# Patient Record
Sex: FEMALE | Race: WHITE | NOT HISPANIC OR LATINO | Employment: OTHER | URBAN - METROPOLITAN AREA
[De-identification: names, ages, dates, MRNs, and addresses within clinical notes are randomized per-mention and may not be internally consistent; named-entity substitution may affect disease eponyms.]

---

## 2017-01-23 DIAGNOSIS — L50.9 HIVES: Primary | ICD-10-CM

## 2017-01-23 RX ORDER — CETIRIZINE HYDROCHLORIDE 10 MG/1
10 TABLET ORAL
Qty: 30 TABLET | Refills: 1 | Status: CANCELLED | OUTPATIENT
Start: 2017-01-23

## 2017-01-23 NOTE — TELEPHONE ENCOUNTER
Cetirizine      Last Written Prescription Date: 12/27/2016  Last Fill Quantity: 30,  # refills: 1   Last Office Visit with G, [facility] or [facility] prescribing provider: 12/27/2016    Lj VICENTE)

## 2017-01-24 NOTE — TELEPHONE ENCOUNTER
Need to call patient and see if she is still getting rash, if she is, needs to see Dermatology per 12-27-16 office visit notes. Celena Ortega RN

## 2017-01-24 NOTE — TELEPHONE ENCOUNTER
Pt states she is not needing a Dermatology appt.  Got rid of soaps and lotions and rash is better.  Uses Ceterizine sparingly.  No refill needed.  KPavelRN

## 2017-04-24 ENCOUNTER — OFFICE VISIT (OUTPATIENT)
Dept: FAMILY MEDICINE | Facility: CLINIC | Age: 52
End: 2017-04-24
Payer: COMMERCIAL

## 2017-04-24 VITALS
OXYGEN SATURATION: 100 % | HEART RATE: 67 BPM | BODY MASS INDEX: 25.58 KG/M2 | SYSTOLIC BLOOD PRESSURE: 130 MMHG | DIASTOLIC BLOOD PRESSURE: 78 MMHG | WEIGHT: 144.4 LBS

## 2017-04-24 DIAGNOSIS — Z12.31 ENCOUNTER FOR SCREENING MAMMOGRAM FOR BREAST CANCER: ICD-10-CM

## 2017-04-24 DIAGNOSIS — Z00.00 ENCOUNTER FOR ROUTINE ADULT HEALTH EXAMINATION WITHOUT ABNORMAL FINDINGS: Primary | ICD-10-CM

## 2017-04-24 DIAGNOSIS — T75.3XXD TRAVEL SICKNESS, SUBSEQUENT ENCOUNTER: ICD-10-CM

## 2017-04-24 DIAGNOSIS — L94.0 CIRCUMSCRIBED SCLERODERMA: ICD-10-CM

## 2017-04-24 DIAGNOSIS — K21.9 GASTROESOPHAGEAL REFLUX DISEASE, ESOPHAGITIS PRESENCE NOT SPECIFIED: ICD-10-CM

## 2017-04-24 DIAGNOSIS — J30.89 OTHER ALLERGIC RHINITIS: ICD-10-CM

## 2017-04-24 DIAGNOSIS — J45.40 MODERATE PERSISTENT ASTHMA WITHOUT COMPLICATION: ICD-10-CM

## 2017-04-24 DIAGNOSIS — N95.2 POST-MENOPAUSAL ATROPHIC VAGINITIS: ICD-10-CM

## 2017-04-24 PROCEDURE — 99396 PREV VISIT EST AGE 40-64: CPT | Performed by: FAMILY MEDICINE

## 2017-04-24 RX ORDER — CLOBETASOL PROPIONATE 0.5 MG/G
CREAM TOPICAL
Qty: 45 G | Refills: 1 | Status: SHIPPED | OUTPATIENT
Start: 2017-04-24 | End: 2017-04-27

## 2017-04-24 RX ORDER — FLUTICASONE PROPIONATE 50 MCG
2 SPRAY, SUSPENSION (ML) NASAL DAILY
Qty: 48 G | Refills: 3 | Status: SHIPPED | OUTPATIENT
Start: 2017-04-24 | End: 2018-04-18

## 2017-04-24 RX ORDER — CLOBETASOL PROPIONATE 0.5 MG/G
CREAM TOPICAL
COMMUNITY
End: 2017-04-24

## 2017-04-24 RX ORDER — ESTRADIOL 0.5 MG/1
TABLET ORAL
Qty: 30 TABLET | Refills: 3 | Status: SHIPPED | OUTPATIENT
Start: 2017-04-24 | End: 2018-04-18

## 2017-04-24 RX ORDER — ESTRADIOL 0.5 MG/1
0.5 TABLET ORAL DAILY
Qty: 24 TABLET | Refills: 3 | Status: SHIPPED | OUTPATIENT
Start: 2017-04-24 | End: 2017-04-24

## 2017-04-24 RX ORDER — SCOLOPAMINE TRANSDERMAL SYSTEM 1 MG/1
1 PATCH, EXTENDED RELEASE TRANSDERMAL
Qty: 4 PATCH | Refills: 0 | Status: SHIPPED | OUTPATIENT
Start: 2017-04-24 | End: 2017-12-11

## 2017-04-24 NOTE — LETTER
My Asthma Action Plan  Name: Francesca Rivera   YOB: 1965  Date: 4/24/2017   My doctor: Kelsey Camejo MD   My clinic: [facility]        My Control Medicine:   My Rescue Medicine:    My Asthma Severity:   Avoid your asthma triggers:                GREEN ZONE     Good Control    I feel good    No cough or wheeze    Can work, sleep and play without asthma symptoms       Take your asthma control medicine every day.     1. If exercise triggers your asthma, take your rescue medication    15 minutes before exercise or sports, and    During exercise if you have asthma symptoms  2. Spacer to use with inhaler: If you have a spacer, make sure to use it with your inhaler             YELLOW ZONE     Getting Worse  I have ANY of these:    I do not feel good    Cough or wheeze    Chest feels tight    Wake up at night   1. Keep taking your Green Zone medications  2. Start taking your rescue medicine:    every 20 minutes for up to 1 hour. Then every 4 hours for 24-48 hours.  3. If you stay in the Yellow Zone for more than 12-24 hours, contact your doctor.  4. If you do not return to the Green Zone in 12-24 hours or you get worse, start taking your oral steroid medicine if prescribed by your provider.           RED ZONE     Medical Alert - Get Help  I have ANY of these:    I feel awful    Medicine is not helping    Breathing getting harder    Trouble walking or talking    Nose opens wide to breathe       1. Take your rescue medicine NOW  2. If your provider has prescribed an oral steroid medicine, start taking it NOW  3. Call your doctor NOW  4. If you are still in the Red Zone after 20 minutes and you have not reached your doctor:    Take your rescue medicine again and    Call 911 or go to the emergency room right away    See your regular doctor within 2 weeks of an Emergency Room or Urgent Care visit for follow-up treatment.        Electronically signed by: Stephani Gonzalez, April 24,  2017    Annual Reminders:  Meet with Asthma Educator,  Flu Shot in the Fall, consider Pneumonia Vaccination for patients with asthma (aged 19 and older).    Pharmacy:    [facility] - [facility] [address]  [facility] [place] - [address]  [facility] - [place], [place] - [address]                    Asthma Triggers  How To Control Things That Make Your Asthma Worse    Triggers are things that make your asthma worse.  Look at the list below to help you find your triggers and what you can do about them.  You can help prevent asthma flare-ups by staying away from your triggers.      Trigger                                                          What you can do   Cigarette Smoke  Tobacco smoke can make asthma worse. Do not allow smoking in your home, car or around you.  Be sure no one smokes at a child s day care or school.  If you smoke, ask your health care provider for ways to help you quit.  Ask family members to quit too.  Ask your health care provider for a referral to Quit Plan to help you quit smoking, or call 4-210-198PLAN.     Colds, Flu, Bronchitis  These are common triggers of asthma. Wash your hands often.  Don t touch your eyes, nose or mouth.  Get a flu shot every year.     Dust Mites  These are tiny bugs that live in cloth or carpet. They are too small to see. Wash sheets and blankets in hot water every week.   Encase pillows and mattress in dust mite proof covers.  Avoid having carpet if you can. If you have carpet, vacuum weekly.   Use a dust mask and HEPA vacuum.   Pollen and Outdoor Mold  Some people are allergic to trees, grass, or weed pollen, or molds. Try to keep your windows closed.  Limit time out doors when pollen count is high.   Ask you health care provider about taking medicine during allergy season.     Animal Dander  Some people are allergic to skin flakes, urine or saliva from pets  with fur or feathers. Keep pets with fur or feathers out of your home.    If you can t keep the pet outdoors, then keep the pet out of your bedroom.  Keep the bedroom door closed.  Keep pets off cloth furniture and away from stuffed toys.     Mice, Rats, and Cockroaches  Some people are allergic to the waste from these pests.   Cover food and garbage.  Clean up spills and food crumbs.  Store grease in the refrigerator.   Keep food out of the bedroom.   Indoor Mold  This can be a trigger if your home has high moisture. Fix leaking faucets, pipes, or other sources of water.   Clean moldy surfaces.  Dehumidify basement if it is damp and smelly.   Smoke, Strong Odors, and Sprays  These can reduce air quality. Stay away from strong odors and sprays, such as perfume, powder, hair spray, paints, smoke incense, paint, cleaning products, candles and new carpet.   Exercise or Sports  Some people with asthma have this trigger. Be active!  Ask your doctor about taking medicine before sports or exercise to prevent symptoms.    Warm up for 5-10 minutes before and after sports or exercise.     Other Triggers of Asthma  Cold air:  Cover your nose and mouth with a scarf.  Sometimes laughing or crying can be a trigger.  Some medicines and food can trigger asthma.

## 2017-04-24 NOTE — PROGRESS NOTES
SUBJECTIVE:     CC: Francesca Rivera is an 51 year old woman who presents for preventive health visit.     Chief Complaint   Patient presents with     Physical     refill medications -she would like the temovate to [facility] the rest mail order.     Medication Request     motion sickness medications for cruise next month.      Healthy Habits:    Do you get at least three servings of calcium containing foods daily (dairy, green leafy vegetables, etc.)? yes    Amount of exercise or daily activities, outside of work: 4 day(s) per week    Problems taking medications regularly No    Medication side effects: No    Have you had an eye exam in the past two years? yes    Do you see a dentist twice per year? yes  Do you have sleep apnea, excessive snoring or daytime drowsiness?no    Francesca Rivera is a 51 year old female here for an annual well woman exam.    Asthma is well controlled on Advair 250 and prn albuterol, which she rarely uses; current ACT score is 24.  She is planning a cruise in a month, and requests a prescription for TransDerm Scop, w nashh she has used in the past.  GERD is controlled on Prilosec, but she has taken this for a long time and is willing to try ranitidine to see if this would be as effective, since this would be preferable for longterm use if it works for her.  She has lost some weight, so that may have reduced symptoms as well.  She has been using Premarin vaginal cream but there are cost issues -- we will try switching her to oral estrogen tablets 0.5 mg placed intravaginally twice weekly.  Her lichen sclerosis et atrophicus is well controlled with occasional use of Temovate (clobetasol) -- she requests a refill, and denies any new areas of lesion or ulceration.    She will be scheduling a mammogram and is up to date on colon screening.    She denies any change or update in family history.    Today's PHQ-2 Score:   PHQ-2 ( 1999 Pfizer) 4/24/2017 4/11/2016   Q1: Little interest or  pleasure in doing things 0 0   Q2: Feeling down, depressed or hopeless 0 0   PHQ-2 Score 0 0   Little interest or pleasure in doing things - -   Feeling down, depressed or hopeless - -   PHQ-2 Score - -       Abuse: Current or Past(Physical, Sexual or Emotional)- No  Do you feel safe in your environment - Yes    Social History   Substance Use Topics     Smoking status: Former Smoker     Packs/day: 1.00     Years: 28.00     Types: Cigarettes     Quit date: 5/20/2010     Smokeless tobacco: Never Used     Alcohol use Yes      Comment: occ     The patient does not drink >3 drinks per day nor >7 drinks per week.    Recent Labs   Lab Test  04/11/16   1612  03/20/12   0924   CHOL  195  248*   HDL  73  72   LDL  114*  163*   TRIG   --   67   CHOLHDLRATIO   --   3.0       Reviewed orders with patient.  Reviewed health maintenance and updated orders accordingly - Yes    Her last Pap was in 2015, NIL with negative HPV    Reviewed and updated as needed this visit by clinical staff  Tobacco  Allergies  Meds         Reviewed and updated as needed this visit by Provider            ROS:  C: NEGATIVE for fever, chills, change in weight  I: NEGATIVE for worrisome rashes, moles or lesions  E: NEGATIVE for vision changes or irritation  ENT: NEGATIVE for ear, mouth and throat problems  R: NEGATIVE for significant cough or SOB  B: NEGATIVE for masses, tenderness or discharge  CV: NEGATIVE for chest pain, palpitations or peripheral edema  GI: NEGATIVE for nausea, abdominal pain, heartburn, or change in bowel habits  : NEGATIVE for unusual urinary or vaginal symptoms. No vaginal bleeding.  M: NEGATIVE for significant arthralgias or myalgia  N: NEGATIVE for weakness, dizziness or paresthesias  P: NEGATIVE for changes in mood or affect       OBJECTIVE:     /78 (BP Location: Right arm, Patient Position: Chair, Cuff Size: Adult Regular)  Pulse 67  Wt 144 lb 6.4 oz (65.5 kg)  LMP 08/15/2006  SpO2 100%  BMI 25.58  "kg/m2  EXAM:  GENERAL: healthy, alert and no distress  NECK: no adenopathy, no asymmetry, masses, or scars and thyroid normal to palpation  RESP: lungs clear to auscultation - no rales, rhonchi or wheezes  CV: regular rate and rhythm, normal S1 S2, no S3 or S4, no murmur, click or rub, no peripheral edema and peripheral pulses strong  ABDOMEN: soft, nontender, no hepatosplenomegaly, no masses and bowel sounds normal  MS: no gross musculoskeletal defects noted, no edema    ASSESSMENT/PLAN:         ICD-10-CM    1. Encounter for routine adult health examination without abnormal findings Z00.00    2. Travel sickness, subsequent encounter T75.3XXD scopolamine (TRANSDERM) 72 hr patch   3. Lichen sclerosis et atrophicus L94.0 DISCONTINUED: clobetasol (TEMOVATE) 0.05 % cream   4. Post-menopausal atrophic vaginitis N95.2 estradiol (ESTRACE) 0.5 MG tablet     DISCONTINUED: estradiol (ESTRACE) 0.5 MG tablet   5. Encounter for screening mammogram for breast cancer Z12.31 *MA Screening Digital Bilateral   6. Moderate persistent asthma without complication J45.40 fluticasone-salmeterol (ADVAIR DISKUS) 250-50 MCG/DOSE diskus inhaler   7. Other allergic rhinitis J30.89 fluticasone (FLONASE) 50 MCG/ACT spray   8. Gastroesophageal reflux disease, esophagitis presence not specified K21.9 ranitidine (ZANTAC) 150 MG tablet       COUNSELING:   Reviewed preventive health counseling, as reflected in patient instructions         reports that she quit smoking about 6 years ago. Her smoking use included Cigarettes. She has a 28.00 pack-year smoking history. She has never used smokeless tobacco.    Estimated body mass index is 25.58 kg/(m^2) as calculated from the following:    Height as of 5/20/16: 5' 3\" (1.6 m).    Weight as of this encounter: 144 lb 6.4 oz (65.5 kg).       Counseling Resources:  ATP IV Guidelines  Pooled Cohorts Equation Calculator  Breast Cancer Risk Calculator  FRAX Risk Assessment  ICSI Preventive Guidelines  Dietary " Guidelines for Americans, 2010  USDA's MyPlate  ASA Prophylaxis  Lung CA Screening    Kelsey Camejo MD  [facility]

## 2017-04-24 NOTE — MR AVS SNAPSHOT
After Visit Summary   4/24/2017    Francesca Rivera    MRN: [id number]           Patient Information     Date Of Birth          1965        Visit Information        Provider Department      4/24/2017 11:00 AM Kelsey Camejo MD [facility]        Today's Diagnoses     Travel sickness, subsequent encounter    -  1    Lichen sclerosis et atrophicus        Post-menopausal atrophic vaginitis        Encounter for screening mammogram for breast cancer        Moderate persistent asthma without complication        Other allergic rhinitis        Gastroesophageal reflux disease, esophagitis presence not specified          Care Instructions      Preventive Health Recommendations  Female Ages 50 - 64    Yearly exam: See your health care provider every year in order to  o Review health changes.   o Discuss preventive care.    o Review your medicines if your doctor has prescribed any.      Get a Pap test every three years (unless you have an abnormal result and your provider advises testing more often).    If you get Pap tests with HPV test, you only need to test every 5 years, unless you have an abnormal result.     You do not need a Pap test if your uterus was removed (hysterectomy) and you have not had cancer.    You should be tested each year for STDs (sexually transmitted diseases) if you're at risk.     Have a mammogram every 1 to 2 years.    Have a colonoscopy at age 50, or have a yearly FIT test (stool test). These exams screen for colon cancer.      Have a cholesterol test every 5 years, or more often if advised.    Have a diabetes test (fasting glucose) every three years. If you are at risk for diabetes, you should have this test more often.     If you are at risk for osteoporosis (brittle bone disease), think about having a bone density scan (DEXA).    Shots: Get a flu shot each year. Get a tetanus shot every 10 years.    Nutrition:     Eat at least 5 servings of fruits and  vegetables each day.    Eat whole-grain bread, whole-wheat pasta and brown rice instead of white grains and rice.    Talk to your provider about Calcium and Vitamin D.     Lifestyle    Exercise at least 150 minutes a week (30 minutes a day, 5 days a week). This will help you control your weight and prevent disease.    Limit alcohol to one drink per day.    No smoking.     Wear sunscreen to prevent skin cancer.     See your dentist every six months for an exam and cleaning.    See your eye doctor every 1 to 2 years.          Follow-ups after your visit        Future tests that were ordered for you today     Open Future Orders        Priority Expected Expires Ordered    *MA Screening Digital Bilateral Routine  4/24/2018 4/24/2017            Who to contact     If you have questions or need follow up information about today's clinic visit or your schedule please contact [facility] directly at [phone].  Normal or non-critical lab and imaging results will be communicated to you by Paltalkhart, letter or phone within 4 business days after the clinic has received the results. If you do not hear from us within 7 days, please contact the clinic through ClusterSevent or phone. If you have a critical or abnormal lab result, we will notify you by phone as soon as possible.  Submit refill requests through nuevoStage or call your pharmacy and they will forward the refill request to us. Please allow 3 business days for your refill to be completed.          Additional Information About Your Visit        MyChart Information     nuevoStage gives you secure access to your electronic health record. If you see a primary care provider, you can also send messages to your care team and make appointments. If you have questions, please call your primary care clinic.  If you do not have a primary care provider, please call [phone] and they will assist you.        Care EveryWhere ID     This is your Care EveryWhere ID. This could  be used by other organizations to access your [place] medical records  [id number]        Your Vitals Were     Pulse Last Period Pulse Oximetry BMI (Body Mass Index)          67 08/15/2006 100% 25.58 kg/m2         Blood Pressure from Last 3 Encounters:   04/24/17 130/78   12/27/16 124/82   05/20/16 128/80    Weight from Last 3 Encounters:   04/24/17 144 lb 6.4 oz (65.5 kg)   12/27/16 154 lb (69.9 kg)   05/20/16 142 lb (64.4 kg)                 Today's Medication Changes          These changes are accurate as of: 4/24/17 12:23 PM.  If you have any questions, ask your nurse or doctor.               Start taking these medicines.        Dose/Directions    estradiol 0.5 MG tablet   Commonly known as:  ESTRACE   Used for:  Post-menopausal atrophic vaginitis   Started by:  Kelsey Camejo MD        Insert one tablet in vagina daily for a week, then twice weekly.   Quantity:  30 tablet   Refills:  3       ranitidine 150 MG tablet   Commonly known as:  ZANTAC   Used for:  Gastroesophageal reflux disease, esophagitis presence not specified   Replaces:  omeprazole 20 MG CR capsule   Started by:  Kelsey Camejo MD        Dose:  150 mg   Take 1 tablet (150 mg) by mouth 2 times daily   Quantity:  180 tablet   Refills:  3       scopolamine 72 hr patch   Commonly known as:  TRANSDERM   Used for:  Travel sickness, subsequent encounter   Started by:  Kelsey Camejo MD        Dose:  1 patch   Place 1 patch onto the skin every 72 hours   Quantity:  4 patch   Refills:  0         Stop taking these medicines if you haven't already. Please contact your care team if you have questions.     omeprazole 20 MG CR capsule   Commonly known as:  priLOSEC   Replaced by:  ranitidine 150 MG tablet   Stopped by:  Kelsey Camejo MD                Where to get your medicines      These medications were sent to [facility] - [place] - [address]  [address], [place] [address]    Hours:  AKA  Sonia Meng Phone:  [phone]     clobetasol 0.05 % cream    estradiol 0.5 MG tablet    scopolamine 72 hr patch         These medications were sent to [facility] - [place], [place] - [address]  [address] [address]     Phone:  [phone]     fluticasone 50 MCG/ACT spray    fluticasone-salmeterol 250-50 MCG/DOSE diskus inhaler    ranitidine 150 MG tablet                Primary Care Provider Office Phone # Fax #    Kelsey Khloe Camejo [phone]       [facility] [address] [address] [address]        Thank you!     Thank you for choosing [facility]  for your care. Our goal is always to provide you with excellent care. Hearing back from our patients is one way we can continue to improve our services. Please take a few minutes to complete the written survey that you may receive in the mail after your visit with us. Thank you!             Your Updated Medication List - Protect others around you: Learn how to safely use, store and throw away your medicines at www.disposemymeds.org.          This list is accurate as of: 4/24/17 12:23 PM.  Always use your most recent med list.                   Brand Name Dispense Instructions for use    ADVIL 200 MG capsule   Generic drug:  ibuprofen      prn       albuterol 108 (90 BASE) MCG/ACT Inhaler    PROAIR HFA/PROVENTIL HFA/VENTOLIN HFA    1 Inhaler    Inhale 2 puffs into the lungs every 4 hours as needed for shortness of breath / dyspnea       CITRACAL + D 250-62.5 MG-UNIT Tabs   Generic drug:  CALCIUM CITRATE-VITAMIN D          clobetasol 0.05 % cream    TEMOVATE    45 g    Apply topically twice a week       estradiol 0.5 MG tablet    ESTRACE    30 tablet    Insert one tablet in vagina daily for a week, then twice weekly.       FLAXSEED OIL PO      1tab twice daily       fluticasone 50 MCG/ACT spray    FLONASE    48 g    Spray 2 sprays into both  nostrils daily       fluticasone-salmeterol 250-50 MCG/DOSE diskus inhaler    ADVAIR DISKUS    3 Inhaler    Inhale 1 puff into the lungs 2 times daily       ranitidine 150 MG tablet    ZANTAC    180 tablet    Take 1 tablet (150 mg) by mouth 2 times daily       scopolamine 72 hr patch    TRANSDERM    4 patch    Place 1 patch onto the skin every 72 hours

## 2017-04-24 NOTE — NURSING NOTE
"Chief Complaint   Patient presents with     Physical     refill medications -she would like the temovate to [facility] mail order.     Medication Request     motion sickness medications for cruise next month.        Initial /78 (BP Location: Right arm, Patient Position: Chair, Cuff Size: Adult Regular)  Pulse 67  Wt 144 lb 6.4 oz (65.5 kg)  LMP 08/15/2006  SpO2 100%  BMI 25.58 kg/m2 Estimated body mass index is 25.58 kg/(m^2) as calculated from the following:    Height as of 5/20/16: 5' 3\" (1.6 m).    Weight as of this encounter: 144 lb 6.4 oz (65.5 kg).  Medication Reconciliation: complete   Stephani Gonzalez CMA    "

## 2017-04-25 ASSESSMENT — ASTHMA QUESTIONNAIRES: ACT_TOTALSCORE: 24

## 2017-04-27 ENCOUNTER — MYC MEDICAL ADVICE (OUTPATIENT)
Dept: FAMILY MEDICINE | Facility: CLINIC | Age: 52
End: 2017-04-27

## 2017-04-27 DIAGNOSIS — L94.0 CIRCUMSCRIBED SCLERODERMA: ICD-10-CM

## 2017-04-27 RX ORDER — BETAMETHASONE DIPROPIONATE 0.5 MG/G
CREAM TOPICAL
Qty: 30 G | Refills: 3 | Status: SHIPPED | OUTPATIENT
Start: 2017-04-27 | End: 2019-10-04

## 2017-05-01 ENCOUNTER — RADIANT APPOINTMENT (OUTPATIENT)
Dept: MAMMOGRAPHY | Facility: CLINIC | Age: 52
End: 2017-05-01
Attending: FAMILY MEDICINE
Payer: COMMERCIAL

## 2017-05-01 DIAGNOSIS — Z12.31 ENCOUNTER FOR SCREENING MAMMOGRAM FOR BREAST CANCER: ICD-10-CM

## 2017-05-01 DIAGNOSIS — R30.0 DYSURIA: Primary | ICD-10-CM

## 2017-05-01 PROCEDURE — G0202 SCR MAMMO BI INCL CAD: HCPCS | Mod: TC

## 2017-05-17 DIAGNOSIS — J45.20 MILD INTERMITTENT ASTHMA: ICD-10-CM

## 2017-05-18 RX ORDER — ALBUTEROL SULFATE 90 UG/1
AEROSOL, METERED RESPIRATORY (INHALATION)
Qty: 18 INHALER | Refills: 2 | Status: SHIPPED | OUTPATIENT
Start: 2017-05-18 | End: 2018-04-18

## 2017-05-18 NOTE — TELEPHONE ENCOUNTER
Ventolin       Last Written Prescription Date: 06/15/15  Last Fill Quantity: 1, # refills: 3    Last Office Visit with G, P or [facility] prescribing provider:  04/24/17   Future Office Visit:       Date of Last Asthma Action Plan Letter:   Asthma Action Plan Q1 Year    Topic Date Due     Asthma Action Plan - yearly  04/24/2018      Asthma Control Test:   ACT Total Scores 4/24/2017   ACT TOTAL SCORE (Goal Greater than or Equal to 20) 24   In the past 12 months, how many times did you visit the emergency room for your asthma without being admitted to the hospital? 0   In the past 12 months, how many times were you hospitalized overnight because of your asthma? 0       Date of Last Spirometry Test:   No results found for this or any previous visit.

## 2017-06-20 ENCOUNTER — MYC MEDICAL ADVICE (OUTPATIENT)
Dept: FAMILY MEDICINE | Facility: CLINIC | Age: 52
End: 2017-06-20

## 2017-06-20 DIAGNOSIS — K21.9 GASTROESOPHAGEAL REFLUX DISEASE, ESOPHAGITIS PRESENCE NOT SPECIFIED: Primary | ICD-10-CM

## 2017-06-20 NOTE — TELEPHONE ENCOUNTER
Pt requesting to go back on Omeprazole 20 mg/day.  Ranitidine x 2 months and is not working.  Order pended.  Advise.KPavelRN

## 2017-07-24 ENCOUNTER — E-VISIT (OUTPATIENT)
Dept: FAMILY MEDICINE | Facility: CLINIC | Age: 52
End: 2017-07-24
Payer: COMMERCIAL

## 2017-07-24 DIAGNOSIS — R31.9 HEMATURIA: Primary | ICD-10-CM

## 2017-07-24 DIAGNOSIS — R39.9 URINARY SYMPTOM OR SIGN: ICD-10-CM

## 2017-07-24 PROCEDURE — 99444 ZZC PHYSICIAN ONLINE EVALUATION & MANAGEMENT SERVICE: CPT | Performed by: FAMILY MEDICINE

## 2017-07-25 ENCOUNTER — MYC MEDICAL ADVICE (OUTPATIENT)
Dept: FAMILY MEDICINE | Facility: CLINIC | Age: 52
End: 2017-07-25

## 2017-07-25 DIAGNOSIS — R31.9 HEMATURIA: ICD-10-CM

## 2017-07-25 DIAGNOSIS — R39.9 URINARY SYMPTOM OR SIGN: ICD-10-CM

## 2017-07-25 DIAGNOSIS — N30.01 ACUTE CYSTITIS WITH HEMATURIA: Primary | ICD-10-CM

## 2017-07-25 LAB
ALBUMIN UR-MCNC: 30 MG/DL
APPEARANCE UR: ABNORMAL
BACTERIA #/AREA URNS HPF: ABNORMAL /HPF
BILIRUB UR QL STRIP: NEGATIVE
COLOR UR AUTO: YELLOW
GLUCOSE UR STRIP-MCNC: NEGATIVE MG/DL
HGB UR QL STRIP: ABNORMAL
KETONES UR STRIP-MCNC: NEGATIVE MG/DL
LEUKOCYTE ESTERASE UR QL STRIP: ABNORMAL
MUCOUS THREADS #/AREA URNS LPF: PRESENT /LPF
NITRATE UR QL: POSITIVE
PH UR STRIP: 5.5 PH (ref 5–7)
RBC #/AREA URNS AUTO: ABNORMAL /HPF (ref 0–2)
SP GR UR STRIP: 1.02 (ref 1–1.03)
URN SPEC COLLECT METH UR: ABNORMAL
UROBILINOGEN UR STRIP-ACNC: 0.2 EU/DL (ref 0.2–1)
WBC #/AREA URNS AUTO: ABNORMAL /HPF (ref 0–2)

## 2017-07-25 PROCEDURE — 81001 URINALYSIS AUTO W/SCOPE: CPT | Performed by: FAMILY MEDICINE

## 2017-07-25 PROCEDURE — 87086 URINE CULTURE/COLONY COUNT: CPT | Performed by: FAMILY MEDICINE

## 2017-07-25 PROCEDURE — 87186 SC STD MICRODIL/AGAR DIL: CPT | Performed by: FAMILY MEDICINE

## 2017-07-25 PROCEDURE — 87088 URINE BACTERIA CULTURE: CPT | Performed by: FAMILY MEDICINE

## 2017-07-26 RX ORDER — SULFAMETHOXAZOLE/TRIMETHOPRIM 800-160 MG
1 TABLET ORAL 2 TIMES DAILY
Qty: 6 TABLET | Refills: 0 | Status: SHIPPED | OUTPATIENT
Start: 2017-07-26 | End: 2017-07-29

## 2017-07-27 LAB
BACTERIA SPEC CULT: ABNORMAL
MICRO REPORT STATUS: ABNORMAL
MICROORGANISM SPEC CULT: ABNORMAL
SPECIMEN SOURCE: ABNORMAL

## 2017-12-03 DIAGNOSIS — K21.9 GASTROESOPHAGEAL REFLUX DISEASE, ESOPHAGITIS PRESENCE NOT SPECIFIED: ICD-10-CM

## 2017-12-11 ENCOUNTER — MYC REFILL (OUTPATIENT)
Dept: FAMILY MEDICINE | Facility: CLINIC | Age: 52
End: 2017-12-11

## 2017-12-11 DIAGNOSIS — T75.3XXD TRAVEL SICKNESS, SUBSEQUENT ENCOUNTER: ICD-10-CM

## 2017-12-11 RX ORDER — SCOLOPAMINE TRANSDERMAL SYSTEM 1 MG/1
1 PATCH, EXTENDED RELEASE TRANSDERMAL
Qty: 2 PATCH | Refills: 0 | Status: SHIPPED | OUTPATIENT
Start: 2017-12-11 | End: 2018-04-18

## 2017-12-11 NOTE — TELEPHONE ENCOUNTER
Scopolamine patch for cruise.  Spoke with pt and she plans to have Karol Yousif as her new PCP.  Last seen 4/24/17 for GME with .  Appt not DUE until 4/2018.  Advise.  Sam

## 2017-12-11 NOTE — TELEPHONE ENCOUNTER
Message from Screwpulp:  Original authorizing provider: MD Francesca Hitchcock would like a refill of the following medications:  scopolamine (TRANSDERM) 72 hr patch [Kelsey Camejo MD]    Preferred pharmacy: [facility] - [place], [place] - [address].    Comment:  I have a cruise coming up and would like to get 2 more of these patches for sea sickness. The previous prescriptions was for a box of four, and I only need two this time. Thank you!

## 2018-02-14 ENCOUNTER — TELEPHONE (OUTPATIENT)
Dept: FAMILY MEDICINE | Facility: CLINIC | Age: 53
End: 2018-02-14

## 2018-02-14 NOTE — TELEPHONE ENCOUNTER
Patient reports symptoms started last night and have not been present today.  Pain with urination, frequency and urgency, Blood in urine  Denies Back/flank pain, abdominal pain, fever, or other symptoms at this time.  Patient was requesting UA/UC orders.  RN advised appt - transferred to scheduling.  Lo STEELE RN

## 2018-02-14 NOTE — TELEPHONE ENCOUNTER
Reason for call:  Patient reporting a symptom    Symptom or request: UTI    Duration (how long have symptoms been present): 2 days    Have you been treated for this before? No    Additional comments: She thinks she may have a UTI.  She is having pain with urination, urgency and her urine is discolored.  She would like to be seen.  There are no openings.  Please advise.    Phone Number patient can be reached at:  Home number on file [phone] (home)    Best Time:  any    Can we leave a detailed message on this number:  YES    Call taken on 2/14/2018 at 12:04 PM by Jamia Zabala

## 2018-02-15 ENCOUNTER — OFFICE VISIT (OUTPATIENT)
Dept: FAMILY MEDICINE | Facility: CLINIC | Age: 53
End: 2018-02-15
Payer: COMMERCIAL

## 2018-02-15 VITALS
HEIGHT: 63 IN | BODY MASS INDEX: 25.98 KG/M2 | TEMPERATURE: 98.5 F | DIASTOLIC BLOOD PRESSURE: 74 MMHG | HEART RATE: 89 BPM | RESPIRATION RATE: 16 BRPM | SYSTOLIC BLOOD PRESSURE: 102 MMHG | WEIGHT: 146.6 LBS

## 2018-02-15 DIAGNOSIS — R39.9 SYMPTOMS INVOLVING URINARY SYSTEM: Primary | ICD-10-CM

## 2018-02-15 DIAGNOSIS — R82.90 NONSPECIFIC FINDING ON EXAMINATION OF URINE: ICD-10-CM

## 2018-02-15 LAB
ALBUMIN UR-MCNC: 100 MG/DL
APPEARANCE UR: ABNORMAL
BACTERIA #/AREA URNS HPF: ABNORMAL /HPF
BILIRUB UR QL STRIP: NEGATIVE
COLOR UR AUTO: YELLOW
GLUCOSE UR STRIP-MCNC: NEGATIVE MG/DL
HGB UR QL STRIP: ABNORMAL
KETONES UR STRIP-MCNC: NEGATIVE MG/DL
LEUKOCYTE ESTERASE UR QL STRIP: ABNORMAL
MUCOUS THREADS #/AREA URNS LPF: PRESENT /LPF
NITRATE UR QL: POSITIVE
NON-SQ EPI CELLS #/AREA URNS LPF: ABNORMAL /LPF
PH UR STRIP: 5.5 PH (ref 5–7)
RBC #/AREA URNS AUTO: ABNORMAL /HPF
SOURCE: ABNORMAL
SP GR UR STRIP: >1.03 (ref 1–1.03)
UROBILINOGEN UR STRIP-ACNC: 0.2 EU/DL (ref 0.2–1)
WBC #/AREA URNS AUTO: ABNORMAL /HPF

## 2018-02-15 PROCEDURE — 81001 URINALYSIS AUTO W/SCOPE: CPT | Performed by: NURSE PRACTITIONER

## 2018-02-15 PROCEDURE — 87088 URINE BACTERIA CULTURE: CPT | Performed by: NURSE PRACTITIONER

## 2018-02-15 PROCEDURE — 99213 OFFICE O/P EST LOW 20 MIN: CPT | Performed by: NURSE PRACTITIONER

## 2018-02-15 PROCEDURE — 87086 URINE CULTURE/COLONY COUNT: CPT | Performed by: NURSE PRACTITIONER

## 2018-02-15 PROCEDURE — 87186 SC STD MICRODIL/AGAR DIL: CPT | Performed by: NURSE PRACTITIONER

## 2018-02-15 RX ORDER — SULFAMETHOXAZOLE/TRIMETHOPRIM 800-160 MG
1 TABLET ORAL 2 TIMES DAILY
Qty: 6 TABLET | Refills: 0 | Status: SHIPPED | OUTPATIENT
Start: 2018-02-15 | End: 2018-02-18

## 2018-02-15 NOTE — PROGRESS NOTES
SUBJECTIVE:   Francesca Rivera is a 52 year old female who presents to clinic today for the following health issues:      URINARY TRACT SYMPTOMS  Onset: 3 days    Description:   Painful urination (Dysuria): YES  Blood in urine (Hematuria): YES- Tuesday night  Delay in urine (Hesitency): YES    Intensity: mild, was severe on Tuesday    Progression of Symptoms:  improving    Accompanying Signs & Symptoms:  Fever/chills: no   Flank pain no   Nausea and vomiting: no   Any vaginal symptoms: none  Abdominal/Pelvic Pain: no     History:   History of frequent UTI's: no, did have one this past summer  History of kidney stones: no   Sexually Active: Yes   Possibility of pregnancy: No    Precipitating factors:   None known    Therapies Tried and outcome: Increase fluid intake and OTC advil or tylenol     -------------------------------------    Problem list and histories reviewed & adjusted, as indicated.  Additional history: as documented    Patient Active Problem List   Diagnosis     Mild intermittent asthma     Allergic rhinitis     Esophageal reflux     Asymptomatic postmenopausal status     Post-menopausal atrophic vaginitis     CARDIOVASCULAR SCREENING; LDL GOAL LESS THAN 160     Lichen sclerosis et atrophicus     Seasonal Moderate persistent asthma     Past Surgical History:   Procedure Laterality Date     COLONOSCOPY N/A 5/20/2016    Procedure: COLONOSCOPY;  Surgeon: Darell Mayes MD;  Location: [facility]     TUBAL LIGATION         Social History   Substance Use Topics     Smoking status: Former Smoker     Packs/day: 1.00     Years: 28.00     Types: Cigarettes     Quit date: 5/20/2010     Smokeless tobacco: Never Used     Alcohol use Yes      Comment: occ     Family History   Problem Relation Age of Onset     Alcohol/Drug Father      DIABETES Father      Hypertension Father      Psychotic Disorder Father      bipolar     Asthma Father      Cancer - colorectal Father 60     Depression Father      CANCER  "Maternal Grandfather      lung     Arthritis Maternal Grandfather      CANCER Paternal Grandmother      stomach     Psychotic Disorder Paternal Grandmother      bipolar     Depression Paternal Grandmother      Arthritis Paternal Grandmother      Breast Cancer Maternal Grandmother      Cancer - colorectal Maternal Grandmother      Arthritis Maternal Grandmother      Depression Daughter      Lupus Mother      C.A.D. Paternal Grandfather      CEREBROVASCULAR DISEASE Paternal Grandfather      Arthritis Paternal Grandfather      Depression Son            Reviewed and updated as needed this visit by clinical staff       Reviewed and updated as needed this visit by Provider         ROS:  Constitutional, HEENT, cardiovascular, pulmonary, GI, , musculoskeletal, neuro, skin, endocrine and psych systems are negative, except as otherwise noted.    OBJECTIVE:     /74 (BP Location: Left arm, Patient Position: Chair, Cuff Size: Adult Regular)  Pulse 89  Temp 98.5  F (36.9  C) (Tympanic)  Resp 16  Ht 5' 3.25\" (1.607 m)  Wt 146 lb 9.6 oz (66.5 kg)  LMP 08/15/2006  BMI 25.76 kg/m2  Body mass index is 25.76 kg/(m^2).  GENERAL: healthy, alert and no distress  RESP: lungs clear to auscultation - no rales, rhonchi or wheezes  CV: regular rate and rhythm, normal S1 S2, no S3 or S4, no murmur, click or rub, no peripheral edema and peripheral pulses strong  ABDOMEN: soft, nontender, no hepatosplenomegaly, no masses and bowel sounds normal  MS: no gross musculoskeletal defects noted, no edema    Diagnostic Test Results:  Results for orders placed or performed in visit on 02/15/18 (from the past 24 hour(s))   *UA reflex to Microscopic and Culture ([place] and [facility] (except [place] and [place])   Result Value Ref Range    Color Urine Yellow     Appearance Urine Cloudy     Glucose Urine Negative NEG^Negative mg/dL    Bilirubin Urine Negative NEG^Negative    Ketones Urine Negative NEG^Negative mg/dL    Specific " "Gravity Urine >1.030 1.003 - 1.035    Blood Urine Moderate (A) NEG^Negative    pH Urine 5.5 5.0 - 7.0 pH    Protein Albumin Urine 100 (A) NEG^Negative mg/dL    Urobilinogen Urine 0.2 0.2 - 1.0 EU/dL    Nitrite Urine Positive (A) NEG^Negative    Leukocyte Esterase Urine Small (A) NEG^Negative    Source Midstream Urine    Urine Microscopic   Result Value Ref Range    WBC Urine 10-25 (A) OTO2^O - 2 /HPF    RBC Urine 2-5 (A) OTO2^O - 2 /HPF    Squamous Epithelial /LPF Urine Few FEW^Few /LPF    Bacteria Urine Few (A) NEG^Negative /HPF    Mucous Urine Present (A) NEG^Negative /LPF       ASSESSMENT/PLAN:       1. Symptoms involving urinary system    - *UA reflex to Microscopic and Culture ([place] and [facility] (except [place] and [place])  - Urine Microscopic  - sulfamethoxazole-trimethoprim (BACTRIM DS/SEPTRA DS) 800-160 MG per tablet; Take 1 tablet by mouth 2 times daily for 3 days  Dispense: 6 tablet; Refill: 0    2. Nonspecific finding on examination of urine    - Urine Culture Aerobic Bacterial      Patient Instructions         Thank you for choosing [facility].  You may be receiving a survey in the mail from [employer] regarding your visit today.  Please take a few minutes to complete and return the survey to let us know how we are doing.      If you have questions or concerns, please contact us via [employer] or you can contact your care team at [phone].    Our Clinic hours are:  Monday 6:40 am  to 7:00 pm  Tuesday -Friday 6:40 am to 5:00 pm    The [place] outpatient lab hours are:  Monday - Friday 6:10 am to 4:45 pm  Saturdays 7:00 am to 11:00 am  Appointments are required, call [phone]    If you have clinical questions after hours or would like to schedule an appointment,  call the clinic at [phone].       * BLADDER INFECTION,Female (Adult)    A bladder infection (\"cystitis\" or \"UTI\") usually causes a constant urge to urinate and a burning when passing urine. Urine may be cloudy, " smelly or dark. There may be pain in the lower abdomen. A bladder infection occurs when bacteria from the vaginal area enter the bladder opening (urethra). This can occur from sexual intercourse, wearing tight clothing, dehydration and other factors.  HOME CARE:  1. Drink lots of fluids (at least 6-8 glasses a day, unless you must restrict fluids for other medical reasons). This will force the medicine into your urinary system and flush the bacteria out of your body. Cranberry juice has been shown to help clear out the bacteria.  2. Avoid sexual intercourse until your symptoms are gone.  3. A bladder infection is treated with antibiotics. You may also be given Pyridium (generic = phenazopyridine) to reduce the burning sensation. This medicine will cause your urine to become a bright orange color. The orange urine may stain clothing. You may wear a pad or panty-liner to protect clothing.  PREVENTING FUTURE INFECTIONS:  1. Always wipe from front to back after a bowel movement.  2. Keep the genital area clean and dry.  3. Drink plenty of fluids each day to avoid dehydration.  4. Urinate right after intercourse to flush out the bladder.  5. Wear cotton underwear and cotton-lined panty hose; avoid tight-fitting pants.  6. If you are on birth control pills and are having frequent bladder infections, discuss with your doctor.  FOLLOW UP: Return to this facility or see your doctor if ALL symptoms are not gone after three days of treatment.  GET PROMPT MEDICAL ATTENTION if any of the following occur:    Fever over 101 F (38.3 C)    No improvement by the third day of treatment    Increasing back or abdominal pain    Repeated vomiting; unable to keep medicine down    Weakness, dizziness or fainting    Vaginal discharge    Pain, redness or swelling in the labia (outer vaginal area)    9629-9243 [employer]. [address] [place] [address], [place] [address]. All rights reserved. This information is not intended as a  substitute for professional medical care. Always follow your healthcare professional's instructions.  This information has been modified by your health care provider with permission from the publisher.        JAZIEL Madera [facility]

## 2018-02-15 NOTE — PATIENT INSTRUCTIONS
"      Thank you for choosing [facility].  You may be receiving a survey in the mail from Trace Martinez regarding your visit today.  Please take a few minutes to complete and return the survey to let us know how we are doing.      If you have questions or concerns, please contact us via [employer] or you can contact your care team at [phone].    Our Clinic hours are:  Monday 6:40 am  to 7:00 pm  Tuesday -Friday 6:40 am to 5:00 pm    The [place] outpatient lab hours are:  Monday - Friday 6:10 am to 4:45 pm  Saturdays 7:00 am to 11:00 am  Appointments are required, call [phone]    If you have clinical questions after hours or would like to schedule an appointment,  call the clinic at [phone].       * BLADDER INFECTION,Female (Adult)    A bladder infection (\"cystitis\" or \"UTI\") usually causes a constant urge to urinate and a burning when passing urine. Urine may be cloudy, smelly or dark. There may be pain in the lower abdomen. A bladder infection occurs when bacteria from the vaginal area enter the bladder opening (urethra). This can occur from sexual intercourse, wearing tight clothing, dehydration and other factors.  HOME CARE:  1. Drink lots of fluids (at least 6-8 glasses a day, unless you must restrict fluids for other medical reasons). This will force the medicine into your urinary system and flush the bacteria out of your body. Cranberry juice has been shown to help clear out the bacteria.  2. Avoid sexual intercourse until your symptoms are gone.  3. A bladder infection is treated with antibiotics. You may also be given Pyridium (generic = phenazopyridine) to reduce the burning sensation. This medicine will cause your urine to become a bright orange color. The orange urine may stain clothing. You may wear a pad or panty-liner to protect clothing.  PREVENTING FUTURE INFECTIONS:  1. Always wipe from front to back after a bowel movement.  2. Keep the genital area clean and dry.  3. Drink plenty of " fluids each day to avoid dehydration.  4. Urinate right after intercourse to flush out the bladder.  5. Wear cotton underwear and cotton-lined panty hose; avoid tight-fitting pants.  6. If you are on birth control pills and are having frequent bladder infections, discuss with your doctor.  FOLLOW UP: Return to this facility or see your doctor if ALL symptoms are not gone after three days of treatment.  GET PROMPT MEDICAL ATTENTION if any of the following occur:    Fever over 101 F (38.3 C)    No improvement by the third day of treatment    Increasing back or abdominal pain    Repeated vomiting; unable to keep medicine down    Weakness, dizziness or fainting    Vaginal discharge    Pain, redness or swelling in the labia (outer vaginal area)    6708-6946 [employer]. [address] [place] [address], [place] [address]. All rights reserved. This information is not intended as a substitute for professional medical care. Always follow your healthcare professional's instructions.  This information has been modified by your health care provider with permission from the publisher.

## 2018-02-15 NOTE — MR AVS SNAPSHOT
"              After Visit Summary   2/15/2018    Francesca Rivera    MRN: [id number]           Patient Information     Date Of Birth          1965        Visit Information        Provider Department      2/15/2018 6:40 AM Noemi Banks APRN CNP [facility]        Today's Diagnoses     Symptoms involving urinary system    -  1    Nonspecific finding on examination of urine          Care Instructions          Thank you for choosing [facility]  You may be receiving a survey in the mail from "[employer]" regarding your visit today.  Please take a few minutes to complete and return the survey to let us know how we are doing.      If you have questions or concerns, please contact us via [employer] or you can contact your care team at [phone].    Our Clinic hours are:  Monday 6:40 am  to 7:00 pm  Tuesday -Friday 6:40 am to 5:00 pm    The [place] outpatient lab hours are:  Monday - Friday 6:10 am to 4:45 pm  Saturdays 7:00 am to 11:00 am  Appointments are required, call [phone]    If you have clinical questions after hours or would like to schedule an appointment,  call the clinic at [phone].       * BLADDER INFECTION,Female (Adult)    A bladder infection (\"cystitis\" or \"UTI\") usually causes a constant urge to urinate and a burning when passing urine. Urine may be cloudy, smelly or dark. There may be pain in the lower abdomen. A bladder infection occurs when bacteria from the vaginal area enter the bladder opening (urethra). This can occur from sexual intercourse, wearing tight clothing, dehydration and other factors.  HOME CARE:  1. Drink lots of fluids (at least 6-8 glasses a day, unless you must restrict fluids for other medical reasons). This will force the medicine into your urinary system and flush the bacteria out of your body. Cranberry juice has been shown to help clear out the bacteria.  2. Avoid sexual intercourse until your symptoms are gone.  3. A bladder infection is " treated with antibiotics. You may also be given Pyridium (generic = phenazopyridine) to reduce the burning sensation. This medicine will cause your urine to become a bright orange color. The orange urine may stain clothing. You may wear a pad or panty-liner to protect clothing.  PREVENTING FUTURE INFECTIONS:  1. Always wipe from front to back after a bowel movement.  2. Keep the genital area clean and dry.  3. Drink plenty of fluids each day to avoid dehydration.  4. Urinate right after intercourse to flush out the bladder.  5. Wear cotton underwear and cotton-lined panty hose; avoid tight-fitting pants.  6. If you are on birth control pills and are having frequent bladder infections, discuss with your doctor.  FOLLOW UP: Return to this facility or see your doctor if ALL symptoms are not gone after three days of treatment.  GET PROMPT MEDICAL ATTENTION if any of the following occur:    Fever over 101 F (38.3 C)    No improvement by the third day of treatment    Increasing back or abdominal pain    Repeated vomiting; unable to keep medicine down    Weakness, dizziness or fainting    Vaginal discharge    Pain, redness or swelling in the labia (outer vaginal area)    9318-5926 [employer]. [address] [place] [address]. All rights reserved. This information is not intended as a substitute for professional medical care. Always follow your healthcare professional's instructions.  This information has been modified by your health care provider with permission from the publisher.            Follow-ups after your visit        Who to contact     If you have questions or need follow up information about today's clinic visit or your schedule please contact [facility] directly at [phone].  Normal or non-critical lab and imaging results will be communicated to you by MyChart, letter or phone within 4 business days after the clinic has received the results. If you do not hear from us  "within 7 days, please contact the clinic through [employer] or phone. If you have a critical or abnormal lab result, we will notify you by phone as soon as possible.  Submit refill requests through [employer] or call your pharmacy and they will forward the refill request to us. Please allow 3 business days for your refill to be completed.          Additional Information About Your Visit        [employer] Information     [employer] gives you secure access to your electronic health record. If you see a primary care provider, you can also send messages to your care team and make appointments. If you have questions, please call your primary care clinic.  If you do not have a primary care provider, please call [phone] and they will assist you.        Care EveryWhere ID     This is your Care EveryWhere ID. This could be used by other organizations to access your [facility] medical records  [id number]        Your Vitals Were     Pulse Temperature Respirations Height Last Period BMI (Body Mass Index)    89 98.5  F (36.9  C) (Tympanic) 16 5' 3.25\" (1.607 m) 08/15/2006 25.76 kg/m2       Blood Pressure from Last 3 Encounters:   02/15/18 102/74   04/24/17 130/78   12/27/16 124/82    Weight from Last 3 Encounters:   02/15/18 146 lb 9.6 oz (66.5 kg)   04/24/17 144 lb 6.4 oz (65.5 kg)   12/27/16 154 lb (69.9 kg)              We Performed the Following     *UA reflex to Microscopic and Culture ([place] and [facility] (except [place] and [place])     Urine Culture Aerobic Bacterial     Urine Microscopic          Today's Medication Changes          These changes are accurate as of 2/15/18  7:14 AM.  If you have any questions, ask your nurse or doctor.               Start taking these medicines.        Dose/Directions    sulfamethoxazole-trimethoprim 800-160 MG per tablet   Commonly known as:  BACTRIM DS/SEPTRA DS   Used for:  Symptoms involving urinary system   Started by:  Noemi Banks APRN CNP        Dose:  1 tablet   Take 1 " tablet by mouth 2 times daily for 3 days   Quantity:  6 tablet   Refills:  0            Where to get your medicines      These medications were sent to [place] [facility] - [place], [place] - [address]  [address], [place] [address]    Hours:  [facility] [place] [facility] Phone:  [phone]     sulfamethoxazole-trimethoprim 800-160 MG per tablet                Primary Care Provider    Kelsey Camejo MD       No address on file        Equal Access to Services     [facility]: Hadii aad ku hadasho Soomaali, waaxda luqadaha, qaybta kaalmada adeegyada, waxay idiin hayaan adeeg kharash lasoumya . So [facility] [phone].    ATENCIÓN: Si habla español, tiene a cuellar disposición servicios gratuitos de asistencia lingüística. [facility] [phone].    We comply with applicable federal civil rights laws and [place] laws. We do not discriminate on the basis of race, color, national origin, age, disability, sex, sexual orientation, or gender identity.            Thank you!     Thank you for choosing [facility]  for your care. Our goal is always to provide you with excellent care. Hearing back from our patients is one way we can continue to improve our services. Please take a few minutes to complete the written survey that you may receive in the mail after your visit with us. Thank you!             Your Updated Medication List - Protect others around you: Learn how to safely use, store and throw away your medicines at www.disposemymeds.org.          This list is accurate as of 2/15/18  7:14 AM.  Always use your most recent med list.                   Brand Name Dispense Instructions for use Diagnosis    ADVIL 200 MG capsule   Generic drug:  ibuprofen      prn        augmented betamethasone dipropionate 0.05 % cream    DIPROLENE-AF    30 g    Apply topically twice a week    Circumscribed scleroderma       CITRACAL + D 250-62.5 MG-UNIT Tabs   Generic drug:  CALCIUM CITRATE-VITAMIN D            estradiol 0.5 MG tablet    ESTRACE    30 tablet    Insert one tablet in vagina daily for a week, then twice weekly.    Post-menopausal atrophic vaginitis       FLAXSEED OIL PO      1tab twice daily        fluticasone 50 MCG/ACT spray    FLONASE    48 g    Spray 2 sprays into both nostrils daily    Other allergic rhinitis       fluticasone-salmeterol 250-50 MCG/DOSE diskus inhaler    ADVAIR DISKUS    3 Inhaler    Inhale 1 puff into the lungs 2 times daily    Moderate persistent asthma without complication       omeprazole 20 MG CR capsule    priLOSEC    90 capsule    TAKE 1 CAPSULE BY MOUTH DAILY -GENERIC FOR PRILOSEC    Gastroesophageal reflux disease, esophagitis presence not specified       scopolamine 72 hr patch    TRANSDERM    2 patch    Place 1 patch onto the skin every 72 hours    Travel sickness, subsequent encounter       sulfamethoxazole-trimethoprim 800-160 MG per tablet    BACTRIM DS/SEPTRA DS    6 tablet    Take 1 tablet by mouth 2 times daily for 3 days    Symptoms involving urinary system       VENTOLIN  (90 BASE) MCG/ACT Inhaler   Generic drug:  albuterol     18 Inhaler    INHALE 2 PUFFS INTO THE LUNGS EVERY 4 HOURS AS NEEDED FOR SHORTNESS OF BREATH / DYSPNEA    Mild intermittent asthma

## 2018-02-15 NOTE — NURSING NOTE
"Chief Complaint   Patient presents with     UTI       Initial /74 (BP Location: Left arm, Patient Position: Chair, Cuff Size: Adult Regular)  Pulse 89  Temp 98.5  F (36.9  C) (Tympanic)  Resp 16  Ht 5' 3.25\" (1.607 m)  Wt 146 lb 9.6 oz (66.5 kg)  LMP 08/15/2006  BMI 25.76 kg/m2 Estimated body mass index is 25.76 kg/(m^2) as calculated from the following:    Height as of this encounter: 5' 3.25\" (1.607 m).    Weight as of this encounter: 146 lb 9.6 oz (66.5 kg).  Medication Reconciliation: complete  "

## 2018-02-16 ASSESSMENT — ASTHMA QUESTIONNAIRES: ACT_TOTALSCORE: 25

## 2018-02-17 LAB
BACTERIA SPEC CULT: ABNORMAL
Lab: ABNORMAL
SPECIMEN SOURCE: ABNORMAL

## 2018-02-19 ENCOUNTER — MYC MEDICAL ADVICE (OUTPATIENT)
Dept: FAMILY MEDICINE | Facility: CLINIC | Age: 53
End: 2018-02-19

## 2018-02-19 ENCOUNTER — TELEPHONE (OUTPATIENT)
Dept: FAMILY MEDICINE | Facility: CLINIC | Age: 53
End: 2018-02-19

## 2018-02-19 RX ORDER — NITROFURANTOIN 25; 75 MG/1; MG/1
100 CAPSULE ORAL 2 TIMES DAILY
Qty: 14 CAPSULE | Refills: 0 | Status: SHIPPED | OUTPATIENT
Start: 2018-02-19 | End: 2018-04-18

## 2018-02-19 NOTE — TELEPHONE ENCOUNTER
Patient reports:  She sent the Mychart note this morning before she had heard of Rx change from the clinic  She is aware of new RX sent to the pharmacy    Cheryl STEELE Rn

## 2018-04-18 ENCOUNTER — OFFICE VISIT (OUTPATIENT)
Dept: FAMILY MEDICINE | Facility: CLINIC | Age: 53
End: 2018-04-18
Payer: COMMERCIAL

## 2018-04-18 VITALS
HEART RATE: 79 BPM | TEMPERATURE: 97.8 F | WEIGHT: 149 LBS | DIASTOLIC BLOOD PRESSURE: 78 MMHG | BODY MASS INDEX: 26.4 KG/M2 | SYSTOLIC BLOOD PRESSURE: 113 MMHG | RESPIRATION RATE: 18 BRPM | HEIGHT: 63 IN

## 2018-04-18 DIAGNOSIS — Z00.00 ROUTINE GENERAL MEDICAL EXAMINATION AT A HEALTH CARE FACILITY: Primary | ICD-10-CM

## 2018-04-18 DIAGNOSIS — M85.80 OSTEOPENIA, UNSPECIFIED LOCATION: ICD-10-CM

## 2018-04-18 DIAGNOSIS — J30.89 OTHER ALLERGIC RHINITIS: ICD-10-CM

## 2018-04-18 DIAGNOSIS — J45.40 MODERATE PERSISTENT ASTHMA WITHOUT COMPLICATION: ICD-10-CM

## 2018-04-18 DIAGNOSIS — N95.2 POST-MENOPAUSAL ATROPHIC VAGINITIS: ICD-10-CM

## 2018-04-18 DIAGNOSIS — K21.9 GASTROESOPHAGEAL REFLUX DISEASE, ESOPHAGITIS PRESENCE NOT SPECIFIED: ICD-10-CM

## 2018-04-18 PROCEDURE — 99396 PREV VISIT EST AGE 40-64: CPT | Performed by: FAMILY MEDICINE

## 2018-04-18 RX ORDER — FLUTICASONE PROPIONATE 50 MCG
2 SPRAY, SUSPENSION (ML) NASAL DAILY
Qty: 48 G | Refills: 3 | Status: SHIPPED | OUTPATIENT
Start: 2018-04-18 | End: 2019-06-07

## 2018-04-18 RX ORDER — ESTRADIOL 0.5 MG/1
0.5 TABLET ORAL
Qty: 24 TABLET | Refills: 3 | Status: SHIPPED | OUTPATIENT
Start: 2018-04-19 | End: 2019-06-07

## 2018-04-18 RX ORDER — ALBUTEROL SULFATE 90 UG/1
2 AEROSOL, METERED RESPIRATORY (INHALATION) EVERY 4 HOURS PRN
Qty: 18 INHALER | Refills: 2 | Status: SHIPPED | OUTPATIENT
Start: 2018-04-18 | End: 2018-04-20

## 2018-04-18 ASSESSMENT — PAIN SCALES - GENERAL: PAINLEVEL: NO PAIN (0)

## 2018-04-18 NOTE — PATIENT INSTRUCTIONS
Thank you for choosing [facility]  You may be receiving a survey in the mail from Trace Martinez regarding your visit today.  Please take a few minutes to complete and return the survey to let us know how we are doing.      Our Clinic hours are:  Mondays    7:20 am - 7 pm  Tues -  Fri  7:20 am - 5 pm    Clinic Phone: [phone]    The clinic lab opens at 7:30 am Mon - Fri and appointments are required.    [facility]. [phone]  Monday-Thursday 8 am - 7pm  Tues/Wed/Fri 8 am - 5:30 pm           Preventive Health Recommendations  Female Ages 50 - 64    Yearly exam: See your health care provider every year in order to  o Review health changes.   o Discuss preventive care.    o Review your medicines if your doctor has prescribed any.      Get a Pap test every three years (unless you have an abnormal result and your provider advises testing more often).    If you get Pap tests with HPV test, you only need to test every 5 years, unless you have an abnormal result.     You do not need a Pap test if your uterus was removed (hysterectomy) and you have not had cancer.    You should be tested each year for STDs (sexually transmitted diseases) if you're at risk.     Have a mammogram every 1 to 2 years.    Have a colonoscopy at age 50, or have a yearly FIT test (stool test). These exams screen for colon cancer.      Have a cholesterol test every 5 years, or more often if advised.    Have a diabetes test (fasting glucose) every three years. If you are at risk for diabetes, you should have this test more often.     If you are at risk for osteoporosis (brittle bone disease), think about having a bone density scan (DEXA).    Shots: Get a flu shot each year. Get a tetanus shot every 10 years.    Nutrition:     Eat at least 5 servings of fruits and vegetables each day.    Eat whole-grain bread, whole-wheat pasta and brown rice instead of white grains and rice.    Talk to your provider about  Calcium and Vitamin D.     Lifestyle    Exercise at least 150 minutes a week (30 minutes a day, 5 days a week). This will help you control your weight and prevent disease.    Limit alcohol to one drink per day.    No smoking.     Wear sunscreen to prevent skin cancer.     See your dentist every six months for an exam and cleaning.    See your eye doctor every 1 to 2 years.

## 2018-04-18 NOTE — PROGRESS NOTES
SUBJECTIVE:   CC: Francesca Rivera is an 52 year old woman who presents for preventive health visit.     Healthy Habits:    Do you get at least three servings of calcium containing foods daily (dairy, green leafy vegetables, etc.)? yes    Amount of exercise or daily activities, outside of work: 3 day(s) per week    Problems taking medications regularly No    Medication side effects: No    Have you had an eye exam in the past two years? yes    Do you see a dentist twice per year? yes    Do you have sleep apnea, excessive snoring or daytime drowsiness?no      Asthma Follow-Up    Was ACT completed today?    Yes    ACT Total Scores 4/18/2018   ACT TOTAL SCORE -   ASTHMA ER VISITS -   ASTHMA HOSPITALIZATIONS -   ACT TOTAL SCORE (Goal Greater than or Equal to 20) 25   In the past 12 months, how many times did you visit the emergency room for your asthma without being admitted to the hospital? 0   In the past 12 months, how many times were you hospitalized overnight because of your asthma? 0       Recent asthma triggers that patient is dealing with: humidity        Today's PHQ-2 Score:   PHQ-2 ( 1999 Pfizer) 4/18/2018 2/15/2018   Q1: Little interest or pleasure in doing things 0 0   Q2: Feeling down, depressed or hopeless 0 0   PHQ-2 Score 0 0   Q1: Little interest or pleasure in doing things - -   Q2: Feeling down, depressed or hopeless - -   PHQ-2 Score - -       Abuse: Current or Past(Physical, Sexual or Emotional)- NO  Do you feel safe in your environment - YES    Social History   Substance Use Topics     Smoking status: Former Smoker     Packs/day: 1.00     Years: 28.00     Types: Cigarettes     Quit date: 5/20/2010     Smokeless tobacco: Never Used     Alcohol use Yes      Comment: occ     If you drink alcohol do you typically have >3 drinks per day or >7 drinks per week? No                     Reviewed orders with patient.  Reviewed health maintenance and updated orders accordingly - Yes  Labs reviewed in  "[facility]  BP Readings from Last 3 Encounters:   04/18/18 113/78   02/15/18 102/74   04/24/17 130/78    Wt Readings from Last 3 Encounters:   04/18/18 149 lb (67.6 kg)   02/15/18 146 lb 9.6 oz (66.5 kg)   04/24/17 144 lb 6.4 oz (65.5 kg)                    Patient over age 50, mutual decision to screen reflected in health maintenance.    Pertinent mammograms are reviewed under the imaging tab.  History of abnormal Pap smear: NO - age 30-65 PAP every 5 years with negative HPV co-testing recommended    Reviewed and updated as needed this visit by clinical staff  Tobacco  Fam Hx         Reviewed and updated as needed this visit by Provider        Past Medical History:   Diagnosis Date     Asthma      Tobacco use disorder 12/29/2005        ROS:  C: NEGATIVE for fever, chills, change in weight  INTEGUMENTARY/SKIN: POSITIVE for scaling brown spots, scattered  E: NEGATIVE for vision changes or irritation  ENT: NEGATIVE for ear, mouth and throat problems  R: NEGATIVE for significant cough or SOB  B: NEGATIVE for masses, tenderness or discharge  CV: NEGATIVE for chest pain, palpitations or peripheral edema  GI: NEGATIVE for nausea, abdominal pain, heartburn, or change in bowel habits  : NEGATIVE for unusual urinary or vaginal symptoms. No vaginal bleeding.  M: NEGATIVE for significant arthralgias or myalgia  N: NEGATIVE for weakness, dizziness or paresthesias  P: NEGATIVE for changes in mood or affect     OBJECTIVE:   /78  Pulse 79  Resp 18  Ht 5' 3.25\" (1.607 m)  Wt 149 lb (67.6 kg)  LMP 08/15/2006  Breastfeeding? No  BMI 26.19 kg/m2  EXAM:  GENERAL: healthy, alert and no distress  EYES: Eyes grossly normal to inspection, PERRL and conjunctivae and sclerae normal  HENT: ear canals and TM's normal, nose and mouth without ulcers or lesions  NECK: no adenopathy, no asymmetry, masses, or scars and thyroid normal to palpation  RESP: lungs clear to auscultation - no rales, rhonchi or wheezes  BREAST: normal without " masses, tenderness or nipple discharge and no palpable axillary masses or adenopathy  CV: regular rate and rhythm, normal S1 S2, no S3 or S4, no murmur, click or rub, no peripheral edema and peripheral pulses strong  ABDOMEN: soft, nontender, no hepatosplenomegaly, no masses and bowel sounds normal  MS: no gross musculoskeletal defects noted, no edema  SKIN: keratoses - seborrheic - scattered on back, flank  NEURO: Normal strength and tone, mentation intact and speech normal  PSYCH: mentation appears normal, affect normal/bright    ASSESSMENT/PLAN:   1. Routine general medical examination at a health care facility  To schedule mammogram and dexa     2. Post-menopausal atrophic vaginitis     - estradiol (ESTRACE) 0.5 MG tablet; Take 1 tablet (0.5 mg) by mouth twice a week  Dispense: 24 tablet; Refill: 3    3. Other allergic rhinitis     - fluticasone (FLONASE) 50 MCG/ACT spray; Spray 2 sprays into both nostrils daily  Dispense: 48 g; Refill: 3    4. Moderate persistent asthma without complication     - fluticasone-salmeterol (ADVAIR DISKUS) 250-50 MCG/DOSE diskus inhaler; Inhale 1 puff into the lungs 2 times daily  Dispense: 3 Inhaler; Refill: 3  - albuterol (VENTOLIN HFA) 108 (90 Base) MCG/ACT Inhaler; Inhale 2 puffs into the lungs every 4 hours as needed for shortness of breath / dyspnea or wheezing  Dispense: 18 Inhaler; Refill: 2    5. Gastroesophageal reflux disease, esophagitis presence not specified     - omeprazole (PRILOSEC) 20 MG CR capsule; Take 1 capsule (20 mg) by mouth daily  Dispense: 90 capsule; Refill: 3    6. Osteopenia, unspecified location   postmenopausal at age 39.  dexa 10 years ago already showed osteopenia, repeat that for further evaluation.   - DX Hip/Pelvis/Spine; Future    COUNSELING:   Reviewed preventive health counseling, as reflected in patient instructions       Regular exercise       Healthy diet/nutrition         reports that she quit smoking about 7 years ago. Her smoking use  "included Cigarettes. She has a 28.00 pack-year smoking history. She has never used smokeless tobacco.    Estimated body mass index is 26.19 kg/(m^2) as calculated from the following:    Height as of this encounter: 5' 3.25\" (1.607 m).    Weight as of this encounter: 149 lb (67.6 kg).   Weight management plan: Discussed healthy diet and exercise guidelines and patient will follow up in 12 months in clinic to re-evaluate.    Counseling Resources:  ATP IV Guidelines  Pooled Cohorts Equation Calculator  Breast Cancer Risk Calculator  FRAX Risk Assessment  ICSI Preventive Guidelines  Dietary Guidelines for Americans, 2010  USDA's MyPlate  ASA Prophylaxis  Lung CA Screening    Karol Yousif MD  [facility]  "

## 2018-04-18 NOTE — MR AVS SNAPSHOT
After Visit Summary   4/18/2018    Francesca Rivera    MRN: [id number]           Patient Information     Date Of Birth          1965        Visit Information        Provider Department      4/18/2018 7:20 AM Karol Yousif MD [facility]        Today's Diagnoses     Routine general medical examination at a health care facility    -  1    Post-menopausal atrophic vaginitis        Other allergic rhinitis        Moderate persistent asthma without complication        Gastroesophageal reflux disease, esophagitis presence not specified        Osteopenia, unspecified location          Care Instructions          Thank you for choosing [facility].  You may be receiving a survey in the mail from [employer] regarding your visit today.  Please take a few minutes to complete and return the survey to let us know how we are doing.      Our Clinic hours are:  Mondays    7:20 am - 7 pm  Tues -  Fri  7:20 am - 5 pm    Clinic Phone: [phone]    The clinic lab opens at 7:30 am Mon - Fri and appointments are required.    [facility] [place]  Ph. [phone]  Monday-Thursday 8 am - 7pm  Tues/Wed/Fri 8 am - 5:30 pm           Preventive Health Recommendations  Female Ages 50 - 64    Yearly exam: See your health care provider every year in order to  o Review health changes.   o Discuss preventive care.    o Review your medicines if your doctor has prescribed any.      Get a Pap test every three years (unless you have an abnormal result and your provider advises testing more often).    If you get Pap tests with HPV test, you only need to test every 5 years, unless you have an abnormal result.     You do not need a Pap test if your uterus was removed (hysterectomy) and you have not had cancer.    You should be tested each year for STDs (sexually transmitted diseases) if you're at risk.     Have a mammogram every 1 to 2 years.    Have a colonoscopy at age 50, or have a yearly FIT  test (stool test). These exams screen for colon cancer.      Have a cholesterol test every 5 years, or more often if advised.    Have a diabetes test (fasting glucose) every three years. If you are at risk for diabetes, you should have this test more often.     If you are at risk for osteoporosis (brittle bone disease), think about having a bone density scan (DEXA).    Shots: Get a flu shot each year. Get a tetanus shot every 10 years.    Nutrition:     Eat at least 5 servings of fruits and vegetables each day.    Eat whole-grain bread, whole-wheat pasta and brown rice instead of white grains and rice.    Talk to your provider about Calcium and Vitamin D.     Lifestyle    Exercise at least 150 minutes a week (30 minutes a day, 5 days a week). This will help you control your weight and prevent disease.    Limit alcohol to one drink per day.    No smoking.     Wear sunscreen to prevent skin cancer.     See your dentist every six months for an exam and cleaning.    See your eye doctor every 1 to 2 years.            Follow-ups after your visit        Future tests that were ordered for you today     Open Future Orders        Priority Expected Expires Ordered    DX Hip/Pelvis/Spine Routine  4/18/2019 4/18/2018            Who to contact     If you have questions or need follow up information about today's clinic visit or your schedule please contact [facility] directly at [phone].  Normal or non-critical lab and imaging results will be communicated to you by MyChart, letter or phone within 4 business days after the clinic has received the results. If you do not hear from us within 7 days, please contact the clinic through MyChart or phone. If you have a critical or abnormal lab result, we will notify you by phone as soon as possible.  Submit refill requests through [employer] or call your pharmacy and they will forward the refill request to us. Please allow 3 business days for your refill to be  "completed.          Additional Information About Your Visit        [employer] Information     [employer] gives you secure access to your electronic health record. If you see a primary care provider, you can also send messages to your care team and make appointments. If you have questions, please call your primary care clinic.  If you do not have a primary care provider, please call [phone] and they will assist you.        Care EveryWhere ID     This is your Care EveryWhere ID. This could be used by other organizations to access your [facility] medical records  [id number]        Your Vitals Were     Pulse Temperature Respirations Height Last Period Breastfeeding?    79 97.8  F (36.6  C) (Tympanic) 18 5' 3.25\" (1.607 m) 08/15/2006 No    BMI (Body Mass Index)                   26.19 kg/m2            Blood Pressure from Last 3 Encounters:   04/18/18 113/78   02/15/18 102/74   04/24/17 130/78    Weight from Last 3 Encounters:   04/18/18 149 lb (67.6 kg)   02/15/18 146 lb 9.6 oz (66.5 kg)   04/24/17 144 lb 6.4 oz (65.5 kg)              We Performed the Following     Asthma Action Plan (AAP)          Today's Medication Changes          These changes are accurate as of 4/18/18  7:43 AM.  If you have any questions, ask your nurse or doctor.               These medicines have changed or have updated prescriptions.        Dose/Directions    albuterol 108 (90 Base) MCG/ACT Inhaler   Commonly known as:  VENTOLIN HFA   This may have changed:  See the new instructions.   Used for:  Moderate persistent asthma without complication   Changed by:  Karol Yousif MD        Dose:  2 puff   Inhale 2 puffs into the lungs every 4 hours as needed for shortness of breath / dyspnea or wheezing   Quantity:  18 Inhaler   Refills:  2       estradiol 0.5 MG tablet   Commonly known as:  ESTRACE   This may have changed:    - how much to take  - how to take this  - when to take this  - additional instructions   Used for:  Post-menopausal atrophic " vaginitis   Changed by:  Karol Yousif MD        Dose:  0.5 mg   Start taking on:  4/19/2018   Take 1 tablet (0.5 mg) by mouth twice a week   Quantity:  24 tablet   Refills:  3       omeprazole 20 MG CR capsule   Commonly known as:  priLOSEC   This may have changed:  See the new instructions.   Used for:  Gastroesophageal reflux disease, esophagitis presence not specified   Changed by:  Karol Yousif MD        Dose:  20 mg   Take 1 capsule (20 mg) by mouth daily   Quantity:  90 capsule   Refills:  3         Stop taking these medicines if you haven't already. Please contact your care team if you have questions.     nitroFURantoin (macrocrystal-monohydrate) 100 MG capsule   Commonly known as:  MACROBID   Stopped by:  Karol Yousif MD                Where to get your medicines      These medications were sent to [facility] [address] to [address], [facility] [address]     Phone:  [phone]     albuterol 108 (90 Base) MCG/ACT Inhaler    estradiol 0.5 MG tablet    fluticasone 50 MCG/ACT spray    fluticasone-salmeterol 250-50 MCG/DOSE diskus inhaler    omeprazole 20 MG CR capsule                Primary Care Provider Office Phone # Fax #    Karol Yousif [phone] [address] [facility] [address]        Equal Access to Services     [facility] AH: Hadii aad ku hadasho Soomaali, waaxda luqadaha, qaybta kaalmada adeegyada, janet borja hayrobert longo . So [facility] [phone].    ATENCIÓN: Si habla español, tiene a cuellar disposición servicios gratuitos de asistencia lingüística. Llame al [phone].    We comply with applicable federal civil rights laws and [place] laws. We do not discriminate on the basis of race, color, national origin, age, disability, sex, sexual orientation, or gender identity.            Thank you!     Thank you for choosing [facility]   for your care. Our goal is always to provide you with excellent care. Hearing back from our patients is one way we can continue to improve our services. Please take a few minutes to complete the written survey that you may receive in the mail after your visit with us. Thank you!             Your Updated Medication List - Protect others around you: Learn how to safely use, store and throw away your medicines at www.disposemymeds.org.          This list is accurate as of 4/18/18  7:43 AM.  Always use your most recent med list.                   Brand Name Dispense Instructions for use Diagnosis    ADVIL 200 MG capsule   Generic drug:  ibuprofen      prn        albuterol 108 (90 Base) MCG/ACT Inhaler    VENTOLIN HFA    18 Inhaler    Inhale 2 puffs into the lungs every 4 hours as needed for shortness of breath / dyspnea or wheezing    Moderate persistent asthma without complication       augmented betamethasone dipropionate 0.05 % cream    DIPROLENE-AF    30 g    Apply topically twice a week    Circumscribed scleroderma       CITRACAL + D 250-62.5 MG-UNIT Tabs   Generic drug:  CALCIUM CITRATE-VITAMIN D           estradiol 0.5 MG tablet   Start taking on:  4/19/2018    ESTRACE    24 tablet    Take 1 tablet (0.5 mg) by mouth twice a week    Post-menopausal atrophic vaginitis       FLAXSEED OIL PO      1tab twice daily        fluticasone 50 MCG/ACT spray    FLONASE    48 g    Spray 2 sprays into both nostrils daily    Other allergic rhinitis       fluticasone-salmeterol 250-50 MCG/DOSE diskus inhaler    ADVAIR DISKUS    3 Inhaler    Inhale 1 puff into the lungs 2 times daily    Moderate persistent asthma without complication       omeprazole 20 MG CR capsule    priLOSEC    90 capsule    Take 1 capsule (20 mg) by mouth daily    Gastroesophageal reflux disease, esophagitis presence not specified

## 2018-04-18 NOTE — NURSING NOTE
"Chief Complaint   Patient presents with     Physical       Initial /78  Pulse 79  Resp 18  Ht 5' 3.25\" (1.607 m)  Wt 149 lb (67.6 kg)  LMP 08/15/2006  Breastfeeding? No  BMI 26.19 kg/m2 Estimated body mass index is 26.19 kg/(m^2) as calculated from the following:    Height as of this encounter: 5' 3.25\" (1.607 m).    Weight as of this encounter: 149 lb (67.6 kg).  Medication Reconciliation: complete    "

## 2018-04-19 ASSESSMENT — ASTHMA QUESTIONNAIRES: ACT_TOTALSCORE: 25

## 2018-04-20 ENCOUNTER — TELEPHONE (OUTPATIENT)
Dept: FAMILY MEDICINE | Facility: CLINIC | Age: 53
End: 2018-04-20

## 2018-04-20 DIAGNOSIS — J45.40 MODERATE PERSISTENT ASTHMA WITHOUT COMPLICATION: ICD-10-CM

## 2018-04-20 RX ORDER — ALBUTEROL SULFATE 90 UG/1
2 AEROSOL, METERED RESPIRATORY (INHALATION) EVERY 4 HOURS PRN
Qty: 18 INHALER | Refills: 2 | Status: SHIPPED | OUTPATIENT
Start: 2018-04-20 | End: 2019-06-07

## 2018-04-20 NOTE — TELEPHONE ENCOUNTER
Received fax from [facility] pharmacy stating Ventolin is not on formulary. Suggested alternatives include: levalbuterol tartrate CFC-free aerosol, ProAir HFA, ProAir Respiclick.     Rachel [facility] [place]

## 2018-05-17 ENCOUNTER — HOSPITAL ENCOUNTER (OUTPATIENT)
Dept: MAMMOGRAPHY | Facility: CLINIC | Age: 53
End: 2018-05-17
Attending: FAMILY MEDICINE
Payer: COMMERCIAL

## 2018-05-17 ENCOUNTER — HOSPITAL ENCOUNTER (OUTPATIENT)
Dept: BONE DENSITY | Facility: CLINIC | Age: 53
Discharge: HOME | End: 2018-05-17
Attending: FAMILY MEDICINE | Admitting: FAMILY MEDICINE
Payer: COMMERCIAL

## 2018-05-17 DIAGNOSIS — M85.80 OSTEOPENIA, UNSPECIFIED LOCATION: ICD-10-CM

## 2018-05-17 DIAGNOSIS — Z12.31 VISIT FOR SCREENING MAMMOGRAM: ICD-10-CM

## 2018-05-17 PROCEDURE — 77067 SCR MAMMO BI INCL CAD: CPT

## 2018-05-17 PROCEDURE — 77080 DXA BONE DENSITY AXIAL: CPT

## 2018-05-18 NOTE — PROGRESS NOTES
The bone density test shows osteoporosis of the lumbar spine. Hips are actually a bit better than they were in 2010, but still thin (osteopenia)  People with osteopenia should work on taking in 6715-4932 mg of calcium with vitamin D daily. They should also be getting daily weight bearing exercise (walking works)  I would also like to see you to discuss the risks of fracture as well as your young age, there may be benefit to starting a bisphosphonate like Fosamax (Alendronate).      Karol Yousif M.D.

## 2018-06-08 ENCOUNTER — OFFICE VISIT (OUTPATIENT)
Dept: FAMILY MEDICINE | Facility: CLINIC | Age: 53
End: 2018-06-08
Payer: COMMERCIAL

## 2018-06-08 VITALS
SYSTOLIC BLOOD PRESSURE: 105 MMHG | BODY MASS INDEX: 27.53 KG/M2 | TEMPERATURE: 98.9 F | HEART RATE: 67 BPM | HEIGHT: 63 IN | WEIGHT: 155.38 LBS | DIASTOLIC BLOOD PRESSURE: 71 MMHG

## 2018-06-08 DIAGNOSIS — M81.0 AGE-RELATED OSTEOPOROSIS WITHOUT CURRENT PATHOLOGICAL FRACTURE: Primary | ICD-10-CM

## 2018-06-08 PROCEDURE — 99213 OFFICE O/P EST LOW 20 MIN: CPT | Performed by: FAMILY MEDICINE

## 2018-06-08 NOTE — MR AVS SNAPSHOT
"              After Visit Summary   6/8/2018    Francesca Rivera    MRN: [id number]           Patient Information     Date Of Birth          1965        Visit Information        Provider Department      6/8/2018 11:20 AM Karol Yousif MD [facility]        Today's Diagnoses     Age-related osteoporosis without current pathological fracture    -  1       Follow-ups after your visit        Who to contact     If you have questions or need follow up information about today's clinic visit or your schedule please contact [facility] directly at [phone].  Normal or non-critical lab and imaging results will be communicated to you by [employer], letter or phone within 4 business days after the clinic has received the results. If you do not hear from us within 7 days, please contact the clinic through [employer] or phone. If you have a critical or abnormal lab result, we will notify you by phone as soon as possible.  Submit refill requests through [employer] or call your pharmacy and they will forward the refill request to us. Please allow 3 business days for your refill to be completed.          Additional Information About Your Visit        MyChart Information     [employer] gives you secure access to your electronic health record. If you see a primary care provider, you can also send messages to your care team and make appointments. If you have questions, please call your primary care clinic.  If you do not have a primary care provider, please call [phone] and they will assist you.        Care EveryWhere ID     This is your Care EveryWhere ID. This could be used by other organizations to access your [facility] medical records  [id number]        Your Vitals Were     Pulse Temperature Height Last Period BMI (Body Mass Index)       67 98.9  F (37.2  C) (Tympanic) 5' 3.25\" (1.607 m) 08/15/2006 27.31 kg/m2        Blood Pressure from Last 3 Encounters:   06/08/18 105/71   04/18/18 " 113/78   02/15/18 102/74    Weight from Last 3 Encounters:   06/08/18 155 lb 6 oz (70.5 kg)   04/18/18 149 lb (67.6 kg)   02/15/18 146 lb 9.6 oz (66.5 kg)              Today, you had the following     No orders found for display       Primary Care Provider Office Phone # Fax #    Karol Yousif [phone] [address] [facility] [address]        Equal Access to Services     GIANNI CANTOR : Hadii aad ku hadasho Soomaali, waaxda luqadaha, qaybta kaalmada adeegyada, waxay idiin hayaan adeeg kharash donta . So [facility] [phone].    ATENCIÓN: Si habla español, tiene a cuellar disposición servicios gratuitos de asistencia lingüística. Llame al [phone].    We comply with applicable federal civil rights laws and [place] laws. We do not discriminate on the basis of race, color, national origin, age, disability, sex, sexual orientation, or gender identity.            Thank you!     Thank you for choosing [facility]  for your care. Our goal is always to provide you with excellent care. Hearing back from our patients is one way we can continue to improve our services. Please take a few minutes to complete the written survey that you may receive in the mail after your visit with us. Thank you!             Your Updated Medication List - Protect others around you: Learn how to safely use, store and throw away your medicines at www.disposemymeds.org.          This list is accurate as of 6/8/18 12:02 PM.  Always use your most recent med list.                   Brand Name Dispense Instructions for use Diagnosis    ADVIL 200 MG capsule   Generic drug:  ibuprofen      prn        albuterol 108 (90 Base) MCG/ACT Inhaler    VENTOLIN HFA    18 Inhaler    Inhale 2 puffs into the lungs every 4 hours as needed for shortness of breath / dyspnea or wheezing FILL ALBUTEROL BRAND/GENERIC AS COVERED    Moderate persistent asthma without complication       augmented betamethasone dipropionate  0.05 % cream    DIPROLENE-AF    30 g    Apply topically twice a week    Circumscribed scleroderma       CITRACAL + D 250-62.5 MG-UNIT Tabs   Generic drug:  CALCIUM CITRATE-VITAMIN D           estradiol 0.5 MG tablet    ESTRACE    24 tablet    Take 1 tablet (0.5 mg) by mouth twice a week    Post-menopausal atrophic vaginitis       FLAXSEED OIL PO      1tab twice daily        fluticasone 50 MCG/ACT spray    FLONASE    48 g    Spray 2 sprays into both nostrils daily    Other allergic rhinitis       fluticasone-salmeterol 250-50 MCG/DOSE diskus inhaler    ADVAIR DISKUS    3 Inhaler    Inhale 1 puff into the lungs 2 times daily    Moderate persistent asthma without complication       omeprazole 20 MG CR capsule    priLOSEC    90 capsule    Take 1 capsule (20 mg) by mouth daily    Gastroesophageal reflux disease, esophagitis presence not specified

## 2018-06-08 NOTE — PROGRESS NOTES
SUBJECTIVE:   Francesca Rivera is a 52 year old female who presents to clinic today for the following health issues:      - Follow up after having DEXA completed 5/17/2018.     DX HIP/PELVIS/SPINE 5/17/2018 2:45 PM     HISTORY: Postmenopausal.     TECHNIQUE: The lumbar spine and bilateral hips  are scanned with DXA  technique performed using a Biomonitor scanner.  DXA results are  reported according to T-score.  The T-score is the standard deviation  from the peak bone mass in a normal young adult patient.  A T-score of  -1.0 to -2.5 correlates with osteopenia.  A T-score of less than -2.5  correlates with osteoporosis.     In accordance with the ISCD (International Society of Clinical  Densitometry) the lowest BMD between the total hip and femoral neck is  reported.      COMPARISON: 11/24/2010.         IMPRESSION:  1. The T-score of the lumbar spine on today's study in the region of  L1-L4 is -2.3 which correlates with severe osteopenia. This T-score is  unchanged from the prior study. If one looks at the L1-L2 region alone  the T-score is -2.9 which correlates with osteoporosis. This T-score  has worsened compared to the prior study where was -2.5.     2. The T-score of the right femoral neck on today's study is -2.1  which correlates with severe osteopenia. This T-score has slightly  improved compared to prior study where it was -2.2.     3.  The T-score of the left femoral neck on today's study is -2.1  which correlates with severe osteopenia. This T-score has improved  compared to the prior study where it was -2.3.     4.  The bone mineral density of the worst hip is 0.740 g/cm2.  This  has improved compared to the prior study where it was 0.735 g/cm2.     SUSANA KITCHEN MD    FRAX calculation done.  Hip fracture risk is 2.5% in 10 years, major osteoporotic fracture risk is 13%.      Concern over the osteoporosis at L1-2.    Hips have improved over the past 8 years.      Risks, benefits and alternatives  "discussed for bisphosphonates regarding esophagitis (already has GERD), osteonecrosis of the jaw, etc.      /71  Pulse 67  Temp 98.9  F (37.2  C) (Tympanic)  Ht 5' 3.25\" (1.607 m)  Wt 155 lb 6 oz (70.5 kg)  LMP 08/15/2006  BMI 27.31 kg/m2  EXAM: no exam today      ASSESSMENT/PLAN:      ICD-10-CM    1. Age-related osteoporosis without current pathological fracture M81.0        Risks, benefits and alternatives discussed for treatment.  Patient will continue weight bearing exercise, calcium, vitamin D.  She declined starting bisphosphonates at this time.  Repeat DEXA in 2 years.     Karol Yousif M.D.          "

## 2018-12-21 ENCOUNTER — OFFICE VISIT (OUTPATIENT)
Dept: FAMILY MEDICINE | Facility: CLINIC | Age: 53
End: 2018-12-21
Payer: COMMERCIAL

## 2018-12-21 VITALS
DIASTOLIC BLOOD PRESSURE: 89 MMHG | WEIGHT: 167 LBS | HEIGHT: 63 IN | TEMPERATURE: 98.5 F | HEART RATE: 72 BPM | RESPIRATION RATE: 16 BRPM | BODY MASS INDEX: 29.59 KG/M2 | SYSTOLIC BLOOD PRESSURE: 120 MMHG | OXYGEN SATURATION: 99 %

## 2018-12-21 DIAGNOSIS — J20.9 ACUTE BRONCHITIS WITH SYMPTOMS > 10 DAYS: Primary | ICD-10-CM

## 2018-12-21 PROCEDURE — 99213 OFFICE O/P EST LOW 20 MIN: CPT | Performed by: NURSE PRACTITIONER

## 2018-12-21 RX ORDER — PREDNISONE 20 MG/1
20 TABLET ORAL DAILY
Qty: 7 TABLET | Refills: 0 | Status: SHIPPED | OUTPATIENT
Start: 2018-12-21 | End: 2018-12-28

## 2018-12-21 RX ORDER — AZITHROMYCIN 250 MG/1
TABLET, FILM COATED ORAL
Qty: 6 TABLET | Refills: 0 | Status: SHIPPED | OUTPATIENT
Start: 2018-12-21 | End: 2019-01-07

## 2018-12-21 ASSESSMENT — MIFFLIN-ST. JEOR: SCORE: 1340.6

## 2018-12-21 NOTE — PROGRESS NOTES
SUBJECTIVE:   Francesca Rivera is a 53 year old female who presents to clinic today for the following health issues:      ENT Symptoms             Symptoms: cc Present Absent Comment   Fever/Chills   x    Fatigue   x    Muscle Aches   x    Eye Irritation   x    Sneezing   x    Nasal Laex/Drg  x     Sinus Pressure/Pain   x    Loss of smell   x    Dental pain   x    Sore Throat  x     Swollen Glands   x    Ear Pain/Fullness   x    Cough x x  Chest congestion   Wheeze   x    Chest Pain   x    Shortness of breath   x    Rash   x    Other         Symptom duration:  over 2 months   Symptom severity:  mod   Treatments tried:  nothing   Contacts:  work             Problem list and histories reviewed & adjusted, as indicated.  Additional history: states she has a history of asthma but this feels differently, she cannot seem to shake her cough, which she feels is deep.  She's becoming more fatigued with this. Is complaint with inhalers and has had flu shot this year.      Patient Active Problem List   Diagnosis     Mild intermittent asthma     Allergic rhinitis     Esophageal reflux     Asymptomatic postmenopausal status     Post-menopausal atrophic vaginitis     CARDIOVASCULAR SCREENING; LDL GOAL LESS THAN 160     Lichen sclerosis et atrophicus     Seasonal Moderate persistent asthma     Age-related osteoporosis without current pathological fracture     Past Surgical History:   Procedure Laterality Date     COLONOSCOPY N/A 2016    Procedure: COLONOSCOPY;  Surgeon: Darell Mayes MD;  Location: [facility]     TUBAL LIGATION         Social History     Tobacco Use     Smoking status: Former Smoker     Packs/day: 1.00     Years: 28.00     Pack years: 28.00     Types: Cigarettes     Last attempt to quit: 2010     Years since quittin.5     Smokeless tobacco: Never Used   Substance Use Topics     Alcohol use: Yes     Comment: occ     Family History   Problem Relation Age of Onset     Alcohol/Drug Father       Diabetes Father      Hypertension Father      Psychotic Disorder Father         bipolar     Asthma Father      Cancer - colorectal Father 60     Depression Father      Cancer Maternal Grandfather         lung     Arthritis Maternal Grandfather      Cancer Paternal Grandmother         stomach     Psychotic Disorder Paternal Grandmother         bipolar     Depression Paternal Grandmother      Arthritis Paternal Grandmother      Breast Cancer Maternal Grandmother      Cancer - colorectal Maternal Grandmother      Arthritis Maternal Grandmother      Depression Daughter      Lupus Mother      C.A.D. Paternal Grandfather      Cerebrovascular Disease Paternal Grandfather      Arthritis Paternal Grandfather      Depression Son          Current Outpatient Medications   Medication Sig Dispense Refill     ADVIL 200 MG OR CAPS prn       albuterol (VENTOLIN HFA) 108 (90 Base) MCG/ACT Inhaler Inhale 2 puffs into the lungs every 4 hours as needed for shortness of breath / dyspnea or wheezing FILL ALBUTEROL BRAND/GENERIC AS COVERED 18 Inhaler 2     augmented betamethasone dipropionate (DIPROLENE-AF) 0.05 % cream Apply topically twice a week 30 g 3     azithromycin (ZITHROMAX) 250 MG tablet Two tablets first day, then one tablet daily for four days. 6 tablet 0     CITRACAL + D 250-62.5 MG-UNIT OR TABS   0     estradiol (ESTRACE) 0.5 MG tablet Take 1 tablet (0.5 mg) by mouth twice a week 24 tablet 3     FLAXSEED OIL OR 1tab twice daily       fluticasone (FLONASE) 50 MCG/ACT spray Spray 2 sprays into both nostrils daily 48 g 3     fluticasone-salmeterol (ADVAIR DISKUS) 250-50 MCG/DOSE diskus inhaler Inhale 1 puff into the lungs 2 times daily 3 Inhaler 3     omeprazole (PRILOSEC) 20 MG CR capsule Take 1 capsule (20 mg) by mouth daily 90 capsule 3     predniSONE (DELTASONE) 20 MG tablet Take 20 mg by mouth daily for 7 days. 7 tablet 0     Allergies   Allergen Reactions     Nka [No Known Allergies]      BP Readings from Last 3  "Encounters:   12/21/18 120/89   06/08/18 105/71   04/18/18 113/78    Wt Readings from Last 3 Encounters:   12/21/18 75.8 kg (167 lb)   06/08/18 70.5 kg (155 lb 6 oz)   04/18/18 67.6 kg (149 lb)                    Reviewed and updated as needed this visit by clinical staff  Tobacco  Allergies  Meds  Med Hx  Surg Hx  Fam Hx  Soc Hx      Reviewed and updated as needed this visit by Provider          ROS: 10 point ROS neg other than the symptoms noted above in the HPI.    OBJECTIVE:     /89   Pulse 72   Temp 98.5  F (36.9  C) (Tympanic)   Resp 16   Ht 1.607 m (5' 3.25\")   Wt 75.8 kg (167 lb)   LMP 08/15/2006   SpO2 99%   BMI 29.35 kg/m    Body mass index is 29.35 kg/m .  GENERAL: healthy, alert and no distress  HENT: ear canals and TM's normal, pharynx with mild erythema, no sinus tenderness  NECK: no adenopathy, no asymmetry  RESP: lungs clear to auscultation - no rales, rhonchi or wheezes, prolonged expiratory phase  CV: regular rate and rhythm, normal S1 S2, no S3 or S4, no murmur  MS: no gross musculoskeletal defects noted      Diagnostic Test Results:  No results found for this or any previous visit (from the past 24 hour(s)).    ASSESSMENT/PLAN:             1. Acute bronchitis with symptoms > 10 days    - azithromycin (ZITHROMAX) 250 MG tablet; Two tablets first day, then one tablet daily for four days.  Dispense: 6 tablet; Refill: 0  - predniSONE (DELTASONE) 20 MG tablet; Take 20 mg by mouth daily for 7 days.  Dispense: 7 tablet; Refill: 0  Discussed how to take the medication(s), expected outcomes, potential side effects.    See Patient Instructions  Follow up if symptoms persist or worsen and as needed.    Patient Instructions                      Acute Bronchitis                  What is acute bronchitis?   Bronchitis is an infection of the air passages--that is, the tubes that connect the windpipe to the lungs. It causes swelling and irritation of the airways. With acute bronchitis you " usually have a cough that produces phlegm and pain behind the breastbone when you breathe deeply or cough.   How does it occur?   Bronchitis often occurs with viral infections of the respiratory tract, such as colds and flu. Bronchitis may also be caused by bacterial infections. It may occur with childhood illnesses such as measles and whooping cough.   Attacks are most frequent during the winter or when the level of air pollution is high.   Infants, young children, older adults, smokers, and people with heart disease or lung disease (including asthma and allergies) are most likely to get acute bronchitis.   What are the symptoms?   Symptoms may include:   a deep cough that produces yellowish or greenish phlegm   pain behind the breastbone when you breathe deeply or cough   wheezing   feeling short of breath   fever   chills   headache   sore muscles.   How is it diagnosed?   Your healthcare provider will ask about your symptoms and examine you. You may have tests, such as:   a test of phlegm to look for bacteria   chest X-ray   blood tests.   How is it treated?   Acute bronchitis often does not require medical treatment. Resting at home and drinking plenty of fluids to keep the mucus loose may be all you need to do to get better in a few days. If your symptoms are severe or you have other health problems (such as heart or lung disease or diabetes), you may need to take antibiotics.   How long will the effects last?   Most of the time acute bronchitis clears up in a few days. Your cough may slowly get better in 1 to 2 weeks.   It may take you longer to recover if:   You are a smoker.   You live in an area where air pollution is a problem.   You have a heart or lung disease.   You have any other continuing health problems.   How can I take care of myself?   You can help yourself by:   following the full treatment your healthcare provider recommends   using a vaporizer, humidifier, or steam from hot water to add  moisture to the air   drinking plenty of liquids   taking cough medicine if recommended by your healthcare provider   resting in bed   taking aspirin or acetaminophen to reduce fever and relieve headache and muscle pain (Check with your healthcare provider before you give any medicine that contains aspirin or salicylates to a child or teen. This includes medicines like baby aspirin, some cold medicines, and Pepto Bismol. Children and teens who take aspirin are at risk for a serious illness called Reye's syndrome.)   eating healthy meals.   Call your healthcare provider if:   You have trouble breathing.   You have a fever of 101.5?F (38.6?C) or higher.   You cough up blood.   Your symptoms are getting worse instead of better.   You don't start to feel better after 3 days of treatment.   You have any symptoms that concern you.   How can I help prevent acute bronchitis?   To reduce your risk of getting a respiratory infection:   Do not smoke.   Wash your hands often, especially when you are around people with colds (upper respiratory infections).   If you have asthma or allergies, keep your symptoms under good control.   Get regular exercise.   Eat healthy foods.       Published by [employer].  This content is reviewed periodically and is subject to change as new health information becomes available. The information is intended to inform and educate and is not a replacement for medical evaluation, advice, diagnosis or treatment by a healthcare professional.   Developed by [employer].   ? 2010 [employer] and/or its affiliates. All Rights Reserved.   Copyright   Clinical Reference Systems 2011              Thank you for choosing [facility].  You may be receiving a survey in the mail from [facility] regarding your visit today.  Please take a few minutes to complete and return the survey to let us know how we are doing.      Our Clinic hours are:  Mondays    7:20 am - 7 pm  Tues -  Fri  7:20 am - 5 pm    Clinic  Phone: [phone]    The clinic lab opens at 7:30 am Mon - Fri and appointments are required.    [facility] [place]  Ph. [phone]  Monday  8 am - 7pm  Tues - Fri 8 am - 5:30 pm             JAZIEL Weaver [facility]

## 2018-12-21 NOTE — PATIENT INSTRUCTIONS
Acute Bronchitis                  What is acute bronchitis?   Bronchitis is an infection of the air passages--that is, the tubes that connect the windpipe to the lungs. It causes swelling and irritation of the airways. With acute bronchitis you usually have a cough that produces phlegm and pain behind the breastbone when you breathe deeply or cough.   How does it occur?   Bronchitis often occurs with viral infections of the respiratory tract, such as colds and flu. Bronchitis may also be caused by bacterial infections. It may occur with childhood illnesses such as measles and whooping cough.   Attacks are most frequent during the winter or when the level of air pollution is high.   Infants, young children, older adults, smokers, and people with heart disease or lung disease (including asthma and allergies) are most likely to get acute bronchitis.   What are the symptoms?   Symptoms may include:   a deep cough that produces yellowish or greenish phlegm   pain behind the breastbone when you breathe deeply or cough   wheezing   feeling short of breath   fever   chills   headache   sore muscles.   How is it diagnosed?   Your healthcare provider will ask about your symptoms and examine you. You may have tests, such as:   a test of phlegm to look for bacteria   chest X-ray   blood tests.   How is it treated?   Acute bronchitis often does not require medical treatment. Resting at home and drinking plenty of fluids to keep the mucus loose may be all you need to do to get better in a few days. If your symptoms are severe or you have other health problems (such as heart or lung disease or diabetes), you may need to take antibiotics.   How long will the effects last?   Most of the time acute bronchitis clears up in a few days. Your cough may slowly get better in 1 to 2 weeks.   It may take you longer to recover if:   You are a smoker.   You live in an area where air pollution is a problem.   You have a heart  or lung disease.   You have any other continuing health problems.   How can I take care of myself?   You can help yourself by:   following the full treatment your healthcare provider recommends   using a vaporizer, humidifier, or steam from hot water to add moisture to the air   drinking plenty of liquids   taking cough medicine if recommended by your healthcare provider   resting in bed   taking aspirin or acetaminophen to reduce fever and relieve headache and muscle pain (Check with your healthcare provider before you give any medicine that contains aspirin or salicylates to a child or teen. This includes medicines like baby aspirin, some cold medicines, and Pepto Bismol. Children and teens who take aspirin are at risk for a serious illness called Reye's syndrome.)   eating healthy meals.   Call your healthcare provider if:   You have trouble breathing.   You have a fever of 101.5?F (38.6?C) or higher.   You cough up blood.   Your symptoms are getting worse instead of better.   You don't start to feel better after 3 days of treatment.   You have any symptoms that concern you.   How can I help prevent acute bronchitis?   To reduce your risk of getting a respiratory infection:   Do not smoke.   Wash your hands often, especially when you are around people with colds (upper respiratory infections).   If you have asthma or allergies, keep your symptoms under good control.   Get regular exercise.   Eat healthy foods.       Published by [employer].  This content is reviewed periodically and is subject to change as new health information becomes available. The information is intended to inform and educate and is not a replacement for medical evaluation, advice, diagnosis or treatment by a healthcare professional.   Developed by [employer].   ? 2010 [facility] and/or its affiliates. All Rights Reserved.   Copyright   Clinical Reference Systems 2011              Thank you for choosing [facility].  You may be  receiving a survey in the mail from [employer] regarding your visit today.  Please take a few minutes to complete and return the survey to let us know how we are doing.      Our Clinic hours are:  Mondays    7:20 am - 7 pm  Tues -  Fri  7:20 am - 5 pm    Clinic Phone: [phone]    The clinic lab opens at 7:30 am Mon - Fri and appointments are required.    [facility]  Ph. [phone]  Monday  8 am - 7pm  Tues - Fri 8 am - 5:30 pm

## 2018-12-26 ENCOUNTER — MYC MEDICAL ADVICE (OUTPATIENT)
Dept: FAMILY MEDICINE | Facility: CLINIC | Age: 53
End: 2018-12-26

## 2018-12-26 DIAGNOSIS — R05.9 COUGH: ICD-10-CM

## 2018-12-26 DIAGNOSIS — J20.9 ACUTE BRONCHITIS WITH SYMPTOMS > 10 DAYS: Primary | ICD-10-CM

## 2018-12-26 NOTE — TELEPHONE ENCOUNTER
Patient called back stating that she was planning on coming in on 12/31 wants order to be future.   Patient schedule for 12/31 at 830a (Did not want to schedule due order not being correct-- please schedule if have time.)    Makeda MORAN  Station

## 2018-12-26 NOTE — TELEPHONE ENCOUNTER
Covering prodiver:  Please see mychart.  Bronchitis symptoms persist after z-lisa treatment.  Per patient chest x-ray if no improvement - OV notes (below) do not reflect this.  Chest X-ray ready.    OV notes 12-21-18:  Acute bronchitis with symptoms > 10 days:   - azithromycin (ZITHROMAX) 250 MG tablet; Two tablets first day, then one tablet daily for four days.  Dispense: 6 tablet; Refill: 0  - predniSONE (DELTASONE) 20 MG tablet; Take 20 mg by mouth daily for 7 days.  Dispense: 7 tablet; Refill: 0  Discussed how to take the medication(s), expected outcomes, potential side effects.     See Patient Instructions  Follow up if symptoms persist or worsen and as needed.    Routing to provider.  Lo STEELE RN

## 2018-12-26 NOTE — TELEPHONE ENCOUNTER
Order for chest x-ray has been ordered for today.  Let patient know we will update her when results return.Rosa Maria Shore RNC, NP

## 2018-12-31 ENCOUNTER — ANCILLARY PROCEDURE (OUTPATIENT)
Dept: GENERAL RADIOLOGY | Facility: CLINIC | Age: 53
End: 2018-12-31
Attending: NURSE PRACTITIONER
Payer: COMMERCIAL

## 2018-12-31 DIAGNOSIS — J20.9 ACUTE BRONCHITIS WITH SYMPTOMS > 10 DAYS: ICD-10-CM

## 2018-12-31 PROCEDURE — 71046 X-RAY EXAM CHEST 2 VIEWS: CPT | Mod: FY

## 2019-01-03 ENCOUNTER — MYC MEDICAL ADVICE (OUTPATIENT)
Dept: FAMILY MEDICINE | Facility: CLINIC | Age: 54
End: 2019-01-03

## 2019-01-03 NOTE — TELEPHONE ENCOUNTER
Dr. Yousif,  Patient is wondering what the plan is going forward, patient was seen by Lynn Tyler on 12-21-18 for bronchitis, patient says she felt better on the medication but since finished, she feels like her symptoms are the same, CXray was negative on 12-31-18, states is not short of breath, no fever, no wheezing, but reports has a lingering cough, swallows secretions, patient is wondering if she should repeat course of antibiotics/prednisone. Aware Lynn is not in and is requesting PCP to advise.      ARNAUD Mccall

## 2019-01-03 NOTE — TELEPHONE ENCOUNTER
"Bronchitis can take several weeks to go away, even with treatment.  I wouldn't repeat the steroid or the azithromycin without a visit to listen again to lungs and complete an exam.    Vitals were very normal last visit, oxygen sats 99%, etc.     It's quite possible with 28 years of smoking, even having quit 8+ years ago, that there is some COPD which can look like \"chronic bronchitis\".  Next step would probably be pulmonary function testing to see what lung functions are, as she may benefit from a change in inhalers.  Is she using the Advair 1 puff twice daily?  Is she using the albuterol four times a day at this point?    Karol Yousif M.D.    "

## 2019-01-03 NOTE — TELEPHONE ENCOUNTER
Patient says she is using the Advair 1 puff daily, has not been using the Albuterol, did advise to use this as directed to see if it helps. Patient given PCP message.  Patient not able to be seen tomorrow, appointment is scheduled for Monday at 2:20 (Taylor ok to use the hold).     ARNAUD Mccall

## 2019-01-07 ENCOUNTER — OFFICE VISIT (OUTPATIENT)
Dept: FAMILY MEDICINE | Facility: CLINIC | Age: 54
End: 2019-01-07
Payer: COMMERCIAL

## 2019-01-07 VITALS
WEIGHT: 166 LBS | HEIGHT: 63 IN | DIASTOLIC BLOOD PRESSURE: 68 MMHG | SYSTOLIC BLOOD PRESSURE: 116 MMHG | OXYGEN SATURATION: 95 % | TEMPERATURE: 98.9 F | HEART RATE: 73 BPM | RESPIRATION RATE: 18 BRPM | BODY MASS INDEX: 29.41 KG/M2

## 2019-01-07 DIAGNOSIS — J45.40 MODERATE PERSISTENT ASTHMA WITHOUT COMPLICATION: Primary | ICD-10-CM

## 2019-01-07 DIAGNOSIS — R05.3 CHRONIC COUGH: ICD-10-CM

## 2019-01-07 PROCEDURE — 99213 OFFICE O/P EST LOW 20 MIN: CPT | Performed by: FAMILY MEDICINE

## 2019-01-07 ASSESSMENT — MIFFLIN-ST. JEOR: SCORE: 1336.06

## 2019-01-07 ASSESSMENT — PAIN SCALES - GENERAL: PAINLEVEL: NO PAIN (0)

## 2019-01-07 NOTE — PATIENT INSTRUCTIONS
Thank you for choosing [facility].  You may be receiving a survey in the mail from [facility] regarding your visit today.  Please take a few minutes to complete and return the survey to let us know how we are doing.      Our Clinic hours are:  Mondays    7:20 am - 7 pm  Tues - Fri  7:20 am - 5 pm    Clinic Phone: [phone]    The clinic lab opens at 7:30 am Mon - Fri and appointments are required.    [facility]. [phone]  Monday  8 am - 7pm  Tues - Fri 8 am - 5:30 pm

## 2019-01-07 NOTE — PROGRESS NOTES
"  SUBJECTIVE:   Francesca Rivera is a 53 year old female who presents to clinic today for the following health issues:      Asthma Follow-Up    Was ACT completed today?    Yes    ACT Total Scores 1/7/2019   ACT TOTAL SCORE -   ASTHMA ER VISITS -   ASTHMA HOSPITALIZATIONS -   ACT TOTAL SCORE (Goal Greater than or Equal to 20) 23   In the past 12 months, how many times did you visit the emergency room for your asthma without being admitted to the hospital? 0   In the past 12 months, how many times were you hospitalized overnight because of your asthma? 0       Recent asthma triggers that patient is dealing with: humidity        Amount of exercise or physical activity: 6-7 days/week for an average of 15-30 minutes    Problems taking medications regularly: No    Medication side effects: none    Diet: regular (no restrictions)      Acute Illness   Acute illness concerns: ongoing cough   Onset: 3 months    Fever: no     Chills/Sweats: no     Headache (location?): no     Sinus Pressure:no    Conjunctivitis:  no    Ear Pain: no    Rhinorrhea: no     Congestion: no     Sore Throat: no     Cough: YES-productive of clear sputum    Wheeze: no     Decreased Appetite: no     Nausea: no     Vomiting: no     Diarrhea:  no     Dysuria/Freq.: no     Fatigue/Achiness: no     Sick/Strep Exposure: no      Therapies Tried and outcome: a z-pack, prednisone    Patient was a ppd smoker x 30 years.  She also has ashtma, using her advair but not using her albuterol as I instructed last week.  She didn't know why or that the recommendation came from me.      She has a chronic cough, clear/white sputum. No fevers.     /68   Pulse 73   Temp 98.9  F (37.2  C) (Tympanic)   Resp 18   Ht 1.607 m (5' 3.25\")   Wt 75.3 kg (166 lb)   LMP 08/15/2006   SpO2 95%   BMI 29.17 kg/m    EXAM: GENERAL APPEARANCE: Alert, no acute distress  HENT: Ears and TMs normal, oral mucosa and posterior oropharynx normal  RESP: lungs clear to auscultation "   CV: normal rate, regular rhythm, no murmur or gallop  ABDOMEN: soft, no organomegaly, masses or tenderness      ASSESSMENT/PLAN:      ICD-10-CM    1. Moderate persistent asthma without complication J45.40    2. Chronic cough R05    use albuterol 3-4x/day and report back to me later this week on results of that.     Two options:   Increase advair dose  Add something:  spiriva or singulair (may favor spiriva given her smoking history).     May benefit from having PFTs done for follow up.     Karol Yousif M.D.      Patient Instructions         Thank you for choosing [facility].  You may be receiving a survey in the mail from [employer] regarding your visit today.  Please take a few minutes to complete and return the survey to let us know how we are doing.      Our Clinic hours are:  Mondays    7:20 am - 7 pm  Tues -  Fri  7:20 am - 5 pm    Clinic Phone: [phone]    The clinic lab opens at 7:30 am Mon - Fri and appointments are required.    [facility]. [phone]  Monday  8 am - 7pm  Tues - Fri 8 am - 5:30 pm

## 2019-01-09 ASSESSMENT — ASTHMA QUESTIONNAIRES: ACT_TOTALSCORE: 23

## 2019-01-11 ENCOUNTER — MYC MEDICAL ADVICE (OUTPATIENT)
Dept: FAMILY MEDICINE | Facility: CLINIC | Age: 54
End: 2019-01-11

## 2019-01-11 DIAGNOSIS — J20.9 ACUTE BRONCHITIS WITH SYMPTOMS > 10 DAYS: Primary | ICD-10-CM

## 2019-01-11 DIAGNOSIS — Z71.84 TRAVEL ADVICE ENCOUNTER: ICD-10-CM

## 2019-01-11 RX ORDER — AZITHROMYCIN 250 MG/1
TABLET, FILM COATED ORAL
Qty: 6 TABLET | Refills: 0 | Status: SHIPPED | OUTPATIENT
Start: 2019-01-11 | End: 2019-06-07

## 2019-01-11 NOTE — TELEPHONE ENCOUNTER
Please see [employer] message. Patient was seen on 1/7/19 wanted an update.  Patient has not had any Hep A vaccine in Epic or [facility].      Thank you    Henna MILLRE RN

## 2019-05-22 DIAGNOSIS — Z12.31 VISIT FOR SCREENING MAMMOGRAM: ICD-10-CM

## 2019-05-22 PROCEDURE — 77067 SCR MAMMO BI INCL CAD: CPT | Mod: TC

## 2019-06-05 ASSESSMENT — ENCOUNTER SYMPTOMS
EYE PAIN: 0
ABDOMINAL PAIN: 0
BREAST MASS: 0
CHILLS: 0
DIARRHEA: 0
WEAKNESS: 0
HEMATURIA: 0
DYSURIA: 0
MYALGIAS: 1
ARTHRALGIAS: 1
HEMATOCHEZIA: 0
HEADACHES: 0
JOINT SWELLING: 0
NAUSEA: 0
HEARTBURN: 0
COUGH: 0
NERVOUS/ANXIOUS: 0
SHORTNESS OF BREATH: 0
PARESTHESIAS: 0
DIZZINESS: 0
FEVER: 0
CONSTIPATION: 0
SORE THROAT: 0
PALPITATIONS: 0
FREQUENCY: 0

## 2019-06-06 ASSESSMENT — ENCOUNTER SYMPTOMS
MYALGIAS: 1
EYE PAIN: 0
DIZZINESS: 0
FEVER: 0
FREQUENCY: 0
CONSTIPATION: 0
DYSURIA: 0
COUGH: 0
SORE THROAT: 0
ABDOMINAL PAIN: 0
HEMATURIA: 0
HEADACHES: 0
CHILLS: 0
SHORTNESS OF BREATH: 0
PARESTHESIAS: 0
HEMATOCHEZIA: 0
NAUSEA: 0
BREAST MASS: 0
DIARRHEA: 0
HEARTBURN: 0
JOINT SWELLING: 0
PALPITATIONS: 0
ARTHRALGIAS: 1
NERVOUS/ANXIOUS: 0
WEAKNESS: 0

## 2019-06-06 NOTE — PATIENT INSTRUCTIONS
Our Clinic hours are:  Mondays    7:20 am - 7 pm  Tues - Fri  7:20 am - 5 pm    Clinic Phone: [phone]    The clinic lab opens at 7:30 am Mon - Fri and appointments are required.    [facility]. [phone]  Monday  8 am - 7pm  Tues - Fri 8 am - 5:30 pm         Preventive Health Recommendations  Female Ages 50 - 64    Yearly exam: See your health care provider every year in order to  o Review health changes.   o Discuss preventive care.    o Review your medicines if your doctor has prescribed any.      Get a Pap test every three years (unless you have an abnormal result and your provider advises testing more often).    If you get Pap tests with HPV test, you only need to test every 5 years, unless you have an abnormal result.     You do not need a Pap test if your uterus was removed (hysterectomy) and you have not had cancer.    You should be tested each year for STDs (sexually transmitted diseases) if you're at risk.     Have a mammogram every 1 to 2 years.    Have a colonoscopy at age 50, or have a yearly FIT test (stool test). These exams screen for colon cancer.      Have a cholesterol test every 5 years, or more often if advised.    Have a diabetes test (fasting glucose) every three years. If you are at risk for diabetes, you should have this test more often.     If you are at risk for osteoporosis (brittle bone disease), think about having a bone density scan (DEXA).    Shots: Get a flu shot each year. Get a tetanus shot every 10 years.    Nutrition:     Eat at least 5 servings of fruits and vegetables each day.    Eat whole-grain bread, whole-wheat pasta and brown rice instead of white grains and rice.    Get adequate Calcium and Vitamin D.     Lifestyle    Exercise at least 150 minutes a week (30 minutes a day, 5 days a week). This will help you control your weight and prevent disease.    Limit alcohol to one drink per day.    No smoking.     Wear sunscreen to prevent skin  cancer.     See your dentist every six months for an exam and cleaning.    See your eye doctor every 1 to 2 years.

## 2019-06-06 NOTE — PROGRESS NOTES
"   SUBJECTIVE:   CC: Francesca Rivera is an 53 year old woman who presents for preventive health visit.     Healthy Habits:     Getting at least 3 servings of Calcium per day:  Yes    Bi-annual eye exam:  Yes    Dental care twice a year:  Yes    Sleep apnea or symptoms of sleep apnea:  None    Diet:  Regular (no restrictions)    Frequency of exercise:  2-3 days/week    Duration of exercise:  15-30 minutes    Taking medications regularly:  Yes    Medication side effects:  Not applicable    PHQ-2 Total Score: 0    Additional concerns today:  Yes      Leg weakness       Duration: 30 years    Description (location/character/radiation):  Patient describes a condition she has had intermittently x 30 years \"since kids were born\" where she gets weakness in her legs if she climbs stairs (no effect on descent of stairs).  She will also have this, as she did this spring in April, after doing a lot of work - was doing yard work and pulling trees, etc.     She had \"a lot of evaluations\" over the years and no cause has been found. She has not had imaging of her low back.  Sometimes it will feel that her legs will not support her weight.  She will often be laid up for a few days to a week after this and then get better.  In April her legs felt weak and mildly painful, but she wasn't completely laid up.     She has no bowel/bladder symptoms    No severe pain but both legs are affected.     She has spent 30 years \"avoiding stairs\".  Other activities such as walking doesn't cause this.   Can walk on flat ground and have no problems.     Intensity:  moderate    Accompanying signs and symptoms:  See above    History (similar episodes/previous evaluation): yes    Precipitating or alleviating factors: climbing stairs    Therapies tried and outcome: None       Today's PHQ-2 Score:   PHQ-2 ( 1999 [employer]) 6/5/2019   Q1: Little interest or pleasure in doing things 0   Q2: Feeling down, depressed or hopeless 0   PHQ-2 Score 0   Q1: Little " interest or pleasure in doing things Not at all   Q2: Feeling down, depressed or hopeless Not at all   PHQ-2 Score 0       Abuse: Current or Past(Physical, Sexual or Emotional)- No  Do you feel safe in your environment? Yes    Social History     Tobacco Use     Smoking status: Former Smoker     Packs/day: 1.00     Years: 28.00     Pack years: 28.00     Types: Cigarettes     Last attempt to quit: 2010     Years since quittin.0     Smokeless tobacco: Never Used   Substance Use Topics     Alcohol use: Yes     Comment: occ     If you drink alcohol do you typically have >3 drinks per day or >7 drinks per week? No    Alcohol Use 2019   Prescreen: >3 drinks/day or >7 drinks/week? No   Prescreen: >3 drinks/day or >7 drinks/week? -   No flowsheet data found.    Reviewed orders with patient.  Reviewed health maintenance and updated orders accordingly - Yes  Lab work is in process    Mammogram Screening: Patient over age 50, mutual decision to screen reflected in health maintenance.    Pertinent mammograms are reviewed under the imaging tab.  History of abnormal Pap smear: NO - age 30-65 PAP every 5 years with negative HPV co-testing recommended  PAP / HPV Latest Ref Rng & Units 2015 3/20/2012 2009   PAP - NIL NIL NIL   HPV 16 DNA NEG Negative - -   HPV 18 DNA NEG Negative - -   OTHER HR HPV NEG Negative - -     Reviewed and updated as needed this visit by clinical staff  Tobacco  Allergies  Meds  Problems  Med Hx  Surg Hx  Fam Hx         Reviewed and updated as needed this visit by Provider  Meds            Review of Systems   Constitutional: Negative for chills and fever.   HENT: Negative for congestion, ear pain, hearing loss and sore throat.    Eyes: Negative for pain and visual disturbance.   Respiratory: Negative for cough and shortness of breath.    Cardiovascular: Negative for chest pain, palpitations and peripheral edema.   Gastrointestinal: Negative for abdominal pain, constipation,  "diarrhea, heartburn, hematochezia and nausea.   Breasts:  Negative for tenderness, breast mass and discharge.   Genitourinary: Negative for dysuria, frequency, genital sores, hematuria, pelvic pain, urgency, vaginal bleeding and vaginal discharge.   Musculoskeletal: Positive for arthralgias and myalgias. Negative for joint swelling.   Skin: Positive for rash.   Neurological: Negative for dizziness, weakness, headaches and paresthesias.   Psychiatric/Behavioral: Negative for mood changes. The patient is not nervous/anxious.      CONSTITUTIONAL: NEGATIVE for fever, chills, change in weight  INTEGUMENTARY/SKIN: NEGATIVE for worrisome rashes, moles or lesions  EYES: NEGATIVE for vision changes or irritation  ENT: NEGATIVE for ear, mouth and throat problems  RESP: NEGATIVE for significant cough or SOB  BREAST: NEGATIVE for masses, tenderness or discharge  CV: NEGATIVE for chest pain, palpitations or peripheral edema  GI: NEGATIVE for nausea, abdominal pain, heartburn, or change in bowel habits  : NEGATIVE for unusual urinary or vaginal symptoms. No vaginal bleeding.  NEURO: see above  PSYCHIATRIC: NEGATIVE for changes in mood or affect      OBJECTIVE:   /78   Pulse 77   Temp 97.9  F (36.6  C) (Tympanic)   Resp 18   Ht 1.607 m (5' 3.25\")   Wt 75.3 kg (166 lb)   LMP 08/15/2006   SpO2 99%   BMI 29.17 kg/m    Physical Exam  GENERAL: healthy, alert and no distress  EYES: Eyes grossly normal to inspection, PERRL and conjunctivae and sclerae normal  HENT: ear canals and TM's normal, nose and mouth without ulcers or lesions  NECK: no adenopathy, no asymmetry, masses, or scars and thyroid normal to palpation  RESP: lungs clear to auscultation - no rales, rhonchi or wheezes  BREAST: normal without masses, tenderness or nipple discharge and no palpable axillary masses or adenopathy  CV: regular rate and rhythm, normal S1 S2, no S3 or S4, no murmur, click or rub, no peripheral edema and peripheral pulses " strong  ABDOMEN: soft, nontender, no hepatosplenomegaly, no masses and bowel sounds normal   (female): normal urethral meatus , vaginal mucosa pink, moist, well rugated, normal cervix, adnexae, and uterus without masses. and perineal skin and periclitoral tissue changes c/w lichen sclerosis.  Pap obtained.   MS: no gross musculoskeletal defects noted, no edema  SKIN: no suspicious lesions or rashes  NEURO: Normal strength and tone, mentation intact and speech normal  PSYCH: mentation appears normal, affect normal/bright    Diagnostic Test Results:  Labs reviewed in Epic    ASSESSMENT/PLAN:   1. Routine general medical examination at a health care facility     - Hemoglobin A1c    2. Complaints of weakness of lower extremity  Etiology unclear.  By history, there is concern that this could be neurological or neuromuscular.  Will start with imaging of the spine to r/o disc, tethered cord(?), MS type plaques, etc.   If negative, consider myesthenia work up or evaluation with neurology/neuromuscular specialist.   - MR Lumbar Spine w/o Contrast; Future    3. CARDIOVASCULAR SCREENING; LDL GOAL LESS THAN 160       4. Moderate persistent asthma without complication   well controlled  - albuterol (VENTOLIN HFA) 108 (90 Base) MCG/ACT inhaler; Inhale 2 puffs into the lungs every 4 hours as needed for shortness of breath / dyspnea or wheezing FILL ALBUTEROL BRAND/GENERIC AS COVERED  Dispense: 18 Inhaler; Refill: 2  - fluticasone-salmeterol (ADVAIR DISKUS) 250-50 MCG/DOSE inhaler; Inhale 1 puff into the lungs 2 times daily  Dispense: 3 Inhaler; Refill: 3    5. Lichen sclerosis et atrophicus     - clobetasol (TEMOVATE) 0.05 % external ointment; Apply  topically. Apply small amount twice weekly as needed for maintenance. May use daily for 1-2 weeks for exacerbation.  Dispense: 45 g; Refill: 3    6. Post-menopausal atrophic vaginitis   this is working well  - estradiol (ESTRACE) 0.5 MG tablet; 0.5 mg tablet inserted vaginally twice  "a week  Dispense: 24 tablet; Refill: 3    7. Other allergic rhinitis     - fluticasone (FLONASE) 50 MCG/ACT nasal spray; Spray 2 sprays into both nostrils daily  Dispense: 48 g; Refill: 3    8. Gastroesophageal reflux disease, esophagitis presence not specified     - omeprazole (PRILOSEC) 20 MG DR capsule; Take 1 capsule (20 mg) by mouth daily  Dispense: 90 capsule; Refill: 3    9. Screening for cervical cancer     - Pap imaged thin layer screen with HPV - recommended age 30 - 65 years (select HPV order below)  - HPV High Risk Types DNA Cervical    COUNSELING:  Reviewed preventive health counseling, as reflected in patient instructions       Regular exercise       Healthy diet/nutrition    Estimated body mass index is 29.17 kg/m  as calculated from the following:    Height as of this encounter: 1.607 m (5' 3.25\").    Weight as of this encounter: 75.3 kg (166 lb).    Weight management plan: Discussed healthy diet and exercise guidelines     reports that she quit smoking about 9 years ago. Her smoking use included cigarettes. She has a 28.00 pack-year smoking history. She has never used smokeless tobacco.      Counseling Resources:  ATP IV Guidelines  Pooled Cohorts Equation Calculator  Breast Cancer Risk Calculator  FRAX Risk Assessment  ICSI Preventive Guidelines  Dietary Guidelines for Americans, 2010  USDA's MyPlate  ASA Prophylaxis  Lung CA Screening    Karol Yousif MD  [facility]  "

## 2019-06-07 ENCOUNTER — OFFICE VISIT (OUTPATIENT)
Dept: FAMILY MEDICINE | Facility: CLINIC | Age: 54
End: 2019-06-07
Payer: COMMERCIAL

## 2019-06-07 ENCOUNTER — RESULT FOLLOW UP (OUTPATIENT)
Dept: FAMILY MEDICINE | Facility: CLINIC | Age: 54
End: 2019-06-07

## 2019-06-07 VITALS
WEIGHT: 166 LBS | TEMPERATURE: 97.9 F | BODY MASS INDEX: 29.41 KG/M2 | OXYGEN SATURATION: 99 % | SYSTOLIC BLOOD PRESSURE: 118 MMHG | HEART RATE: 77 BPM | DIASTOLIC BLOOD PRESSURE: 78 MMHG | RESPIRATION RATE: 18 BRPM | HEIGHT: 63 IN

## 2019-06-07 DIAGNOSIS — R29.898 COMPLAINTS OF WEAKNESS OF LOWER EXTREMITY: ICD-10-CM

## 2019-06-07 DIAGNOSIS — N95.2 POST-MENOPAUSAL ATROPHIC VAGINITIS: ICD-10-CM

## 2019-06-07 DIAGNOSIS — Z13.6 CARDIOVASCULAR SCREENING; LDL GOAL LESS THAN 160: ICD-10-CM

## 2019-06-07 DIAGNOSIS — J45.40 MODERATE PERSISTENT ASTHMA WITHOUT COMPLICATION: ICD-10-CM

## 2019-06-07 DIAGNOSIS — L94.0 CIRCUMSCRIBED SCLERODERMA: ICD-10-CM

## 2019-06-07 DIAGNOSIS — J30.89 OTHER ALLERGIC RHINITIS: ICD-10-CM

## 2019-06-07 DIAGNOSIS — Z00.00 ROUTINE GENERAL MEDICAL EXAMINATION AT A HEALTH CARE FACILITY: Primary | ICD-10-CM

## 2019-06-07 DIAGNOSIS — K21.9 GASTROESOPHAGEAL REFLUX DISEASE, ESOPHAGITIS PRESENCE NOT SPECIFIED: ICD-10-CM

## 2019-06-07 DIAGNOSIS — Z12.4 SCREENING FOR CERVICAL CANCER: ICD-10-CM

## 2019-06-07 LAB — HBA1C MFR BLD: 5.3 % (ref 0–5.6)

## 2019-06-07 PROCEDURE — G0145 SCR C/V CYTO,THINLAYER,RESCR: HCPCS | Performed by: FAMILY MEDICINE

## 2019-06-07 PROCEDURE — 83036 HEMOGLOBIN GLYCOSYLATED A1C: CPT | Performed by: FAMILY MEDICINE

## 2019-06-07 PROCEDURE — 87624 HPV HI-RISK TYP POOLED RSLT: CPT | Performed by: FAMILY MEDICINE

## 2019-06-07 PROCEDURE — 36415 COLL VENOUS BLD VENIPUNCTURE: CPT | Performed by: FAMILY MEDICINE

## 2019-06-07 PROCEDURE — 99396 PREV VISIT EST AGE 40-64: CPT | Performed by: FAMILY MEDICINE

## 2019-06-07 PROCEDURE — 99214 OFFICE O/P EST MOD 30 MIN: CPT | Mod: 25 | Performed by: FAMILY MEDICINE

## 2019-06-07 RX ORDER — ESTRADIOL 0.5 MG/1
TABLET ORAL
Qty: 24 TABLET | Refills: 3 | Status: SHIPPED | OUTPATIENT
Start: 2019-06-07 | End: 2020-07-03

## 2019-06-07 RX ORDER — FLUTICASONE PROPIONATE 50 MCG
2 SPRAY, SUSPENSION (ML) NASAL DAILY
Qty: 48 G | Refills: 3 | Status: SHIPPED | OUTPATIENT
Start: 2019-06-07 | End: 2020-06-12

## 2019-06-07 RX ORDER — BETAMETHASONE DIPROPIONATE 0.5 MG/G
CREAM TOPICAL
Qty: 30 G | Refills: 3 | Status: CANCELLED | OUTPATIENT
Start: 2019-06-10

## 2019-06-07 RX ORDER — ALBUTEROL SULFATE 90 UG/1
2 AEROSOL, METERED RESPIRATORY (INHALATION) EVERY 4 HOURS PRN
Qty: 18 INHALER | Refills: 2 | Status: SHIPPED | OUTPATIENT
Start: 2019-06-07 | End: 2020-07-03

## 2019-06-07 RX ORDER — CLOBETASOL PROPIONATE 0.5 MG/G
OINTMENT TOPICAL
Qty: 45 G | Refills: 3 | Status: SHIPPED | OUTPATIENT
Start: 2019-06-07 | End: 2020-09-02

## 2019-06-07 ASSESSMENT — MIFFLIN-ST. JEOR: SCORE: 1336.06

## 2019-06-07 ASSESSMENT — PAIN SCALES - GENERAL: PAINLEVEL: NO PAIN (0)

## 2019-06-08 ASSESSMENT — ASTHMA QUESTIONNAIRES: ACT_TOTALSCORE: 25

## 2019-06-10 ENCOUNTER — HOSPITAL ENCOUNTER (OUTPATIENT)
Dept: MRI IMAGING | Facility: CLINIC | Age: 54
Discharge: HOME | End: 2019-06-10
Attending: FAMILY MEDICINE | Admitting: FAMILY MEDICINE
Payer: COMMERCIAL

## 2019-06-10 DIAGNOSIS — R29.898 COMPLAINTS OF WEAKNESS OF LOWER EXTREMITY: ICD-10-CM

## 2019-06-10 PROCEDURE — 72148 MRI LUMBAR SPINE W/O DYE: CPT

## 2019-06-11 LAB
COPATH REPORT: NORMAL
PAP: NORMAL

## 2019-06-12 NOTE — RESULT ENCOUNTER NOTE
Jessica - MRI shows a small disc protrusion at L5-S1.  Doesn't look like it's causing significant pressure on the nerve.  I doubt this is what is causing your weakness in your legs.  May be helpful to see you if you're having symptoms.  We may need to look into other causes, but nothing in the MRI to suggest a mass of the lumbar spine or any plaques to suggest MS, etc.    Overall good news.  Many people have disc displacement like shown here and have no symptoms.  Karol Yousif M.D.

## 2019-06-13 PROBLEM — R87.810 CERVICAL HIGH RISK HPV (HUMAN PAPILLOMAVIRUS) TEST POSITIVE: Status: ACTIVE | Noted: 2019-06-07

## 2019-06-13 LAB
FINAL DIAGNOSIS: ABNORMAL
HPV HR 12 DNA CVX QL NAA+PROBE: POSITIVE
HPV16 DNA SPEC QL NAA+PROBE: NEGATIVE
HPV18 DNA SPEC QL NAA+PROBE: NEGATIVE
SPECIMEN DESCRIPTION: ABNORMAL
SPECIMEN SOURCE CVX/VAG CYTO: ABNORMAL

## 2019-06-13 NOTE — PROGRESS NOTES
2003, 2004, 2005, 2006, 2007, 2009, 2012 NIL paps.  2015 NIL pap, Neg HPV.   6/7/19 NIL pap, + HR HPV (not 16 or 18). Plan cotest in 1 year. (MRA)  6/14/19 Attempted to call pt with result. Her VM was full. Will attempt again at a later time. (MRA)  6/17/19 Mailbox is full. Unable to leave a message. Result released to [employer]. (vignesh)  6/24/19 Pt viewed result on [employer] (vignesh)  9/14/20 CCT tracking. (MRA)

## 2019-10-04 ENCOUNTER — OFFICE VISIT (OUTPATIENT)
Dept: FAMILY MEDICINE | Facility: CLINIC | Age: 54
End: 2019-10-04
Payer: COMMERCIAL

## 2019-10-04 VITALS
HEIGHT: 63 IN | HEART RATE: 78 BPM | BODY MASS INDEX: 30.3 KG/M2 | OXYGEN SATURATION: 100 % | SYSTOLIC BLOOD PRESSURE: 112 MMHG | RESPIRATION RATE: 18 BRPM | TEMPERATURE: 96.8 F | DIASTOLIC BLOOD PRESSURE: 62 MMHG | WEIGHT: 171 LBS

## 2019-10-04 DIAGNOSIS — M77.12 LATERAL EPICONDYLITIS OF LEFT ELBOW: ICD-10-CM

## 2019-10-04 DIAGNOSIS — Z87.898 H/O MOTION SICKNESS: Primary | ICD-10-CM

## 2019-10-04 PROCEDURE — 99214 OFFICE O/P EST MOD 30 MIN: CPT | Performed by: FAMILY MEDICINE

## 2019-10-04 RX ORDER — NAPROXEN 500 MG/1
500 TABLET ORAL 2 TIMES DAILY WITH MEALS
Qty: 30 TABLET | Refills: 0 | Status: SHIPPED | OUTPATIENT
Start: 2019-10-04 | End: 2020-07-03

## 2019-10-04 RX ORDER — SCOLOPAMINE TRANSDERMAL SYSTEM 1 MG/1
1 PATCH, EXTENDED RELEASE TRANSDERMAL
Qty: 4 PATCH | Refills: 0 | Status: SHIPPED | OUTPATIENT
Start: 2019-10-04 | End: 2020-07-03

## 2019-10-04 RX ORDER — NAPROXEN 500 MG/1
500 TABLET ORAL 2 TIMES DAILY WITH MEALS
Qty: 30 TABLET | Refills: 0 | Status: SHIPPED | OUTPATIENT
Start: 2019-10-04 | End: 2019-10-04

## 2019-10-04 ASSESSMENT — PAIN SCALES - GENERAL: PAINLEVEL: MODERATE PAIN (5)

## 2019-10-04 ASSESSMENT — MIFFLIN-ST. JEOR: SCORE: 1353.74

## 2019-10-04 NOTE — PATIENT INSTRUCTIONS
Our Clinic hours are:  Mondays    7:20 am - 7 pm  Tues - Fri  7:20 am - 5 pm    Clinic Phone: [phone]    The clinic lab opens at 7:30 am Mon - Fri and appointments are required.    [facility]. [phone]  Monday  8 am - 7pm  Tues - Fri 8 am - 5:30 pm         Patient Education     Understanding Lateral Epicondylitis    Tendons are strong bands of tissue that connect muscles to bones. Lateral epicondylitis affects the tendons that connect muscles in the forearm to the lateral epicondyle. This is the bony knob on the outer side of the elbow. The condition occurs if the extensor tendons of the wrist become red and swollen (irritated). This can cause pain in the elbow, forearm, and wrist. Because the condition is sometimes caused by playing tennis, it is also known as  tennis elbow.   How to say it  [id number]   What causes lateral epicondylitis?  The condition most often occurs because of overuse. This can be from any activity that repeatedly puts stress on the forearm extensor muscles or tendons and wrist. For instance, playing tennis, lifting weights, cutting meat, painting, and typing can all cause the condition. Wear and tear of the tendons from aging or an injury to the tendons can also cause the condition.  Symptoms of lateral epicondylitis  The most common symptom is pain. You may feel it on the outer side of the elbow and down the back of the forearm. It may be worse when moving or using the elbow, forearm, or wrist. You may also feel pain when gripping or lifting things.  Treatment for lateral epicondylitis  Treatments may include:    Resting the elbow, forearm, and wrist. You ll need to avoid movements that can make your symptoms worse. You also may need to avoid certain sports and types of work for a time. This helps relieve symptoms and prevent further damage to the tendons.    Changing the action that caused the problem. For instance, if the  tendons were damaged from playing tennis, it may help to change your playing technique or use different equipment. This helps prevent further damage to the tendons.    Using cold packs. Putting an ice pack on the injured area can help reduce pain and swelling.    Taking pain medicines. Taking prescription or over-the-counter pain medicines may help reduce pain and swelling.      Wearing a brace. This helps reduce strain on the muscles and tendons in the forearm, which may relieve symptoms. It is very important to wear the brace properly.    Doing exercises and physical therapy. These help improve strength and range of motion in the elbow, forearm, and wrist.    Getting shots of medicine into the injured area. These may help relieve symptoms for a time.    Having surgery. This may be an option if other treatments fail to relieve symptoms. In many cases, the surgeon removes the damaged tissue.  Possible complications of lateral epicondylitis  If the tendons involved don t heal properly, symptoms may return or get worse. To help prevent this, follow your treatment plan as directed.  When to call your healthcare provider  Call your healthcare provider right away if you have any of these:    Fever of 100.4 F (38 C) or higher, or as directed    Redness, swelling, or warmth in the elbow or forearm that gets worse    Symptoms that don t get better with treatment, or get worse    New symptoms   Date Last Reviewed: 3/10/2016    [id number] [employer]. [address] [place] [address], [place] [address]. All rights reserved. This information is not intended as a substitute for professional medical care. Always follow your healthcare professional's instructions.           Patient Education     Treating Tennis Elbow    Your treatment will depend on how inflamed your tendon is. The goal is to relieve your symptoms and help you regain full use of your elbow.  Rest and medicine  Wearing a tennis elbow splint allows the inflamed  tendon to rest. It must be worn properly. It should be placed down the arm past the painful area of the elbow. If it is directly over the inflamed tendon, it can worsen the symptoms. This brace can help the tendon heal. Using your other hand or changing your  also takes stress off the tendon. Oral nonsteroidal anti-inflammatory medicines (NSAIDs) and ice can relieve pain and reduce swelling.  Exercises and therapy  Your healthcare provider may give you an exercise program. He or she may refer you to a physical therapist. The physical therapist will teach you how to gently stretch and strengthen the muscles around your elbow.  Anti-inflammatory injections  Your healthcare provider may give you injections of an anti-inflammatory medicine, such as cortisone. This helps reduce swelling. You may have more pain at first. But in a few days, your elbow should feel better.  If surgery is needed  If your symptoms persist for a long time, or other treatments don t work, your healthcare provider may recommend surgery. Surgery repairs the inflamed tendon.  Date Last Reviewed: 1/1/2018 2000-2018 [employer], [employer]. [address] [place] [address], [place] [address]. All rights reserved. This information is not intended as a substitute for professional medical care. Always follow your healthcare professional's instructions.

## 2019-10-04 NOTE — PROGRESS NOTES
"Subjective     Francesca Rivera is a 54 year old female who presents to clinic today for the following health issues:    HPI   Chief Complaint   Patient presents with     Musculoskeletal Problem     Patient noticed pain in left mis arm and hand that started in June. Patient has not tried anything for discomfort. Patient most discomfort is when picking up objects. Patient has no swelling. Patient has no known injury.      Patient Request     Patient needs motion sick patches for upcoming cruise.      Joint Pain    Onset: 3 months ago    Description:   Location: left forearm  Character: Dull ache    Intensity: moderate    Progression of Symptoms: same    Accompanying Signs & Symptoms:  Other symptoms: none    History:   Previous similar pain: no       Precipitating factors:   Trauma or overuse: no     Alleviating factors:  Improved by: nothing - has not tried icing or NSAIDS    Therapies Tried and outcome:               Reviewed and updated as needed this visit by Provider         Review of Systems   ROS COMP: Constitutional, HEENT, cardiovascular, pulmonary, gi and gu systems are negative, except as otherwise noted.      Objective    /62   Pulse 78   Temp 96.8  F (36  C) (Tympanic)   Resp 18   Ht 1.607 m (5' 3.25\")   Wt 77.6 kg (171 lb)   LMP 08/15/2006   SpO2 100%   Breastfeeding? No   BMI 30.05 kg/m    Body mass index is 30.05 kg/m .  Physical Exam   ELBOWS: Tender over lateral epicondyle left side(s).  Resisted external rotation and extension of the wrist and fingers reproduces pain along forearm and into hand.    Normal elbow range of motion.   Normal strength.               Assessment & Plan     1. H/O motion sickness  Going on a 16 day cruise  Discussed proper use, side effects, has used before  - scopolamine (TRANSDERM) 1 MG/3DAYS 72 hr patch; Place 1 patch onto the skin every 72 hours  Dispense: 4 patch; Refill: 0    2. Lateral epicondylitis of left elbow    Discussed the causes and treatment " of lateral epicondylitis including ice, heat, stretching, massage, NSAIDs, elbow straps, and wrist brace use.  Rest as possible.   If not improving over the next 3-4 weeks, call for referral to hand therapy.      - naproxen (NAPROSYN) 500 MG tablet; Take 1 tablet (500 mg) by mouth 2 times daily (with meals)  Dispense: 30 tablet; Refill: 0              Return in about 3 weeks (around 10/25/2019) for if not better - may call.    Karol Yousif MD  [facility]

## 2019-10-05 ASSESSMENT — ASTHMA QUESTIONNAIRES: ACT_TOTALSCORE: 25

## 2019-10-07 ENCOUNTER — MYC MEDICAL ADVICE (OUTPATIENT)
Dept: FAMILY MEDICINE | Facility: CLINIC | Age: 54
End: 2019-10-07

## 2019-10-07 NOTE — TELEPHONE ENCOUNTER
Yes, can take acetaminophen/tylenol as it's in a different class than NSAIDS (ibuprofen/naproxen).  Karol Yousif M.D.

## 2019-10-28 DIAGNOSIS — M77.12 LATERAL EPICONDYLITIS, LEFT ELBOW: Primary | ICD-10-CM

## 2019-11-14 ENCOUNTER — HOSPITAL ENCOUNTER (OUTPATIENT)
Dept: OCCUPATIONAL THERAPY | Facility: CLINIC | Age: 54
Setting detail: THERAPIES SERIES
End: 2019-11-14
Attending: FAMILY MEDICINE
Payer: COMMERCIAL

## 2019-11-14 DIAGNOSIS — M77.12 LATERAL EPICONDYLITIS, LEFT ELBOW: ICD-10-CM

## 2019-11-14 PROCEDURE — 97165 OT EVAL LOW COMPLEX 30 MIN: CPT | Mod: GO | Performed by: OCCUPATIONAL THERAPIST

## 2019-11-14 PROCEDURE — 97035 APP MDLTY 1+ULTRASOUND EA 15: CPT | Mod: GO | Performed by: OCCUPATIONAL THERAPIST

## 2019-11-14 PROCEDURE — 97110 THERAPEUTIC EXERCISES: CPT | Mod: GO | Performed by: OCCUPATIONAL THERAPIST

## 2019-11-14 PROCEDURE — 97140 MANUAL THERAPY 1/> REGIONS: CPT | Mod: GO | Performed by: OCCUPATIONAL THERAPIST

## 2019-11-14 NOTE — PROGRESS NOTES
11/14/19   Hand Therapy Evaluation   General Information/History   Start Of Care Date 11/14/19   Referring Physician Karol Yousif   Orders Evaluate And Treat As Indicated   Orders Date 10/28/19   Medical Diagnosis Left Lateral Epicondylitis   Additional Occupational Profile Info/Pertinent history of current problem Patient relates she started having left hand pain in June back of hand that goes up into dorsal forearm.   Can't think of anything that she could have done at the time.    Previous treatment or current condition has been wearing a counter force brace since the beginning of October   Past medical history OA,history of fractures   How/Where did it occur From insidious onset;At home   Onset date of current episode/exacerbation 06/14/19   Chronicity New   Hand Dominance Right   Affected side Left   Functional limitations perform activities of daily living;perform required work activities;perform desired leisure / sports activities   Prior level of function Independent ADL;Independent IADL   Important Activities going to concerts, games   Patient role/Employment history Employed   Occupation    Employment Status Working in normal job without restrictions   Primary Job Tasks Keyboarding   Occupation Comments goes to a lot of meetings   Patient/Family goals statement Patient would like to be able to lift things without pain.   Fall Risk Screen   Fall screen completed by OT   Have you fallen 2 or more times in the past year? No   Have you fallen and had an injury in the past year? No   Is patient a fall risk? No   Abuse Screen (yes response referral indicated)   Feels Unsafe at Home or Work/School no   Feels Threatened by Someone no   Does Anyone Try to Keep You From Having Contact with Others or Doing Things Outside Your Home? no   Physical Signs of Abuse Present no   Pain   Pain Primary Pain Report   Primary Pain Report   Location dorsal hand and forearm   Pain Quality Sharp;Shooting   Frequency  Intermittent  (only with activity)   Scale 0/10;7/10   Pain Is Same All Of The Time   Pain Is Exacerbated By Lifting;Carrying;Gripping   Pain Is Relieved By Rest   Progression Since Onset Gradually Improving   Edema   Edema Elbow Crease   Elbow Crease (measured in cm)   Elbow Crease -  - Left 25.5   Elbow Crease -  - Right 26   Palpation   Palpation Assessed   Right Hand Palpation Comments no pain with palpation to lateral epi or PIN   ROM   ROM AROM   AROM   Comments Increased pain with end range motion in elbow flexion and extension - all U?E ROM is WNL   Sensation Findings   Sensation Findings Other (see comments)   Sensation Comments No sensory complaints   Strength   Strength Other (see comments);Strength    Avg - Left 42 3/10 pain    Avg - Right 44    Elbow Extended Avg - Left 6 6/10 pain    Elbow Extended Avg - Right 33   Strength Comments Increased pain at PIN with resisted wrist extension  7/10, mild with resistance to finger extensors, increased pain with resistance to pronation   Education Assessment   Preferred Learning Style Reading;Listening;Demonstration;Pictures/video   Barriers to Learning No barriers   Therapy Interventions   Planned Therapy Interventions Ultrasound;Light Therapy;Manual Therapy;ROM;Strengthening;Edema Management;Education of splint wear, care, fit and precautions;Self Care/Home Management;Ergonomic Patient Education;Home Program   Therapy plan comments To be added as appropriate   Clinical Impression   Criteria for Skilled Therapeutic Interventions Met yes;treatment indicated   OT Diagnosis decreased ADL's IADL's due to pain   Influenced by the following impairments Pain;Decreased strength   Assessment of Occupational Performance 1-3 Performance Deficits   Identified Performance Deficits carrying and lifting things   Clinical Decision Making (Complexity) Low complexity   Therapy Frequency 2x/week   Predicted Duration of Therapy Intervention (days/wks) 6 weeks    Risks and Benefits of Treatment have been explained. Yes   Patient, Family & other staff in agreement with plan of care Yes   Clinical Impression Comments suspect PIN compression   Hand Eval Goals   Hand Eval Goals 1;2   Hand Goal 1   Goal Identifier carrying   Goal Description patient to be able to carry cup of coffee in left hand with pain less than 1/10   Target Date 12/14/19   Hand Goal 2   Goal Identifier reach and lift   Goal Description patient to be able to reach and lift up a small shopping bag with  pain less than 3/10   Target Date 12/27/19   Total Evaluation Time   Ernestina Kelly OTR/L CHT  Occupational Therapist, Certified Hand Therapist

## 2019-11-20 ENCOUNTER — HOSPITAL ENCOUNTER (OUTPATIENT)
Dept: OCCUPATIONAL THERAPY | Facility: CLINIC | Age: 54
Setting detail: THERAPIES SERIES
End: 2019-11-20
Attending: FAMILY MEDICINE
Payer: COMMERCIAL

## 2019-11-20 PROCEDURE — 97035 APP MDLTY 1+ULTRASOUND EA 15: CPT | Mod: GO | Performed by: OCCUPATIONAL THERAPIST

## 2019-11-20 PROCEDURE — 97140 MANUAL THERAPY 1/> REGIONS: CPT | Mod: GO | Performed by: OCCUPATIONAL THERAPIST

## 2019-11-20 PROCEDURE — 97110 THERAPEUTIC EXERCISES: CPT | Mod: GO | Performed by: OCCUPATIONAL THERAPIST

## 2019-11-25 ENCOUNTER — HOSPITAL ENCOUNTER (OUTPATIENT)
Dept: OCCUPATIONAL THERAPY | Facility: CLINIC | Age: 54
Setting detail: THERAPIES SERIES
End: 2019-11-25
Attending: FAMILY MEDICINE
Payer: COMMERCIAL

## 2019-11-25 PROCEDURE — 97035 APP MDLTY 1+ULTRASOUND EA 15: CPT | Mod: GO | Performed by: OCCUPATIONAL THERAPIST

## 2019-11-25 PROCEDURE — 97140 MANUAL THERAPY 1/> REGIONS: CPT | Mod: GO | Performed by: OCCUPATIONAL THERAPIST

## 2019-12-02 ENCOUNTER — HOSPITAL ENCOUNTER (OUTPATIENT)
Dept: OCCUPATIONAL THERAPY | Facility: CLINIC | Age: 54
Setting detail: THERAPIES SERIES
End: 2019-12-02
Attending: FAMILY MEDICINE
Payer: COMMERCIAL

## 2019-12-02 PROCEDURE — 97140 MANUAL THERAPY 1/> REGIONS: CPT | Mod: GO | Performed by: OCCUPATIONAL THERAPIST

## 2019-12-02 PROCEDURE — 97035 APP MDLTY 1+ULTRASOUND EA 15: CPT | Mod: GO | Performed by: OCCUPATIONAL THERAPIST

## 2019-12-05 ENCOUNTER — HOSPITAL ENCOUNTER (OUTPATIENT)
Dept: OCCUPATIONAL THERAPY | Facility: CLINIC | Age: 54
Setting detail: THERAPIES SERIES
End: 2019-12-05
Attending: FAMILY MEDICINE
Payer: COMMERCIAL

## 2019-12-05 PROCEDURE — 97035 APP MDLTY 1+ULTRASOUND EA 15: CPT | Mod: GO | Performed by: OCCUPATIONAL THERAPIST

## 2019-12-05 PROCEDURE — 97140 MANUAL THERAPY 1/> REGIONS: CPT | Mod: GO | Performed by: OCCUPATIONAL THERAPIST

## 2019-12-09 ENCOUNTER — HOSPITAL ENCOUNTER (OUTPATIENT)
Dept: OCCUPATIONAL THERAPY | Facility: CLINIC | Age: 54
Setting detail: THERAPIES SERIES
End: 2019-12-09
Attending: FAMILY MEDICINE
Payer: COMMERCIAL

## 2019-12-09 PROCEDURE — 97035 APP MDLTY 1+ULTRASOUND EA 15: CPT | Mod: GO | Performed by: OCCUPATIONAL THERAPIST

## 2019-12-09 PROCEDURE — 97140 MANUAL THERAPY 1/> REGIONS: CPT | Mod: GO | Performed by: OCCUPATIONAL THERAPIST

## 2019-12-12 ENCOUNTER — HOSPITAL ENCOUNTER (OUTPATIENT)
Dept: OCCUPATIONAL THERAPY | Facility: CLINIC | Age: 54
Setting detail: THERAPIES SERIES
End: 2019-12-12
Attending: FAMILY MEDICINE
Payer: COMMERCIAL

## 2019-12-12 PROCEDURE — 97035 APP MDLTY 1+ULTRASOUND EA 15: CPT | Mod: GO | Performed by: OCCUPATIONAL THERAPIST

## 2019-12-12 PROCEDURE — 97140 MANUAL THERAPY 1/> REGIONS: CPT | Mod: GO | Performed by: OCCUPATIONAL THERAPIST

## 2019-12-16 ENCOUNTER — HOSPITAL ENCOUNTER (OUTPATIENT)
Dept: OCCUPATIONAL THERAPY | Facility: CLINIC | Age: 54
Setting detail: THERAPIES SERIES
End: 2019-12-16
Attending: FAMILY MEDICINE
Payer: COMMERCIAL

## 2019-12-16 PROCEDURE — 97140 MANUAL THERAPY 1/> REGIONS: CPT | Mod: GO | Performed by: OCCUPATIONAL THERAPIST

## 2019-12-16 PROCEDURE — 97035 APP MDLTY 1+ULTRASOUND EA 15: CPT | Mod: GO | Performed by: OCCUPATIONAL THERAPIST

## 2019-12-19 ENCOUNTER — HOSPITAL ENCOUNTER (OUTPATIENT)
Dept: OCCUPATIONAL THERAPY | Facility: CLINIC | Age: 54
Setting detail: THERAPIES SERIES
End: 2019-12-19
Attending: FAMILY MEDICINE
Payer: COMMERCIAL

## 2019-12-19 PROCEDURE — 97140 MANUAL THERAPY 1/> REGIONS: CPT | Mod: GO | Performed by: OCCUPATIONAL THERAPIST

## 2019-12-19 PROCEDURE — 97035 APP MDLTY 1+ULTRASOUND EA 15: CPT | Mod: GO | Performed by: OCCUPATIONAL THERAPIST

## 2019-12-19 NOTE — PROGRESS NOTES
12/19/19   Hand Therapy Discharge   Signing Clinician's Name / Credentials   Signing clinician's name / credentials Ernestina Kelly OTR/L, CHT   Session Number   Session Number 9 (11-14-19 to 12-19/19       Comment OTHER: Suspect PIN compression   Quick Add   Quick Add  Hand   Hand   Referring Physician Karol Yousif   Medical Diagnosis Left Lateral Epicondylitis   Orders Evaluate And Treat As Indicated   Progress/Recertification   Progress Note Due 12/19/19   Progress Note Completed 12/19/19   Adult OT Eval Goals   Hand Eval Goals 1;2   Hand Goal 1   Goal Identifier carrying-  patient can do with pain less than 2/10   Goal Description patient to be able to carry a can of pepsi in left hand with pain less than 1/10- can  malena bottle extended with 2/10 pain   Target Date 12/14/19   Date Met   (will continue to work toward)   Hand Goal 2   Goal Identifier reach and lift- can lift a bottle light shopping bag   Goal Description patient to be able to reach and lift up a small shopping bag with  pain less than 3/10   Target Date 12/27/19   Date Met 12/19/19   Subjective Report   Subjective Report has cold so not feeling as good overall   Patient Reported % Functional Improvement 90% improvement   Functional Improvement Reported Work Activities;Leisure Activities;Self care activities;Reaching   Initial Pain level 7/10  (at worst)   Current Pain level 1/10  (1.5/10)   Objective Measures   Objective Measures Objective Measure 1   Strength  35# at 90 with no pain - was 6/10 pa=in with gripping when extended will still have 4/10 pain   Objective Measure 1   Objective Measure palpation to PIN   Details 1-2/10 pain   Modalities   Modalities Ultrasound    Ultrasound   Ultrasound, Minutes (92407) 8 Minutes   Skilled Intervention to enhance blood flow for healing   Patient Response good   Treatment Detail PIN, 50% and 100%, and 1 mhz,1.0w/cm2   Therapeutic Interventions   Therapeutic  Interventions Therapeutic Procedure/Exercise;Manual Therapy;Self Care/Home Management   Therapeutic Procedure/Exercise   Therapeutic Procedure: strength, endurance, ROM, flexibility minutes (28538) 5   Skilled Intervention to enhance flexibility and strength verbal nd physical cuing needed   Patient Response pain with end range   HAND Therapeutic Exercise Other Exercises/Activities   Other Exer/Activities/Educ   Exercise 1 review HEP   Description 1 add hammer ex   Exercise 2 sup/pro x 5   Skilled Interventions   (verbal and physical cuing)   Manual Therapy   Manual Therapy Minutes (99010) 15   Skilled Intervention to increase blood flow, decrease pain and improve flexibility   Patient Response good- more flexible post   Treatment Detail Gua Sha tools , adali and mod pressure, flexors, extensors, biceps, triceps followed by stretching.   Assessments Completed   Assessments Completed nice gains- ready to continue on her own.   Education   Learner Patient   Readiness Eager   Method Booklet/handout;Explanation;Demonstration   Response Verbalizes understanding;Demonstrates understanding   Education Notes see home program   Plan   Home program nerve gliding, extrinsic stretching, activity modification, sleeping at night with pillow around elbow.   Updates to plan of care eccentric strengthening, hammer ex   Plan for next session D/C to home program    Total Session Time           Ernestina Kelly OTR/L CHT  Occupational Therapist, Certified Hand Therapist

## 2020-05-18 ENCOUNTER — HOSPITAL ENCOUNTER (OUTPATIENT)
Dept: MAMMOGRAPHY | Facility: CLINIC | Age: 55
Discharge: HOME | End: 2020-05-18
Attending: FAMILY MEDICINE | Admitting: FAMILY MEDICINE
Payer: COMMERCIAL

## 2020-05-18 DIAGNOSIS — Z12.31 VISIT FOR SCREENING MAMMOGRAM: ICD-10-CM

## 2020-05-18 PROCEDURE — 77067 SCR MAMMO BI INCL CAD: CPT

## 2020-06-11 DIAGNOSIS — J30.89 OTHER ALLERGIC RHINITIS: ICD-10-CM

## 2020-06-12 RX ORDER — FLUTICASONE PROPIONATE 50 MCG
SPRAY, SUSPENSION (ML) NASAL
Qty: 48 G | Refills: 1 | Status: SHIPPED | OUTPATIENT
Start: 2020-06-12 | End: 2020-09-02

## 2020-06-12 NOTE — TELEPHONE ENCOUNTER
Requested Prescriptions   Pending Prescriptions Disp Refills     fluticasone (FLONASE) 50 MCG/ACT nasal spray [Pharmacy Med Name: FLUTICASONE  SPR [id number]] 48 g 3     Sig: USE 2 SPRAYS IN EACH       NOSTRIL DAILY       Nasal Allergy Protocol Passed - 6/11/2020 12:43 PM        Passed - Patient is age 12 or older        Passed - Medication is active on med list           Last Written Prescription Date:  6/7/2019  Last Fill Quantity: 48g,  # refills: 3   Last office visit: 10/4/2019 with prescribing provider:  Benja    Future Office Visit:   Next 5 appointments (look out 90 days)    Jul 09, 2020  7:40 AM CDT  PHYSICAL with Karol Yousif MD  [facility] ([facility]) [address] [facility] [id number] [phone]

## 2020-07-03 ENCOUNTER — VIRTUAL VISIT (OUTPATIENT)
Dept: FAMILY MEDICINE | Facility: CLINIC | Age: 55
End: 2020-07-03
Payer: COMMERCIAL

## 2020-07-03 DIAGNOSIS — K21.9 GASTROESOPHAGEAL REFLUX DISEASE, ESOPHAGITIS PRESENCE NOT SPECIFIED: ICD-10-CM

## 2020-07-03 DIAGNOSIS — N95.2 POST-MENOPAUSAL ATROPHIC VAGINITIS: ICD-10-CM

## 2020-07-03 DIAGNOSIS — J45.40 MODERATE PERSISTENT ASTHMA WITHOUT COMPLICATION: ICD-10-CM

## 2020-07-03 DIAGNOSIS — J30.89 OTHER ALLERGIC RHINITIS: ICD-10-CM

## 2020-07-03 PROCEDURE — 99214 OFFICE O/P EST MOD 30 MIN: CPT | Mod: 95 | Performed by: FAMILY MEDICINE

## 2020-07-03 RX ORDER — ESTRADIOL 0.5 MG/1
TABLET ORAL
Qty: 24 TABLET | Refills: 3 | Status: SHIPPED | OUTPATIENT
Start: 2020-07-03 | End: 2021-10-08

## 2020-07-03 RX ORDER — OMEPRAZOLE 40 MG/1
40 CAPSULE, DELAYED RELEASE ORAL DAILY
Qty: 90 CAPSULE | Refills: 3 | Status: SHIPPED | OUTPATIENT
Start: 2020-07-03 | End: 2021-02-08

## 2020-07-03 RX ORDER — ALBUTEROL SULFATE 90 UG/1
2 AEROSOL, METERED RESPIRATORY (INHALATION) EVERY 4 HOURS PRN
Qty: 18 INHALER | Refills: 2 | Status: SHIPPED | OUTPATIENT
Start: 2020-07-03 | End: 2022-10-06

## 2020-07-03 RX ORDER — FLUTICASONE PROPIONATE 50 MCG
SPRAY, SUSPENSION (ML) NASAL
Qty: 48 G | Refills: 1 | Status: CANCELLED | OUTPATIENT
Start: 2020-07-03

## 2020-07-03 ASSESSMENT — ASTHMA QUESTIONNAIRES
QUESTION_3 LAST FOUR WEEKS HOW OFTEN DID YOUR ASTHMA SYMPTOMS (WHEEZING, COUGHING, SHORTNESS OF BREATH, CHEST TIGHTNESS OR PAIN) WAKE YOU UP AT NIGHT OR EARLIER THAN USUAL IN THE MORNING: NOT AT ALL
ACT_TOTALSCORE: 25
QUESTION_2 LAST FOUR WEEKS HOW OFTEN HAVE YOU HAD SHORTNESS OF BREATH: NOT AT ALL
QUESTION_4 LAST FOUR WEEKS HOW OFTEN HAVE YOU USED YOUR RESCUE INHALER OR NEBULIZER MEDICATION (SUCH AS ALBUTEROL): NOT AT ALL
QUESTION_5 LAST FOUR WEEKS HOW WOULD YOU RATE YOUR ASTHMA CONTROL: COMPLETELY CONTROLLED
QUESTION_1 LAST FOUR WEEKS HOW MUCH OF THE TIME DID YOUR ASTHMA KEEP YOU FROM GETTING AS MUCH DONE AT WORK, SCHOOL OR AT HOME: NONE OF THE TIME

## 2020-07-03 NOTE — PROGRESS NOTES
"Francesca Rivera is a 55 year old female who is being evaluated via a billable telephone visit.      The patient has been notified of following:     \"This telephone visit will be conducted via a call between you and your physician/provider. We have found that certain health care needs can be provided without the need for a physical exam.  This service lets us provide the care you need with a short phone conversation.  If a prescription is necessary we can send it directly to your pharmacy.  If lab work is needed we can place an order for that and you can then stop by our lab to have the test done at a later time.    Telephone visits are billed at different rates depending on your insurance coverage. During this emergency period, for some insurers they may be billed the same as an in-person visit.  Please reach out to your insurance provider with any questions.    If during the course of the call the physician/provider feels a telephone visit is not appropriate, you will not be charged for this service.\"    Patient has given verbal consent for Telephone visit?  Yes    What phone number would you like to be contacted at? [phone]    How would you like to obtain your AVS? Placido Valenzuela     Francesca Rivera is a 55 year old female who presents via phone visit today for the following health issues:    HPI  Medication Followup of acid reflex    Taking Medication as prescribed: yes    Side Effects:  None    Medication Helping Symptoms:  NO    Having more reflux symptoms, used to just be at night, now having a lot of daytime symptoms.  Is only on omeprazole 20 mg daily.            Asthma Follow-Up    Was ACT completed today?    Yes    ACT Total Scores 7/3/2020   ACT TOTAL SCORE -   ASTHMA ER VISITS -   ASTHMA HOSPITALIZATIONS -   ACT TOTAL SCORE (Goal Greater than or Equal to 20) 25   In the past 12 months, how many times did you visit the emergency room for your asthma without being admitted to the hospital? " "0   In the past 12 months, how many times were you hospitalized overnight because of your asthma? 0         How many days per week do you miss taking your asthma controller medication?  0    Please describe any recent triggers for your asthma: None    Have you had any Emergency Room Visits, Urgent Care Visits, or Hospital Admissions since your last office visit?  No      Reviewed and updated as needed this visit by Provider         Review of Systems   Constitutional, HEENT, cardiovascular, pulmonary, gi and gu systems are negative, except as otherwise noted.       Objective   Reported vitals:  [facility] 08/15/2006    healthy, alert and no distress  PSYCH: Alert and oriented times 3; coherent speech, normal   rate and volume, able to articulate logical thoughts, able   to abstract reason, no tangential thoughts, no hallucinations   or delusions  Her affect is normal  RESP: No cough, no audible wheezing, able to talk in full sentences  Remainder of exam unable to be completed due to telephone visits    Diagnostic Test Results:  Labs reviewed in Epic        Assessment/Plan:  1. Moderate persistent asthma without complication       Patient does not want her flonase or the advair sent to pharmacy yet as she does not need them.  Claims it doesn't work for us to \"profile medication\" that [facility] will just send them out.     - albuterol (VENTOLIN HFA) 108 (90 Base) MCG/ACT inhaler; Inhale 2 puffs into the lungs every 4 hours as needed for shortness of breath / dyspnea or wheezing FILL ALBUTEROL BRAND/GENERIC AS COVERED  Dispense: 18 Inhaler; Refill: 2    2. Other allergic rhinitis       3. Gastroesophageal reflux disease, esophagitis presence not specified   increase omeprazole to 40 mg daily.   Recheck in 4-6 weeks if not improved, would then recommend EGD.     - omeprazole (PRILOSEC) 40 MG DR capsule; Take 1 capsule (40 mg) by mouth daily  Dispense: 90 capsule; Refill: 3    4. Post-menopausal atrophic vaginitis   mammogram up " to date  Working well.  - estradiol (ESTRACE) 0.5 MG tablet; 0.5 mg tablet inserted vaginally twice a week  Dispense: 24 tablet; Refill: 3    No follow-ups on file.    This patient was seen virtually during the COVID-19 outbreak in attempts to keep healthy patients out of the clinic per CDC guidelines (practicing social distancing).  I evaluated them by phone/evisit and the note reflects our conversation/visit.      Patient has physical scheduled in September.     Phone call duration:  6 minutes    Karol Yousif MD

## 2020-07-04 ASSESSMENT — ASTHMA QUESTIONNAIRES: ACT_TOTALSCORE: 25

## 2020-07-22 ENCOUNTER — APPOINTMENT (OUTPATIENT)
Dept: GENERAL RADIOLOGY | Facility: CLINIC | Age: 55
End: 2020-07-22
Attending: NURSE PRACTITIONER
Payer: COMMERCIAL

## 2020-07-22 ENCOUNTER — NURSE TRIAGE (OUTPATIENT)
Dept: FAMILY MEDICINE | Facility: CLINIC | Age: 55
End: 2020-07-22

## 2020-07-22 ENCOUNTER — HOSPITAL ENCOUNTER (EMERGENCY)
Facility: CLINIC | Age: 55
Discharge: HOME | End: 2020-07-22
Attending: NURSE PRACTITIONER | Admitting: NURSE PRACTITIONER
Payer: COMMERCIAL

## 2020-07-22 VITALS
DIASTOLIC BLOOD PRESSURE: 101 MMHG | BODY MASS INDEX: 29.88 KG/M2 | TEMPERATURE: 98.9 F | HEART RATE: 76 BPM | WEIGHT: 170 LBS | RESPIRATION RATE: 20 BRPM | SYSTOLIC BLOOD PRESSURE: 143 MMHG | OXYGEN SATURATION: 95 %

## 2020-07-22 DIAGNOSIS — R07.9 CHEST PAIN: ICD-10-CM

## 2020-07-22 DIAGNOSIS — R05.9 COUGH: ICD-10-CM

## 2020-07-22 LAB
ANION GAP SERPL CALCULATED.3IONS-SCNC: 4 MMOL/L (ref 3–14)
BASOPHILS # BLD AUTO: 0 10E9/L (ref 0–0.2)
BASOPHILS NFR BLD AUTO: 0.6 %
BUN SERPL-MCNC: 17 MG/DL (ref 7–30)
CALCIUM SERPL-MCNC: 8.8 MG/DL (ref 8.5–10.1)
CHLORIDE SERPL-SCNC: 109 MMOL/L (ref 94–109)
CO2 SERPL-SCNC: 27 MMOL/L (ref 20–32)
CREAT SERPL-MCNC: 0.64 MG/DL (ref 0.52–1.04)
DIFFERENTIAL METHOD BLD: NORMAL
EOSINOPHIL # BLD AUTO: 0.3 10E9/L (ref 0–0.7)
EOSINOPHIL NFR BLD AUTO: 3.9 %
ERYTHROCYTE [DISTWIDTH] IN BLOOD BY AUTOMATED COUNT: 14.4 % (ref 10–15)
GFR SERPL CREATININE-BSD FRML MDRD: >90 ML/MIN/{1.73_M2}
GLUCOSE SERPL-MCNC: 100 MG/DL (ref 70–99)
HCT VFR BLD AUTO: 39.7 % (ref 35–47)
HGB BLD-MCNC: 12.8 G/DL (ref 11.7–15.7)
IMM GRANULOCYTES # BLD: 0 10E9/L (ref 0–0.4)
IMM GRANULOCYTES NFR BLD: 0.3 %
LYMPHOCYTES # BLD AUTO: 2.1 10E9/L (ref 0.8–5.3)
LYMPHOCYTES NFR BLD AUTO: 30.8 %
MCH RBC QN AUTO: 29.8 PG (ref 26.5–33)
MCHC RBC AUTO-ENTMCNC: 32.2 G/DL (ref 31.5–36.5)
MCV RBC AUTO: 93 FL (ref 78–100)
MONOCYTES # BLD AUTO: 0.5 10E9/L (ref 0–1.3)
MONOCYTES NFR BLD AUTO: 7 %
NEUTROPHILS # BLD AUTO: 3.9 10E9/L (ref 1.6–8.3)
NEUTROPHILS NFR BLD AUTO: 57.4 %
NRBC # BLD AUTO: 0 10*3/UL
NRBC BLD AUTO-RTO: 0 /100
PLATELET # BLD AUTO: 353 10E9/L (ref 150–450)
POTASSIUM SERPL-SCNC: 4 MMOL/L (ref 3.4–5.3)
RBC # BLD AUTO: 4.29 10E12/L (ref 3.8–5.2)
SODIUM SERPL-SCNC: 140 MMOL/L (ref 133–144)
TROPONIN I SERPL-MCNC: <0.015 UG/L (ref 0–0.04)
WBC # BLD AUTO: 6.9 10E9/L (ref 4–11)

## 2020-07-22 PROCEDURE — 84484 ASSAY OF TROPONIN QUANT: CPT | Performed by: NURSE PRACTITIONER

## 2020-07-22 PROCEDURE — 93005 ELECTROCARDIOGRAM TRACING: CPT | Mod: 76 | Performed by: NURSE PRACTITIONER

## 2020-07-22 PROCEDURE — 85025 COMPLETE CBC W/AUTO DIFF WBC: CPT | Performed by: NURSE PRACTITIONER

## 2020-07-22 PROCEDURE — C9803 HOPD COVID-19 SPEC COLLECT: HCPCS | Performed by: NURSE PRACTITIONER

## 2020-07-22 PROCEDURE — 93010 ELECTROCARDIOGRAM REPORT: CPT | Mod: 76 | Performed by: NURSE PRACTITIONER

## 2020-07-22 PROCEDURE — 93010 ELECTROCARDIOGRAM REPORT: CPT | Mod: Z6 | Performed by: NURSE PRACTITIONER

## 2020-07-22 PROCEDURE — 99285 EMERGENCY DEPT VISIT HI MDM: CPT | Performed by: NURSE PRACTITIONER

## 2020-07-22 PROCEDURE — 25000125 ZZHC RX 250: Performed by: NURSE PRACTITIONER

## 2020-07-22 PROCEDURE — 99285 EMERGENCY DEPT VISIT HI MDM: CPT | Mod: 25 | Performed by: NURSE PRACTITIONER

## 2020-07-22 PROCEDURE — 71045 X-RAY EXAM CHEST 1 VIEW: CPT

## 2020-07-22 PROCEDURE — U0003 INFECTIOUS AGENT DETECTION BY NUCLEIC ACID (DNA OR RNA); SEVERE ACUTE RESPIRATORY SYNDROME CORONAVIRUS 2 (SARS-COV-2) (CORONAVIRUS DISEASE [COVID-19]), AMPLIFIED PROBE TECHNIQUE, MAKING USE OF HIGH THROUGHPUT TECHNOLOGIES AS DESCRIBED BY CMS-2020-01-R: HCPCS | Performed by: NURSE PRACTITIONER

## 2020-07-22 PROCEDURE — 25000132 ZZH RX MED GY IP 250 OP 250 PS 637: Performed by: NURSE PRACTITIONER

## 2020-07-22 PROCEDURE — 80048 BASIC METABOLIC PNL TOTAL CA: CPT | Performed by: NURSE PRACTITIONER

## 2020-07-22 PROCEDURE — 93005 ELECTROCARDIOGRAM TRACING: CPT | Performed by: NURSE PRACTITIONER

## 2020-07-22 RX ADMIN — LIDOCAINE HYDROCHLORIDE 30 ML: 20 SOLUTION ORAL; TOPICAL at 11:32

## 2020-07-22 ASSESSMENT — ENCOUNTER SYMPTOMS
CHILLS: 0
EYE DISCHARGE: 0
EYE REDNESS: 0
FATIGUE: 0
DYSURIA: 0
FREQUENCY: 0
RHINORRHEA: 0
JOINT SWELLING: 0
HEADACHES: 0
SINUS PAIN: 0
LIGHT-HEADEDNESS: 0
COUGH: 0
WEAKNESS: 0
SORE THROAT: 0
SHORTNESS OF BREATH: 0
VOMITING: 0
DIFFICULTY URINATING: 0
PALPITATIONS: 0
DIZZINESS: 0
CHEST TIGHTNESS: 1
SINUS PRESSURE: 0
TROUBLE SWALLOWING: 0
FEVER: 0
ARTHRALGIAS: 0
ABDOMINAL PAIN: 0
NUMBNESS: 0
MYALGIAS: 0
NAUSEA: 0

## 2020-07-22 NOTE — ED PROVIDER NOTES
History     Chief Complaint   Patient presents with     Chest Pain     chest pain for 2 weeks, cough this am, denies SOA     HPI  Francesca Rivera is a 55 year old female with a history of reflux and asthma who presents to the emergency department for evaluation of chest pain on/off for ~2 weeks. Pain is located to the mid-sternum and described as a tightening, not worsened with exertion, does not radiate. No numbness or tingling. Denies aggravating factors. Subsides on its own. Reports increased resting heart rate from her FitBit watch. Denies fever, headache, shortness of breath, abdominal pain, edema or rash. Woke this morning with a dry cough. Was instructed to increase omeprazole dose but has yet to do this.  Reports asthma is well controlled with her home inhalers.    Allergies:  Allergies   Allergen Reactions     Nka [No Known Allergies]        Problem List:    Patient Active Problem List    Diagnosis Date Noted     Cervical high risk HPV (human papillomavirus) test positive 06/07/2019     Priority: Medium     2003, 2004, 2005, 2006, 2007, 2009, 2012 NIL paps.  2015 NIL pap, Neg HPV.   6/7/19 NIL pap, + HR HPV (not 16 or 18). Plan cotest in 1 year.        Age-related osteoporosis without current pathological fracture 06/08/2018     Priority: Medium     Seasonal Moderate persistent asthma 01/23/2014     Priority: Medium     Lichen sclerosis et atrophicus 12/01/2010     Priority: Medium     Confirmed on biopsy  Rx clobetasol       CARDIOVASCULAR SCREENING; LDL GOAL LESS THAN 160 10/31/2010     Priority: Medium     Post-menopausal atrophic vaginitis 05/07/2009     Priority: Medium     Asymptomatic postmenopausal status 12/04/2007     Priority: Medium     Problem list name updated by automated process. Provider to review       Esophageal reflux 09/20/2007     Priority: Medium     Mild intermittent asthma 12/29/2005     Priority: Medium     history of winter wheezing, doing very well on Advair, has not needed  albuterol at all       Allergic rhinitis 12/29/2005     Priority: Medium     Problem list name updated by automated process. Provider to review          Past Medical History:    Past Medical History:   Diagnosis Date     Asthma      Cervical high risk HPV (human papillomavirus) test positive 06/07/2019     Tobacco use disorder 12/29/2005       Past Surgical History:    Past Surgical History:   Procedure Laterality Date     COLONOSCOPY N/A 5/20/2016    Procedure: COLONOSCOPY;  Surgeon: Darell Mayes MD;  Location: [facility]     TUBAL LIGATION         Family History:    Family History   Problem Relation Age of Onset     Alcohol/Drug Father      Diabetes Father      Hypertension Father      Psychotic Disorder Father         bipolar     Asthma Father      Cancer - colorectal Father 60     Depression Father      Cancer Maternal Grandfather         lung     Arthritis Maternal Grandfather      Cancer Paternal Grandmother         stomach     Psychotic Disorder Paternal Grandmother         bipolar     Depression Paternal Grandmother      Arthritis Paternal Grandmother      Breast Cancer Maternal Grandmother      Cancer - colorectal Maternal Grandmother      Arthritis Maternal Grandmother      Depression Daughter      Lupus Mother      C.A.D. Paternal Grandfather      Cerebrovascular Disease Paternal Grandfather      Arthritis Paternal Grandfather      Depression Son        Social History:  Marital Status:   [4]  Social History     Tobacco Use     Smoking status: Former Smoker     Packs/day: 1.00     Years: 28.00     Pack years: 28.00     Types: Cigarettes     Last attempt to quit: 5/20/2010     Years since quitting: 10.1     Smokeless tobacco: Never Used   Substance Use Topics     Alcohol use: Yes     Comment: occ     Drug use: No        Medications:    ADVIL 200 MG OR CAPS  albuterol (VENTOLIN HFA) 108 (90 Base) MCG/ACT inhaler  CITRACAL + D 250-62.5 MG-UNIT OR TABS  clobetasol (TEMOVATE) 0.05 % external  ointment  estradiol (ESTRACE) 0.5 MG tablet  FLAXSEED OIL OR  fluticasone (FLONASE) 50 MCG/ACT nasal spray  fluticasone-salmeterol (ADVAIR DISKUS) 250-50 MCG/DOSE inhaler  omeprazole (PRILOSEC) 20 MG DR capsule  omeprazole (PRILOSEC) 40 MG DR capsule          Review of Systems   Constitutional: Negative for chills, fatigue and fever.   HENT: Negative for congestion, ear discharge, ear pain, rhinorrhea, sinus pressure, sinus pain, sore throat and trouble swallowing.    Eyes: Negative for discharge and redness.   Respiratory: Positive for chest tightness. Negative for cough and shortness of breath.    Cardiovascular: Positive for chest pain. Negative for palpitations and leg swelling.   Gastrointestinal: Negative for abdominal pain, nausea and vomiting.   Genitourinary: Negative for difficulty urinating, dysuria, frequency, pelvic pain and urgency.   Musculoskeletal: Negative for arthralgias, joint swelling and myalgias.   Skin: Negative for rash.   Neurological: Negative for dizziness, weakness, light-headedness, numbness and headaches.     Physical Exam   BP: (!) 143/90  Pulse: 73  Heart Rate: 86  Temp: 98.9  F (37.2  C)  Resp: 14  Weight: 77.1 kg (170 lb)  SpO2: 99 %      Physical Exam  Constitutional:       General: She is not in acute distress.     Appearance: She is well-developed. She is not diaphoretic.   Eyes:      Conjunctiva/sclera: Conjunctivae normal.      Pupils: Pupils are equal, round, and reactive to light.   Neck:      Musculoskeletal: Normal range of motion and neck supple.   Cardiovascular:      Rate and Rhythm: Normal rate and regular rhythm.  No extrasystoles are present.     Pulses: Normal pulses.      Heart sounds: Normal heart sounds.   Pulmonary:      Effort: Pulmonary effort is normal. No respiratory distress.      Breath sounds: Normal breath sounds and air entry. No decreased air movement. No decreased breath sounds, wheezing or rhonchi.   Abdominal:      General: Bowel sounds are normal.  There is no distension.      Palpations: Abdomen is soft.      Tenderness: There is no abdominal tenderness.   Musculoskeletal: Normal range of motion.      Right lower leg: No edema.      Left lower leg: No edema.   Skin:     General: Skin is warm.      Capillary Refill: Capillary refill takes less than 2 seconds.   Neurological:      Mental Status: She is alert and oriented to person, place, and time.   Psychiatric:         Mood and Affect: Mood normal.         Behavior: Behavior normal.       ED Course        Procedures               EKG Interpretation:      Interpreted by JAZIEL Barrios CNP  Time reviewed: 0958  Symptoms at time of EKG: none   Rhythm: normal sinus   Rate: normal  Axis: normal  Ectopy: none  Conduction: normal  ST Segments/ T Waves: No ST-T wave changes  Q Waves: none  Comparison to prior: Unchanged from 11/08/2007    Clinical Impression: normal EKG         EKG Interpretation:      EKG Number: 2.  1103  Interpreted by JAZIEL Barrios CNP  Symptoms at time of EKG: chest pain   Rhythm: Normal sinus   Rate: Normal  Axis: Normal  Ectopy: None  Conduction: Normal  ST Segments/ T Waves: No ST-T wave changes and No acute ischemic changes  Q Waves: None  Comparison to prior: Unchanged    Clinical Impression: normal EKG    Results for orders placed or performed during the hospital encounter of 07/22/20 (from the past 24 hour(s))   CBC with platelets, differential   Result Value Ref Range    WBC 6.9 4.0 - 11.0 10e9/L    RBC Count 4.29 3.8 - 5.2 10e12/L    Hemoglobin 12.8 11.7 - 15.7 g/dL    Hematocrit 39.7 35.0 - 47.0 %    MCV 93 78 - 100 fl    MCH 29.8 26.5 - 33.0 pg    MCHC 32.2 31.5 - 36.5 g/dL    RDW 14.4 10.0 - 15.0 %    Platelet Count 353 150 - 450 10e9/L    Diff Method Automated Method     % Neutrophils 57.4 %    % Lymphocytes 30.8 %    % Monocytes 7.0 %    % Eosinophils 3.9 %    % Basophils 0.6 %    % Immature Granulocytes 0.3 %    Nucleated RBCs 0 0 /100    Absolute Neutrophil 3.9 1.6 -  8.3 10e9/L    Absolute Lymphocytes 2.1 0.8 - 5.3 10e9/L    Absolute Monocytes 0.5 0.0 - 1.3 10e9/L    Absolute Eosinophils 0.3 0.0 - 0.7 10e9/L    Absolute Basophils 0.0 0.0 - 0.2 10e9/L    Abs Immature Granulocytes 0.0 0 - 0.4 10e9/L    Absolute Nucleated RBC 0.0    Basic metabolic panel   Result Value Ref Range    Sodium 140 133 - 144 mmol/L    Potassium 4.0 3.4 - 5.3 mmol/L    Chloride 109 94 - 109 mmol/L    Carbon Dioxide 27 20 - 32 mmol/L    Anion Gap 4 3 - 14 mmol/L    Glucose 100 (H) 70 - 99 mg/dL    Urea Nitrogen 17 7 - 30 mg/dL    Creatinine 0.64 0.52 - 1.04 mg/dL    GFR Estimate >90 >60 mL/min/[1.73_m2]    GFR Estimate If Black >90 >60 mL/min/[1.73_m2]    Calcium 8.8 8.5 - 10.1 mg/dL   Troponin I   Result Value Ref Range    Troponin I ES <0.015 0.000 - 0.045 ug/L   XR Chest Port 1 View    Narrative    XR CHEST PORT 1 VW 7/22/2020 11:38 AM    HISTORY: Pain. Cough.    COMPARISON: 12/31/2018.      Impression    IMPRESSION: A single view the chest shows no acute or active  cardiopulmonary disease.     SUSANA KITCHEN MD       Medications   lidocaine (XYLOCAINE) 2 % 15 mL, alum & mag hydroxide-simethicone (MAALOX  ES) 15 mL GI Cocktail (30 mLs Oral Given 7/22/20 1132)       Assessments & Plan (with Medical Decision Making)   Francesca Rivera is a 55 year old female with a history of reflux and asthma who presents to the emergency department for evaluation of chest pain on/off for ~2 weeks. Pain is located to the mid-sternum and described as a tightening, not worsened with exertion, does not radiate. No numbness or tingling. Denies aggravating factors. Subsides on its own.    Patient is well appearing on physical exam without worrisome findings. Initial EKG completed normal. Had the opportunity for 2nd EKG during symptoms of chest pain, remains normal. Reassuring workup with normal CBC, BMP, troponin and chest xray. Symptomatic improvement after GI cocktail. Discussed potential reflux as cause of symptoms vs  cardiac origin. Low suspicion for acute cardiac event. Blood pressure elevated during visit, averaging 140s/70-80s, plan to monitor at home and follow up with PCP regarding this. Discussed at length worrisome reasons to seek urgent/emergent reevaluation. Return precautions reviewed, all questions answered. Patient is agreeable to plan of care and discharged in no acute distress.     I have reviewed the nursing notes.    I have reviewed the findings, diagnosis, plan and need for follow up with the patient.  Discharge Medication List as of 7/22/2020 12:25 PM        Final diagnoses:   Chest pain   Cough     7/22/2020   [facility] EMERGENCY DEPARTMENT     Yuridia Lopez, APRN CNP  07/22/20 8830

## 2020-07-22 NOTE — TELEPHONE ENCOUNTER
"  Patient reports on and off chest pain for last few weeks.    Usually experiences in the evening, today she was experiencing it in the morning.  Started about 30 minutes ago, mild, getting better.  Hx of GERD, omeprazole dose increased however she did not start taking.  Does not have aspirin at home.  RN advised ER evaluation.  Patient prefers to drive to ER.      Reason for Disposition    Chest pain lasting longer than 5 minutes and ANY of the following:* Over 50 years old* Over 30 years old and at least one cardiac risk factor (i.e., high blood pressure, diabetes, high cholesterol, obesity, smoker or strong family history of heart disease)* Pain is crushing, pressure-like, or heavy * Took nitroglycerin and chest pain was not relieved* History of heart disease (i.e., angina, heart attack, bypass surgery, angioplasty, CHF)    Additional Information    Negative: Severe difficulty breathing (e.g., struggling for each breath, speaks in single words)    Negative: Passed out (i.e., fainted, collapsed and was not responding)    Negative: Visible sweat on face or sweat dripping down face    Negative: Sounds like a life-threatening emergency to the triager    Answer Assessment - Initial Assessment Questions  1. LOCATION: \"Where does it hurt?\"        Center of chest, tightness.  Has been having reflux issues over last couple months.  Her dose omeprazole was increased however she has not started that yet.  2. RADIATION: \"Does the pain go anywhere else?\" (e.g., into neck, jaw, arms, back)      No radiating pain  3. ONSET: \"When did the chest pain begin?\" (Minutes, hours or days)       On and off for a few weeks.  4. PATTERN \"Does the pain come and go, or has it been constant since it started?\"  \"Does it get worse with exertion?\"         5. DURATION: \"How long does it last\" (e.g., seconds, minutes, hours)      Started about 30 minutes ago  6. SEVERITY: \"How bad is the pain?\"  (e.g., Scale 1-10; mild, moderate, or severe)     - " "MILD (1-3): doesn't interfere with normal activities      - MODERATE (4-7): interferes with normal activities or awakens from sleep     - SEVERE (8-10): excruciating pain, unable to do any normal activities        Mild  7. CARDIAC RISK FACTORS: \"Do you have any history of heart problems or risk factors for heart disease?\" (e.g., prior heart attack, angina; high blood pressure, diabetes, being overweight, high cholesterol, smoking, or strong family history of heart disease)      Overweight.  Went to dentist and noted bp was high, not sure of reading.  8. PULMONARY RISK FACTORS: \"Do you have any history of lung disease?\"  (e.g., blood clots in lung, asthma, emphysema, birth control pills)      asthma  9. CAUSE: \"What do you think is causing the chest pain?\"      unknown  10. OTHER SYMPTOMS: \"Do you have any other symptoms?\" (e.g., dizziness, nausea, vomiting, sweating, fever, difficulty breathing, cough)        No other symptoms noted.  11. PREGNANCY: \"Is there any chance you are pregnant?\" \"When was your last menstrual period?\"        N/A    Protocols used: CHEST PAIN-A-OH      "

## 2020-07-22 NOTE — ED AVS SNAPSHOT
[facility] Emergency Department  [address] [address]  Phone:  [phone]  Fax:  [phone]                                    Francesca Rivera   MRN: [id number]    Department:  [facility] Emergency Department   Date of Visit:  7/22/2020           After Visit Summary Signature Page    I have received my discharge instructions, and my questions have been answered. I have discussed any challenges I see with this plan with the nurse or doctor.    ..........................................................................................................................................  Patient/Patient Representative Signature      ..........................................................................................................................................  Patient Representative Print Name and Relationship to Patient    ..................................................               ................................................  Date                                   Time    ..........................................................................................................................................  Reviewed by Signature/Title    ...................................................              ..............................................  Date                                               Time          22EPIC Rev 08/18

## 2020-07-23 LAB
SARS-COV-2 RNA SPEC QL NAA+PROBE: NOT DETECTED
SPECIMEN SOURCE: NORMAL

## 2020-09-01 ASSESSMENT — ENCOUNTER SYMPTOMS
MYALGIAS: 0
EYE PAIN: 0
NERVOUS/ANXIOUS: 0
HEADACHES: 0
DIARRHEA: 0
DIZZINESS: 0
JOINT SWELLING: 0
COUGH: 0
FREQUENCY: 0
SHORTNESS OF BREATH: 0
ARTHRALGIAS: 0
DYSURIA: 0
FEVER: 0
CHILLS: 0
NAUSEA: 0
BREAST MASS: 0
HEMATOCHEZIA: 0
PALPITATIONS: 0
HEARTBURN: 0
CONSTIPATION: 0
HEMATURIA: 0
SORE THROAT: 0
WEAKNESS: 0
PARESTHESIAS: 0
ABDOMINAL PAIN: 0

## 2020-09-02 ENCOUNTER — ANCILLARY PROCEDURE (OUTPATIENT)
Dept: GENERAL RADIOLOGY | Facility: CLINIC | Age: 55
End: 2020-09-02
Attending: FAMILY MEDICINE
Payer: COMMERCIAL

## 2020-09-02 ENCOUNTER — OFFICE VISIT (OUTPATIENT)
Dept: FAMILY MEDICINE | Facility: CLINIC | Age: 55
End: 2020-09-02
Payer: COMMERCIAL

## 2020-09-02 VITALS
HEIGHT: 64 IN | HEART RATE: 84 BPM | BODY MASS INDEX: 29.37 KG/M2 | RESPIRATION RATE: 16 BRPM | OXYGEN SATURATION: 97 % | DIASTOLIC BLOOD PRESSURE: 60 MMHG | WEIGHT: 172 LBS | TEMPERATURE: 98.3 F | SYSTOLIC BLOOD PRESSURE: 108 MMHG

## 2020-09-02 DIAGNOSIS — M85.80 OSTEOPENIA, UNSPECIFIED LOCATION: ICD-10-CM

## 2020-09-02 DIAGNOSIS — J45.40 MODERATE PERSISTENT ASTHMA WITHOUT COMPLICATION: ICD-10-CM

## 2020-09-02 DIAGNOSIS — Z11.4 SCREENING FOR HIV WITHOUT PRESENCE OF RISK FACTORS: ICD-10-CM

## 2020-09-02 DIAGNOSIS — R87.810 CERVICAL HIGH RISK HPV (HUMAN PAPILLOMAVIRUS) TEST POSITIVE: ICD-10-CM

## 2020-09-02 DIAGNOSIS — Z00.00 ROUTINE GENERAL MEDICAL EXAMINATION AT A HEALTH CARE FACILITY: Primary | ICD-10-CM

## 2020-09-02 DIAGNOSIS — J30.89 OTHER ALLERGIC RHINITIS: ICD-10-CM

## 2020-09-02 DIAGNOSIS — L94.0 CIRCUMSCRIBED SCLERODERMA: ICD-10-CM

## 2020-09-02 DIAGNOSIS — S99.921A INJURY OF TOE ON RIGHT FOOT, INITIAL ENCOUNTER: ICD-10-CM

## 2020-09-02 DIAGNOSIS — E28.39 ESTROGEN DEFICIENCY: ICD-10-CM

## 2020-09-02 LAB
CHOLEST SERPL-MCNC: 287 MG/DL
HBA1C MFR BLD: 5.4 % (ref 0–5.6)
HDLC SERPL-MCNC: 83 MG/DL
LDLC SERPL CALC-MCNC: 187 MG/DL
NONHDLC SERPL-MCNC: 204 MG/DL
TRIGL SERPL-MCNC: 86 MG/DL

## 2020-09-02 PROCEDURE — 36415 COLL VENOUS BLD VENIPUNCTURE: CPT | Performed by: FAMILY MEDICINE

## 2020-09-02 PROCEDURE — 83036 HEMOGLOBIN GLYCOSYLATED A1C: CPT | Performed by: FAMILY MEDICINE

## 2020-09-02 PROCEDURE — 87624 HPV HI-RISK TYP POOLED RSLT: CPT | Performed by: FAMILY MEDICINE

## 2020-09-02 PROCEDURE — G0145 SCR C/V CYTO,THINLAYER,RESCR: HCPCS | Performed by: FAMILY MEDICINE

## 2020-09-02 PROCEDURE — 99396 PREV VISIT EST AGE 40-64: CPT | Mod: 25 | Performed by: FAMILY MEDICINE

## 2020-09-02 PROCEDURE — 99213 OFFICE O/P EST LOW 20 MIN: CPT | Mod: 25 | Performed by: FAMILY MEDICINE

## 2020-09-02 PROCEDURE — 87389 HIV-1 AG W/HIV-1&-2 AB AG IA: CPT | Performed by: FAMILY MEDICINE

## 2020-09-02 PROCEDURE — 90682 RIV4 VACC RECOMBINANT DNA IM: CPT | Performed by: FAMILY MEDICINE

## 2020-09-02 PROCEDURE — 73660 X-RAY EXAM OF TOE(S): CPT | Mod: RT

## 2020-09-02 PROCEDURE — 80061 LIPID PANEL: CPT | Performed by: FAMILY MEDICINE

## 2020-09-02 PROCEDURE — 90471 IMMUNIZATION ADMIN: CPT | Performed by: FAMILY MEDICINE

## 2020-09-02 RX ORDER — CLOBETASOL PROPIONATE 0.5 MG/G
OINTMENT TOPICAL
Qty: 45 G | Refills: 3 | Status: SHIPPED | OUTPATIENT
Start: 2020-09-02 | End: 2021-10-08

## 2020-09-02 RX ORDER — FLUTICASONE PROPIONATE 50 MCG
SPRAY, SUSPENSION (ML) NASAL
Qty: 48 G | Refills: 1 | Status: SHIPPED | OUTPATIENT
Start: 2020-09-02 | End: 2021-04-13

## 2020-09-02 ASSESSMENT — MIFFLIN-ST. JEOR: SCORE: 1361.22

## 2020-09-02 ASSESSMENT — ENCOUNTER SYMPTOMS
PALPITATIONS: 0
JOINT SWELLING: 0
EYE PAIN: 0
HEADACHES: 0
FEVER: 0
DIZZINESS: 0
HEMATURIA: 0
ABDOMINAL PAIN: 0
CHILLS: 0
SORE THROAT: 0
HEARTBURN: 0
COUGH: 0
CONSTIPATION: 0
ARTHRALGIAS: 0
SHORTNESS OF BREATH: 0
NERVOUS/ANXIOUS: 0
DYSURIA: 0
WEAKNESS: 0
FREQUENCY: 0
BREAST MASS: 0
DIARRHEA: 0
MYALGIAS: 0
PARESTHESIAS: 0
NAUSEA: 0
HEMATOCHEZIA: 0

## 2020-09-02 ASSESSMENT — PAIN SCALES - GENERAL: PAINLEVEL: NO PAIN (0)

## 2020-09-02 NOTE — PATIENT INSTRUCTIONS
"The new Shingrix is supposed to be more effective at preventing shingles.  Even if you had Zostavax, this is recommended. I encourage people to check with their pharmacy (or ours) and have them run it through to check the cost.  It's often better covered through your pharmacy benefit.  It is a two part vaccine series - one now and one in 2-6 months.  Many people will feel a bit \"flu like\" with fever and body aches for a few days after the injection.  Taking ibuprofen can help with this.      Schedule DEXA [phone]    Our Clinic hours are:  Mondays    7:20 am - 7 pm  Tues -  Fri  7:20 am - 5 pm    Clinic Phone: [phone]    The clinic lab opens at 7:30 am Mon - Fri and appointments are required.    [facility] [phone]  Monday  8 am - 7pm  Tues - Fri 8 am - 5:30 pm         Preventive Health Recommendations  Female Ages 50 - 64    Yearly exam: See your health care provider every year in order to  o Review health changes.   o Discuss preventive care.    o Review your medicines if your doctor has prescribed any.      Get a Pap test every three years (unless you have an abnormal result and your provider advises testing more often).    If you get Pap tests with HPV test, you only need to test every 5 years, unless you have an abnormal result.     You do not need a Pap test if your uterus was removed (hysterectomy) and you have not had cancer.    You should be tested each year for STDs (sexually transmitted diseases) if you're at risk.     Have a mammogram every 1 to 2 years.    Have a colonoscopy at age 50, or have a yearly FIT test (stool test). These exams screen for colon cancer.      Have a cholesterol test every 5 years, or more often if advised.    Have a diabetes test (fasting glucose) every three years. If you are at risk for diabetes, you should have this test more often.     If you are at risk for osteoporosis (brittle bone disease), think about having a bone density scan " (DEXA).    Shots: Get a flu shot each year. Get a tetanus shot every 10 years.    Nutrition:     Eat at least 5 servings of fruits and vegetables each day.    Eat whole-grain bread, whole-wheat pasta and brown rice instead of white grains and rice.    Get adequate Calcium and Vitamin D.     Lifestyle    Exercise at least 150 minutes a week (30 minutes a day, 5 days a week). This will help you control your weight and prevent disease.    Limit alcohol to one drink per day.    No smoking.     Wear sunscreen to prevent skin cancer.     See your dentist every six months for an exam and cleaning.    See your eye doctor every 1 to 2 years.

## 2020-09-02 NOTE — PROGRESS NOTES
SUBJECTIVE:   CC: Francesca Rivera is an 55 year old woman who presents for preventive health visit.     Healthy Habits:     Getting at least 3 servings of Calcium per day:  Yes    Bi-annual eye exam:  Yes    Dental care twice a year:  Yes    Sleep apnea or symptoms of sleep apnea:  None    Diet:  Regular (no restrictions)    Frequency of exercise:  2-3 days/week    Duration of exercise:  15-30 minutes    Taking medications regularly:  Yes    Medication side effects:  None    PHQ-2 Total Score: 0    Additional concerns today:  No          Asthma Follow-Up    Was ACT completed today?    Yes    ACT Total Scores 9/2/2020   ACT TOTAL SCORE -   ASTHMA ER VISITS -   ASTHMA HOSPITALIZATIONS -   ACT TOTAL SCORE (Goal Greater than or Equal to 20) 25   In the past 12 months, how many times did you visit the emergency room for your asthma without being admitted to the hospital? 0   In the past 12 months, how many times were you hospitalized overnight because of your asthma? 0         How many days per week do you miss taking your asthma controller medication?  0    Please describe any recent triggers for your asthma: humidity and cold air    Have you had any Emergency Room Visits, Urgent Care Visits, or Hospital Admissions since your last office visit?  No      Today's PHQ-2 Score:   PHQ-2 ( 1999 [employer]) 9/2/2020   Q1: Little interest or pleasure in doing things 0   Q2: Feeling down, depressed or hopeless 0   PHQ-2 Score 0   Q1: Little interest or pleasure in doing things -   Q2: Feeling down, depressed or hopeless -   PHQ-2 Score -       Abuse: Current or Past (Physical, Sexual or Emotional) - No  Do you feel safe in your environment? Yes        Social History     Tobacco Use     Smoking status: Former Smoker     Packs/day: 1.00     Years: 28.00     Pack years: 28.00     Types: Cigarettes     Last attempt to quit: 5/20/2010     Years since quitting: 10.2     Smokeless tobacco: Never Used   Substance Use Topics     Alcohol  use: Yes     Comment: occ     If you drink alcohol do you typically have >3 drinks per day or >7 drinks per week? No     No flowsheet data found.    Reviewed orders with patient.  Reviewed health maintenance and updated orders accordingly - Yes  Lab work is in process    Mammogram Screening: Patient over age 50, mutual decision to screen reflected in health maintenance.    Pertinent mammograms are reviewed under the imaging tab.  History of abnormal Pap smear: YES - updated in Problem List and Health Maintenance accordingly  PAP / HPV Latest Ref Rng & Units 6/7/2019 4/13/2015 3/20/2012   PAP - NIL NIL NIL   HPV 16 DNA NEG:Negative Negative Negative -   HPV 18 DNA NEG:Negative Negative Negative -   OTHER HR HPV NEG:Negative Positive(A) Negative -     Reviewed and updated as needed this visit by clinical staff  Tobacco  Allergies  Meds         Reviewed and updated as needed this visit by Provider            Review of Systems   Constitutional: Negative for chills and fever.   HENT: Negative for congestion, ear pain, hearing loss and sore throat.    Eyes: Positive for visual disturbance. Negative for pain.   Respiratory: Negative for cough and shortness of breath.    Cardiovascular: Negative for chest pain, palpitations and peripheral edema.   Gastrointestinal: Negative for abdominal pain, constipation, diarrhea, heartburn, hematochezia and nausea.   Breasts:  Negative for tenderness, breast mass and discharge.   Genitourinary: Negative for dysuria, frequency, genital sores, hematuria, pelvic pain, urgency, vaginal bleeding and vaginal discharge.   Musculoskeletal: Negative for arthralgias, joint swelling and myalgias.   Skin: Negative for rash.   Neurological: Negative for dizziness, weakness, headaches and paresthesias.   Psychiatric/Behavioral: Negative for mood changes. The patient is not nervous/anxious.      Thinks she may have broken a toe 1 month ago - right little toe.  Gradually better over time but still  "having some pain.          OBJECTIVE:   /60   Pulse 84   Temp 98.3  F (36.8  C) (Tympanic)   Resp 16   Ht 1.619 m (5' 3.75\")   Wt 78 kg (172 lb)   LMP 08/15/2006   SpO2 97%   BMI 29.76 kg/m    Physical Exam  GENERAL: healthy, alert and no distress  EYES: Eyes grossly normal to inspection, PERRL and conjunctivae and sclerae normal  HENT: ear canals and TM's normal, nose and mouth without ulcers or lesions  NECK: no adenopathy, no asymmetry, masses, or scars and thyroid normal to palpation  RESP: lungs clear to auscultation - no rales, rhonchi or wheezes  BREAST: normal without masses, tenderness or nipple discharge and no palpable axillary masses or adenopathy  CV: regular rate and rhythm, normal S1 S2, no S3 or S4, no murmur, click or rub, no peripheral edema and peripheral pulses strong  ABDOMEN: soft, nontender, no hepatosplenomegaly, no masses and bowel sounds normal   (female): skin of vulva c/w lichen sclerosis et atrophicus, normal urethral meatus , vaginal mucosa pink, moist, well rugated, normal cervix, adnexae, and uterus without masses. and pap obtained  MS: no gross musculoskeletal defects noted, no edema  SKIN: no suspicious lesions or rashes  NEURO: Normal strength and tone, mentation intact and speech normal  PSYCH: mentation appears normal, affect normal/bright    Diagnostic Test Results:  Labs reviewed in Epic  Results for orders placed or performed in visit on 09/02/20 (from the past 24 hour(s))   Hemoglobin A1c   Result Value Ref Range    Hemoglobin A1C 5.4 0 - 5.6 %   XR Toe Right G/E 2 Views    Narrative    XR TOE RIGHT G/E 2 VIEWS 9/2/2020 8:15 AM     HISTORY: 5th toe injury 1 month ago, ongoing pain at MTP joint; Injury  of toe on right foot, initial encounter      Impression    IMPRESSION: Fifth toe proximal phalangeal diaphyseal minimally  displaced healing fracture. Callus formation at the fracture site  indicates that this is subacute.    OLU PRASAD MD " "      ASSESSMENT/PLAN:   1. Routine general medical examination at a health care facility     - Lipid panel reflex to direct LDL Non-fasting  - Hemoglobin A1c    2. Lichen sclerosis et atrophicus   controls symptoms fairly well  - clobetasol (TEMOVATE) 0.05 % external ointment; Apply  topically. Apply small amount twice weekly as needed for maintenance. May use daily for 1-2 weeks for exacerbation.  Dispense: 45 g; Refill: 3    3. Other allergic rhinitis     - fluticasone (FLONASE) 50 MCG/ACT nasal spray; USE 2 SPRAYS IN EACH       NOSTRIL DAILY  Dispense: 48 g; Refill: 1    4. Moderate persistent asthma without complication  Well controlled, using only once a day  - fluticasone-salmeterol (ADVAIR DISKUS) 250-50 MCG/DOSE inhaler; Inhale 1 puff into the lungs 2 times daily  Dispense: 3 Inhaler; Refill: 3  - OFFICE/OUTPT VISIT,EST,LEVL III    5. Cervical high risk HPV (human papillomavirus) test positive  Discussed HPV at length.  New partner the last four years, probably where the exposure came from  - HPV High Risk Types DNA Cervical  - Pap imaged thin layer screen with HPV - recommended age 30 - 65 years (select HPV order below)    6. Estrogen deficiency  Due for DEXA  - DX Hip/Pelvis/Spine; Future    7. Osteopenia, unspecified location     - DX Hip/Pelvis/Spine; Future    8. Screening for HIV without presence of risk factors     - HIV Antigen Antibody Combo    9. Injury of toe on right foot, initial encounter   healing fracture. Continue supportive cares, hard soled shoe.   - XR Toe Right G/E 2 Views  - OFFICE/OUTPT VISIT,EST,LEVL III    COUNSELING:  Reviewed preventive health counseling, as reflected in patient instructions       Regular exercise       Healthy diet/nutrition    Estimated body mass index is 29.76 kg/m  as calculated from the following:    Height as of this encounter: 1.619 m (5' 3.75\").    Weight as of this encounter: 78 kg (172 lb).    Weight management plan: Discussed healthy diet and exercise " guidelines    She reports that she quit smoking about 10 years ago. Her smoking use included cigarettes. She has a 28.00 pack-year smoking history. She has never used smokeless tobacco.      Counseling Resources:  ATP IV Guidelines  Pooled Cohorts Equation Calculator  Breast Cancer Risk Calculator  BRCA-Related Cancer Risk Assessment: FHS-7 Tool  FRAX Risk Assessment  ICSI Preventive Guidelines  Dietary Guidelines for Americans, 2010  [employer]'s MyPlate  ASA Prophylaxis  Lung CA Screening    Karol Yousif MD  [facility]

## 2020-09-03 ENCOUNTER — MYC MEDICAL ADVICE (OUTPATIENT)
Dept: FAMILY MEDICINE | Facility: CLINIC | Age: 55
End: 2020-09-03

## 2020-09-03 DIAGNOSIS — E28.39 ESTROGEN DEFICIENCY: ICD-10-CM

## 2020-09-03 DIAGNOSIS — E78.5 HYPERLIPIDEMIA LDL GOAL <160: ICD-10-CM

## 2020-09-03 DIAGNOSIS — Z13.6 CARDIOVASCULAR SCREENING; LDL GOAL LESS THAN 160: Primary | ICD-10-CM

## 2020-09-03 LAB — HIV 1+2 AB+HIV1 P24 AG SERPL QL IA: NONREACTIVE

## 2020-09-03 ASSESSMENT — ASTHMA QUESTIONNAIRES: ACT_TOTALSCORE: 25

## 2020-09-03 NOTE — RESULT ENCOUNTER NOTE
The American Heart Association and American College of Cardiology recommends statins for anyone who's 10 year risk exceeds 7.5%, your risk is 1.3%, therefore a statin (cholesterol lowering drug) is NOT recommended.    HgbA1c is good - no diabetes.    Your HDL or good cholesterol is good, but your LDL or bad cholesterol is quite high. If there is a family history of heart disease or you would like to further evaluate your risk, a CT coronary calcium score can be done which helps us determine if there are calcified plaques in the arteries. Otherwise work on lower saturated fat diet and increasing exercise and we'll recheck in a year.    Karol Yousif M.D.      The 10-year ASCVD risk score (Apryl ISSA Jr., et al., 2013) is: 1.3%    Values used to calculate the score:      Age: 55 years      Sex: Female      Is Non- : No      Diabetic: No      Tobacco smoker: No      Systolic Blood Pressure: 108 mmHg      Is BP treated: No      HDL Cholesterol: 83 mg/dL      Total Cholesterol: 287 mg/dL

## 2020-09-09 LAB
COPATH REPORT: NORMAL
PAP: NORMAL

## 2020-09-11 LAB
FINAL DIAGNOSIS: NORMAL
HPV HR 12 DNA CVX QL NAA+PROBE: NEGATIVE
HPV16 DNA SPEC QL NAA+PROBE: NEGATIVE
HPV18 DNA SPEC QL NAA+PROBE: NEGATIVE
SPECIMEN DESCRIPTION: NORMAL
SPECIMEN SOURCE CVX/VAG CYTO: NORMAL

## 2020-09-14 ENCOUNTER — PATIENT OUTREACH (OUTPATIENT)
Dept: FAMILY MEDICINE | Facility: CLINIC | Age: 55
End: 2020-09-14

## 2020-09-14 DIAGNOSIS — R87.810 CERVICAL HIGH RISK HPV (HUMAN PAPILLOMAVIRUS) TEST POSITIVE: ICD-10-CM

## 2020-09-15 NOTE — TELEPHONE ENCOUNTER
2003, 2004, 2005, 2006, 2007, 2009, 2012 NIL paps.  2015 NIL pap, Neg HPV.   6/7/19 NIL pap, + HR HPV (not 16 or 18). Plan cotest in 1 year.   9/2/20 NIL pap, Neg HPV. Plan cotest in 1 year due by 9/2/21.

## 2020-10-12 ENCOUNTER — VIRTUAL VISIT (OUTPATIENT)
Dept: FAMILY MEDICINE | Facility: OTHER | Age: 55
End: 2020-10-12

## 2020-10-12 ENCOUNTER — HOSPITAL ENCOUNTER (OUTPATIENT)
Dept: BONE DENSITY | Facility: CLINIC | Age: 55
End: 2020-10-12
Attending: FAMILY MEDICINE
Payer: COMMERCIAL

## 2020-10-12 ENCOUNTER — HOSPITAL ENCOUNTER (OUTPATIENT)
Dept: CT IMAGING | Facility: CLINIC | Age: 55
End: 2020-10-12
Attending: FAMILY MEDICINE
Payer: COMMERCIAL

## 2020-10-12 DIAGNOSIS — E78.5 HYPERLIPIDEMIA LDL GOAL <160: ICD-10-CM

## 2020-10-12 DIAGNOSIS — E78.5 HYPERLIPIDEMIA LDL GOAL <100: ICD-10-CM

## 2020-10-12 DIAGNOSIS — E78.5 HYPERLIPIDEMIA LDL GOAL <130: Primary | ICD-10-CM

## 2020-10-12 DIAGNOSIS — E28.39 ESTROGEN DEFICIENCY: ICD-10-CM

## 2020-10-12 PROCEDURE — 77080 DXA BONE DENSITY AXIAL: CPT

## 2020-10-12 PROCEDURE — 75571 CT HRT W/O DYE W/CA TEST: CPT

## 2020-10-12 PROCEDURE — 75571 CT HRT W/O DYE W/CA TEST: CPT | Mod: 26 | Performed by: INTERNAL MEDICINE

## 2020-10-12 RX ORDER — ATORVASTATIN CALCIUM 40 MG/1
40 TABLET, FILM COATED ORAL DAILY
Qty: 90 TABLET | Refills: 3 | Status: SHIPPED | OUTPATIENT
Start: 2020-10-12 | End: 2020-10-28

## 2020-10-12 NOTE — PROGRESS NOTES
"Date: 10/12/2020 17:45:03  Clinician: Remigio Vincent  Clinician NPI: [id number]  Patient: Francesca Rivera  Patient : 1965  Patient Address: [facility] [address], [place] [address]  Patient Phone: [phone]  Visit Protocol: URI  Patient Summary:  Francesca is a 55 year old ( : 1965 ) female who initiated a [facility] Visit for COVID-19 (Coronavirus) evaluation and screening. When asked the question \"Please sign me up to receive news, health information and promotions. \", Francesca responded \"No\".    Francesca states her symptoms started suddenly 10-13 days ago. After her symptoms started, they improved and then got worse again.   Her symptoms consist of a cough, malaise, a sore throat, and diarrhea. She is experiencing mild difficulty breathing with activities but can speak normally in full sentences.   Symptom details     Cough: Francesca coughs a few times an hour and her cough is not more bothersome at night. Phlegm comes into her throat when she coughs. She does not believe her cough is caused by post-nasal drip. The color of the phlegm is white.     Sore throat: Francesca reports having mild throat pain (1-3 on a 10 point pain scale), has exudate on her tonsils, and can swallow liquids. The lymph nodes in her neck are not enlarged. A rash has not appeared on the skin since the sore throat started.      Francesca denies having ear pain, headache, wheezing, fever, enlarged lymph nodes, nasal congestion, anosmia, vomiting, rhinitis, nausea, facial pain or pressure, myalgias, chills, teeth pain, and ageusia. She also denies taking antibiotic medication in the past month and having recent facial or sinus surgery in the past 60 days.   Precipitating events  Francesca is not sure if she has been exposed to someone with strep throat. She has not recently been exposed to someone with influenza. Francesca has not been in close contact with any high risk individuals.   Pertinent COVID-19 (Coronavirus) information  In the past " 14 days, Francesca has not worked in a congregate living setting.   She does not work or volunteer as healthcare worker or a  and does not work or volunteer in a healthcare facility.   Francesca also has not lived in a congregate living setting in the past 14 days. She does not live with a healthcare worker.   Francesca has had a close contact with a laboratory-confirmed COVID-19 patient within 14 days of symptom onset. Additional information about contact with COVID-19 (Coronavirus) patient as reported by the patient (free text): My boyfriend.  Close contact 9/30 - 10/4 and 10/9 - 10/11.  His house and my house.   Since December 2019, Francesca and has had upper respiratory infection (URI) or influenza-like illness. Has not been diagnosed with lab-confirmed COVID-19 test      Date(s) of previous URI or influenza-like illness (free-text): Mid-February to late March 2020     Symptoms Francesca experienced during previous URI or influenza-like illness as reported by the patient (free-text): cough, cold        Pertinent medical history  Francesca does not get yeast infections when she takes antibiotics.   Francesca does not need a return to work/school note.   Weight: 175 lbs   Francesca does not smoke or use smokeless tobacco.   Weight: 175 lbs    MEDICATIONS: Advair Diskus inhalation, albuterol sulfate inhalation, Flonase Allergy Relief nasal, omeprazole oral, ALLERGIES: NKDA  Clinician Response:  Dear Francesca,   Your symptoms show that you may have coronavirus (COVID-19). This illness can cause fever, cough and trouble breathing. Many people get a mild case and get better on their own. Some people can get very sick.  What should I do?  We would like to test you for this virus.   1. Please call [phone] to schedule your visit. Explain that you were referred by [facility] to have a COVID-19 test. Be ready to share your [facility] visit ID number.  The following will serve as your written order for this COVID Test, ordered  "by me, for the indication of suspected COVID [Z20.828]: The test will be ordered in [employer], our electronic health record, after you are scheduled. It will show as ordered and authorized by Jayden Hayes MD.  Order: COVID-19 (Coronavirus) PCR [facility]IC [facility].      2. When it's time for your COVID test:  Stay at least 6 feet away from others. (If someone will drive you to your test, stay in the backseat, as far away from the  as you can.)   Cover your mouth and nose with a mask, tissue or washcloth.  Go straight to the testing site. Don't make any stops on the way there or back.      3.Starting now: Stay home and away from others (self-isolate) until:   You've had no fever---and no medicine that reduces fever---for one full day (24 hours). And...   Your other symptoms have gotten better. For example, your cough or breathing has improved. And...   At least 10 days have passed since your symptoms started.       During this time, don't leave the house except for testing or medical care.   Stay in your own room, even for meals. Use your own bathroom if you can.   Stay away from others in your home. No hugging, kissing or shaking hands. No visitors.  Don't go to work, school or anywhere else.    Clean \"high touch\" surfaces often (doorknobs, counters, handles, etc.). Use a household cleaning spray or wipes. You'll find a full list of  on the EPA website: www.epa.gov/pesticide-registration/list-n-disinfectants-use-against-sars-cov-2.   Cover your mouth and nose with a mask, tissue or washcloth to avoid spreading germs.  Wash your hands and face often. Use soap and water.  Caregivers in these groups are at risk for severe illness due to COVID-19:  o People 65 years and older  o People who live in a nursing home or long-term care facility  o People with chronic disease (lung, heart, cancer, diabetes, kidney, liver, immunologic)  o People who have a weakened immune system, including those who:   " Are in cancer treatment  Take medicine that weakens the immune system, such as corticosteroids  Had a bone marrow or organ transplant  Have an immune deficiency  Have poorly controlled HIV or AIDS  Are obese (body mass index of 40 or higher)  Smoke regularly   o Caregivers should wear gloves while washing dishes, handling laundry and cleaning bedrooms and bathrooms.  o Use caution when washing and drying laundry: Don't shake dirty laundry, and use the warmest water setting that you can.  o For more tips, go to www.cdc.gov/coronavirus/2019-ncov/downloads/10Things.pdf.    4.Sign up for [employer]. We know it's scary to hear that you might have COVID-19. We want to track your symptoms to make sure you're okay over the next 2 weeks. Please look for an email from [employer]---this is a free, online program that we'll use to keep in touch. To sign up, follow the link in the email. Learn more at [email or web address]  How can I take care of myself?   Get lots of rest. Drink extra fluids (unless a doctor has told you not to).   Take Tylenol (acetaminophen) for fever or pain. If you have liver or kidney problems, ask your family doctor if it's okay to take Tylenol.   Adults can take either:    650 mg (two 325 mg pills) every 4 to 6 hours, or...   1,000 mg (two 500 mg pills) every 8 hours as needed.    Note: Don't take more than 3,000 mg in one day. Acetaminophen is found in many medicines (both prescribed and over-the-counter medicines). Read all labels to be sure you don't take too much.   For children, check the Tylenol bottle for the right dose. The dose is based on the child's age or weight.    If you have other health problems (like cancer, heart failure, an organ transplant or severe kidney disease): Call your specialty clinic if you don't feel better in the next 2 days.       Know when to call 911. Emergency warning signs include:    Trouble breathing or shortness of breath Pain or pressure in the  chest that doesn't go away Feeling confused like you haven't felt before, or not being able to wake up Bluish-colored lips or face.  Where can I get more information?   [facility] -- About COVID-19: [email or web address]   CDC -- What to Do If You're Sick: www.cdc.gov/coronavirus/2019-ncov/about/steps-when-sick.html   CDC -- Ending Home Isolation: www.cdc.gov/coronavirus/2019-ncov/hcp/disposition-in-home-patients.html   [facility] -- Caring for Someone: www.cdc.gov/coronavirus/2019-ncov/if-you-are-sick/care-for-someone.html   [facility] -- Interim Guidance for Hospital Discharge to Home: www.health.[facility].mn./diseases/coronavirus/hcp/hospdischarge.pdf   [facility] clinical trials (COVID-19 research studies): [email or web address].[email or web address]-clinical-trials    Below are the COVID-19 hotlines at the [facility] ([facility]). Interpreters are available.    For health questions: Call 073-897-4008 or 1-449.952.2107 (7 a.m. to 7 p.m.) For questions about schools and childcare: Call 101-818-1183 or 1-437.470.7931 (7 a.m. to 7 p.m.)    Diagnosis: Cough  Diagnosis ICD: R05

## 2020-10-13 ENCOUNTER — MYC MEDICAL ADVICE (OUTPATIENT)
Dept: FAMILY MEDICINE | Facility: CLINIC | Age: 55
End: 2020-10-13

## 2020-10-13 NOTE — RESULT ENCOUNTER NOTE
The bone density shows some  Osteoporosis.  You should be taking 4677-7456 mg of calcium with vit D.  You should be exercising regularly.    Given the change over time, particularly in your lumbar spine, I would recommend we start medications.  It would be good to go over the results and risks/benefits of medication.  You can schedule a virtual (telephone) visit to do this.  Some of the medications have side effects you should be aware of before starting.     Karol Yousif M.D.

## 2020-10-13 NOTE — RESULT ENCOUNTER NOTE
CT scan shows several small (<5 mm) nodules.  With your smoking history, I would recommend a follow up CT scan in 1 year.    No enlarged lymph nodes, etc.   You  Have a small hiatal hernia (stomach slides up into the chest).    Karol Yousif M.D.

## 2020-10-14 ENCOUNTER — AMBULATORY - HEALTHEAST (OUTPATIENT)
Dept: FAMILY MEDICINE | Facility: CLINIC | Age: 55
End: 2020-10-14

## 2020-10-14 DIAGNOSIS — Z20.822 SUSPECTED COVID-19 VIRUS INFECTION: ICD-10-CM

## 2020-10-28 ENCOUNTER — VIRTUAL VISIT (OUTPATIENT)
Dept: FAMILY MEDICINE | Facility: CLINIC | Age: 55
End: 2020-10-28
Payer: COMMERCIAL

## 2020-10-28 DIAGNOSIS — E78.5 HYPERLIPIDEMIA LDL GOAL <130: ICD-10-CM

## 2020-10-28 DIAGNOSIS — M81.0 AGE-RELATED OSTEOPOROSIS WITHOUT CURRENT PATHOLOGICAL FRACTURE: Primary | ICD-10-CM

## 2020-10-28 PROCEDURE — 99213 OFFICE O/P EST LOW 20 MIN: CPT | Mod: 95 | Performed by: FAMILY MEDICINE

## 2020-10-28 RX ORDER — IBANDRONATE SODIUM 150 MG/1
150 TABLET, FILM COATED ORAL
Qty: 3 TABLET | Refills: 3 | Status: SHIPPED | OUTPATIENT
Start: 2020-10-28 | End: 2021-10-08

## 2020-10-28 RX ORDER — ATORVASTATIN CALCIUM 40 MG/1
40 TABLET, FILM COATED ORAL DAILY
Qty: 90 TABLET | Refills: 3 | Status: SHIPPED | OUTPATIENT
Start: 2020-10-28 | End: 2021-02-08

## 2020-10-28 NOTE — PATIENT INSTRUCTIONS
Patient Education     Osteoporosis Medicines: Bisphosphonates  Depending on your needs, your healthcare provider may prescribe medicines to prevent or treat osteoporosis.    Bisphosphonates  Several medicines make up the class of drugs known as bisphosphonates. Bisphosphonates are the most common type of medicine used to help prevent and treat bone loss. Bisphosphonates are given in pill or injectable form as an IV infusion. They must be taken exactly as directed. Benefits may include:    Reducing bone loss    Increasing bone density in the hip and spine    Reducing risk of fractures in the spine, hip, and wrist  Side effects may include:    Heartburn    Nausea    Belly (abdominal) pain    Bone or muscle pain  Taking bisphosphonates pills  Always read medicine information closely. You should not take bisphosphonates if you currently have upper gastrointestinal disease. Certain bisphosphonates must be taken:    On an empty stomach.    With a full glass of water (8 oz.) first thing in the morning.    At least 30 minutes to 1 hour before any food, drink, or other medicines.    While sitting or standing. You should not lie down for at least 30 minutes after taking the medicine.  Newer medicines can be taken weekly or monthly. Talk with your healthcare provider to find out which one is right for you.   Date Last Reviewed: 5/1/2018 2000-2019 [employer]. [address] [place] [address]. All rights reserved. This information is not intended as a substitute for professional medical care. Always follow your healthcare professional's instructions.           Patient Education     Medicine for Cholesterol Control  Cholesterol is a waxy substance in your bloodstream. If there is too much of it in your blood, it can build up in the walls of your arteries. Over time, this buildup can lead to coronary disease. Coronary disease can put you at risk for a heart attack or stroke. It can also put you at risk  for disease of the arteries in your legs and other places in your body. Medicine can give you the extra help you need to control your cholesterol.    How medicine helps  Different kinds of medicines help with cholesterol levels. Some help lower your LDL (bad cholesterol). Some help raise your HDL (good cholesterol). Other medicines lower your triglyceride levels. And some do all three. It may take some time to find the right medicine for you. Taking medicine will be only one part of your cholesterol control plan. You will still need to eat right and get regular exercise.  Talk with your healthcare provider to find out your risks for having a heart attack. Your provider can tell you what goals to use to see if your treatment is working. These goals may vary based on your health issues or family history. Also ask your provider how often your cholesterol should be checked as part of your treatment plan. You may need to fast before getting your cholesterol checked.  Taking your medicine  It is important to:    Tell your healthcare provider about any other medicines you take. This includes over-the-counter medicines. It also includes vitamins and herbs.    Take your medicine exactly as directed. This helps make sure that it works as it should.    Don't skip a dose.    Don't stop taking it if you feel better.    Don't stop taking it when your cholesterol numbers improve.    Order your refill before your medicine runs out.  Side effects  Medicines can cause side effects. These often occur at the start of taking a new medicine. Side effects can include headache and upset stomach. Rarely you can have muscle aches. Tell your healthcare provider about any side effects you have.  When to call your healthcare provider  When taking your medicine, let your healthcare provider know if you have:    Yellowing of the whites of eyes    Blurred vision    Muscle aches    Trouble breathing   High-risk groups  Some people may need to take  medicines called statins to control their cholesterol. This is in addition to eating a healthy diet and getting regular exercise.  Statins can help you stay healthy. They can also help prevent a heart attack or stroke. You may need to take a statin if you are in one of these groups:    Adults who have had a heart attack or stroke. Or adults who have had peripheral vascular disease, a ministroke (transient ischemic attack), or stable or unstable angina. This group also includes people who have had a procedure to restore blood flow through a blocked artery. These procedures include percutaneous coronary intervention, angioplasty, stent, and open-heart bypass surgery.    Adults who have diabetes. Or adults who are at higher risk of having a heart attack or stroke and have an LDL cholesterol level of 70 to 189 mg/dL    Adults who are 21 years old or older and have an LDL cholesterol level of 190 mg/dL or higher.  If you are in a high-risk group, talk with your healthcare provider about your treatment goals. Make sure you understand why these goals are important, based on your own health history and your family history of heart disease or high cholesterol.  Make a plan to have regular cholesterol checks. You may need to fast before getting this test. Also ask your provider about any side effects your medicines may cause. Let your provider know about any side effects you have. You may need to take more than one medicine to reach the cholesterol goals that you and your provider decide on.  Date Last Reviewed: 10/1/2016    4282-5046 [employer]. [address] [place] [address], [place] [address]. All rights reserved. This information is not intended as a substitute for professional medical care. Always follow your healthcare professional's instructions.           Patient Education     Lifestyle Changes to Control Cholesterol  You can control your cholesterol through diet, exercise, weight management, quitting smoking,  stress management, and taking your medicines right. These things can also lower your risk for cardiovascular disease.    Eating healthy  Your healthcare provider will give you information on diet changes you may need to make. Your provider may recommend that you see a registered dietitian for help with diet changes. Changes may include:    Cutting back on the amount of fat and cholesterol in your meals    Eating less salt (sodium). This is especially important if you have high blood pressure.    Eating more fresh vegetables and fruits    Eating lean proteins such as fish, poultry, beans, and peas    Eating less red meat and processed meats    Using low-fat dairy products    Using vegetable and nut oils in limited amounts    Limiting how many sweets and processed foods like chips, cookies, and baked goods that you eat     Limiting how many sugar-sweetened beverages you drink    Limiting how often you eat out  Getting exercise  Regular exercise is a good way to help your body control cholesterol. Regular exercise can help in many ways. It can:    Raise your good cholesterol    Help lower your bad cholesterol    Let blood flow better through your body    Give more oxygen to your muscles and tissues    Help you manage your weight    Help your heart pump better    Lower your blood pressure  Your healthcare provider may recommend that you get more physical activity if you haven't been active. Your provider may recommend that you get moderate to vigorous physical activity for at least 40 minutes each day. You should do this for at least 3 to 4 days each week. A few examples of moderate to vigorous activity are:    Walking at a brisk pace. This is about 3 to 4 miles per hour.    Jogging or running    Swimming or water aerobics    Hiking    Dancing    Martial arts    Tennis    Riding a bicycle or stationary bike    Dancing  Managing your weight  If you are overweight or obese, your healthcare provider will work with you to  help you lose weight and lower your BMI (body mass index). Making diet changes and getting more physical activity can help. Changing your diet will help you lose weight more easily than adding exercise.  Quitting smoking  Smoking and other tobacco use can raise cholesterol and make it harder to control. Quitting is tough. But millions of people have given up tobacco for good. You can quit, too! Think about some of the reasons below to quit smoking. Do any of them make you think twice about your smoking habit?  Stop smoking because it:    Keeps your cholesterol high, even if you make all the other changes you re supposed to    Damages your body. It especially harms your heart, lungs, skin, and blood vessels.    Makes you more likely to have a heart attack (acute myocardial infarction), stroke, or cancer    Stains your teeth    Makes your skin, clothes, and breath smell bad    Costs a lot of money  Controlling stress   Learn ways to control stress. This will help you deal with stress in your home and work life. Controlling stress can greatly lower your risk of getting cardiovascular disease.  Making the most of medicines  Healthy eating and exercise are a good start to keeping your cholesterol down. But you may need some extra help from medicine. If your doctor prescribes medicine, be sure to take it exactly as directed. Remember:    Tell your healthcare provider about all other medicines you take. This includes vitamins and herbs.    Tell your healthcare provider if you have any side effects after starting to take a medicine. Examples of side effects to watch for include muscle aches, weakness, blurred vision, rust-colored urine, yellowing of eyes or skin (jaundice), and headache.    Don t skip a dose or stop taking your medicine because you feel better or because your cholesterol numbers go down. Never stop taking your medicine unless your healthcare provider has told you it s OK.    Ask your healthcare provider if  you have any questions about your medicines.  High risk groups  Some people may need to take medicines called statins to control their cholesterol. This is in addition to eating a healthy diet and getting regular exercise.  Statins can help you stay healthy. They can also help prevent a heart attack or stroke. You may need to take a statin if you are in one of these groups:    Adults who have had a heart attack or stroke. Or adults who have had peripheral vascular disease, a ministroke (transient ischemic attack), or stable or unstable angina. This group also includes people who have had a procedure to restore blood flow through a blocked artery. These procedures include percutaneous coronary intervention, angioplasty, stent, and open-heart bypass surgery.    Adults who have diabetes. Or adults who are at higher risk of having a heart attack or stroke and have an LDL cholesterol level of 70 to 189 mg/dL    Adults who are 21 years old or older and have an LDL cholesterol level of 190 mg/dL or higher.  If you are in a high-risk group, talk with your healthcare provider about your treatment goals. Make sure you understand why these goals are important, based on your own health history and your family history of heart disease or high cholesterol.  Make a plan to have regular cholesterol checks. You may need to fast before getting this test. Also ask your provider about any side effects your medicines may cause. Let your provider know about any side effects you have. You may need to take more than one medicine to reach the cholesterol goals that you and your provider decide on.  Date Last Reviewed: 10/1/2016    7268-4154 [employer]. [address] [place] [address], [place] [address]. All rights reserved. This information is not intended as a substitute for professional medical care. Always follow your healthcare professional's instructions.

## 2020-10-28 NOTE — PROGRESS NOTES
"Osteoporosis Risk Score/FRAX score     http://[email or web address].ac.uk/FRAX/  Risk of Hip Fracture: 1.6 (Significant if >3%)  Risk of Overall Fracture: 13 (Significant if >20%)    DX HIP/PELVIS/SPINE  10/12/2020 10:20 AM     HISTORY:  Estrogen deficiency     FINDINGS: This DEXA scan was performed using a Lunar Prodigy scanner.   DEXA results are reported according to T-score.  The T-score is the  standard deviation from the peak bone mass in a normal young adult  population.  In accordance with the ISCD (International Society of  Clinical Densitometry), the lower of the total proximal femur vs  femoral neck T-score is reported.  Osteopenia is defined as a T-score  of -1.0 to -2.5.  Osteoporosis is defined as a T-score of less than  -2.5.     T-SCORES:  Lumbar Spine L1-L4 T-score: -2.7     Left Hip (Neck) T-score: -2.5  Right Hip (Neck) T-score: 2.1     Hip lowest neck BMD: 0.696 gm/cm2.     PERCENT CHANGE since 5/17/2018:  Lumbar Spine: Decreased significantly by -5.7%  Femurs: Not significantly changed, 1.2%                                                                      IMPRESSION: Lumbar spine and left hip osteoporosis. Right hip  osteopenia.   Francesca Rivera is a 55 year old female who is being evaluated via a billable video visit.      The patient has been notified of following:     \"This video visit will be conducted via a call between you and your physician/provider. We have found that certain health care needs can be provided without the need for an in-person physical exam.  This service lets us provide the care you need with a video conversation.  If a prescription is necessary we can send it directly to your pharmacy.  If lab work is needed we can place an order for that and you can then stop by our lab to have the test done at a later time.    Video visits are billed at different rates depending on your insurance coverage.  Please reach out to your insurance provider with any questions.    If during the " "course of the call the physician/provider feels a video visit is not appropriate, you will not be charged for this service.\"    Patient has given verbal consent for Video visit? Yes  How would you like to obtain your AVS? MyChart  If you are dropped from the video visit, the video invite should be resent to: Send to e-mail at: [email or web address]  Will anyone else be joining your video visit? No      Subjective     Francesca Rivera is a 55 year old female who presents today via video visit for the following health issues:    HPI     Scheduled visit to discuss recent results- see DEXA results above.  I'm concerned given the family history of osteoporosis and the decline in bone density over the past two years.    I am also concerned given her relatively young age.      she has questions about her cholesterol and medication, will get her some information on low cholesterol diet.     Video Start Time: 1448        Review of Systems   Constitutional, HEENT, cardiovascular, pulmonary, gi and gu systems are negative, except as otherwise noted.      Objective           Vitals:  No vitals were obtained today due to virtual visit.    Physical Exam     GENERAL: Healthy, alert and no distress  EYES: Eyes grossly normal to inspection.  No discharge or erythema, or obvious scleral/conjunctival abnormalities.  RESP: No audible wheeze, cough, or visible cyanosis.  No visible retractions or increased work of breathing.    SKIN: Visible skin clear. No significant rash, abnormal pigmentation or lesions.  NEURO: Cranial nerves grossly intact.  Mentation and speech appropriate for age.  PSYCH: Mentation appears normal, affect normal/bright, judgement and insight intact, normal speech and appearance well-groomed.    Osteoporosis Risk Score/FRAX score     http://[email or web address].ac.uk/FRAX/  Risk of Hip Fracture: 1.6 (Significant if >3%)  Risk of Overall Fracture: 13 (Significant if >20%)      DEXA, CT coronary calcium scan reviewed    "     Assessment & Plan     Age-related osteoporosis without current pathological fracture   risks, benefits and alternatives discussed   Discussed the importance of Calcium and vitamin D intake, weight bearing exercise, avoidance of smoking and bone density monitoring (if appropriate).  Repeat dexa in 2 years  - IBANdronate (BONIVA) 150 MG tablet; Take 1 tablet (150 mg) by mouth every 30 days    Hyperlipidemia LDL goal <130  Changed prescription to her mail order pharmacy.    - atorvastatin (LIPITOR) 40 MG tablet; Take 1 tablet (40 mg) by mouth daily            No follow-ups on file.    Karol Yousif MD  [facility]      Video-Visit Details    Type of service:  Video Visit    Video End Time:3:02 PM    Originating Location (pt. Location): Home    Distant Location (provider location):  [facility]     Platform used for Video Visit: Elisabeth

## 2021-02-05 ENCOUNTER — TELEPHONE (OUTPATIENT)
Dept: FAMILY MEDICINE | Facility: CLINIC | Age: 56
End: 2021-02-05

## 2021-02-05 DIAGNOSIS — E78.5 HYPERLIPIDEMIA LDL GOAL <130: ICD-10-CM

## 2021-02-05 NOTE — TELEPHONE ENCOUNTER
Left message on personal answering machine to return call. Direct line provided. Need to find out what pharmacy she would like 7 days sent to.  Juani Jacob RN

## 2021-02-05 NOTE — TELEPHONE ENCOUNTER
Reason for Call:  Other prescription    Detailed comments: pt is out of town and and is staying an additional 7 days so needs 7 days worth of the omeprazole and atorvastatin.    Phone Number Patient can be reached at: Home number on file [phone] (home)    Best Time: any    Can we leave a detailed message on this number? YES    Call taken on 2/5/2021 at 1:26 PM by Estela Azul

## 2021-02-08 RX ORDER — ATORVASTATIN CALCIUM 40 MG/1
40 TABLET, FILM COATED ORAL DAILY
Qty: 7 TABLET | Refills: 0 | Status: SHIPPED | OUTPATIENT
Start: 2021-02-08 | End: 2021-10-08

## 2021-02-08 RX ORDER — OMEPRAZOLE 40 MG/1
40 CAPSULE, DELAYED RELEASE ORAL DAILY
Qty: 7 CAPSULE | Refills: 0 | Status: SHIPPED | OUTPATIENT
Start: 2021-02-08 | End: 2021-07-08

## 2021-03-18 ENCOUNTER — IMMUNIZATION (OUTPATIENT)
Dept: FAMILY MEDICINE | Facility: CLINIC | Age: 56
End: 2021-03-18
Payer: COMMERCIAL

## 2021-03-18 PROCEDURE — 91301 PR COVID VAC MODERNA 100 MCG/0.5 ML IM: CPT

## 2021-03-18 PROCEDURE — 0011A PR COVID VAC MODERNA 100 MCG/0.5 ML IM: CPT

## 2021-04-12 DIAGNOSIS — J30.89 OTHER ALLERGIC RHINITIS: ICD-10-CM

## 2021-04-12 NOTE — TELEPHONE ENCOUNTER
FLUTICASONE  SPR [id number]  Last Written Prescription Date:  9/2/21  Last Fill Quantity: 48g,  # refills: 1   Last office visit: 10/28/2020 with prescribing provider:  kim   Future Office Visit:

## 2021-04-13 RX ORDER — FLUTICASONE PROPIONATE 50 MCG
SPRAY, SUSPENSION (ML) NASAL
Qty: 48 G | Refills: 1 | Status: SHIPPED | OUTPATIENT
Start: 2021-04-13 | End: 2022-01-11

## 2021-04-15 ENCOUNTER — IMMUNIZATION (OUTPATIENT)
Dept: FAMILY MEDICINE | Facility: CLINIC | Age: 56
End: 2021-04-15
Attending: FAMILY MEDICINE
Payer: COMMERCIAL

## 2021-04-15 PROCEDURE — 0012A PR COVID VAC MODERNA 100 MCG/0.5 ML IM: CPT

## 2021-04-15 PROCEDURE — 91301 PR COVID VAC MODERNA 100 MCG/0.5 ML IM: CPT

## 2021-07-08 ENCOUNTER — MYC MEDICAL ADVICE (OUTPATIENT)
Dept: FAMILY MEDICINE | Facility: CLINIC | Age: 56
End: 2021-07-08

## 2021-07-08 DIAGNOSIS — E78.5 HYPERLIPIDEMIA LDL GOAL <130: ICD-10-CM

## 2021-07-08 RX ORDER — OMEPRAZOLE 40 MG/1
40 CAPSULE, DELAYED RELEASE ORAL DAILY
Qty: 90 CAPSULE | Refills: 0 | Status: SHIPPED | OUTPATIENT
Start: 2021-07-08 | End: 2021-10-08

## 2021-07-08 NOTE — TELEPHONE ENCOUNTER
Routing refill request to provider for review/approval because:  Patient has history of osteoporosis.    Radha Soliz RN

## 2021-07-24 ENCOUNTER — HEALTH MAINTENANCE LETTER (OUTPATIENT)
Age: 56
End: 2021-07-24

## 2021-08-19 ENCOUNTER — PATIENT OUTREACH (OUTPATIENT)
Dept: FAMILY MEDICINE | Facility: CLINIC | Age: 56
End: 2021-08-19

## 2021-09-18 ENCOUNTER — HEALTH MAINTENANCE LETTER (OUTPATIENT)
Age: 56
End: 2021-09-18

## 2021-09-20 ENCOUNTER — ANCILLARY PROCEDURE (OUTPATIENT)
Dept: MAMMOGRAPHY | Facility: CLINIC | Age: 56
End: 2021-09-20
Attending: FAMILY MEDICINE
Payer: COMMERCIAL

## 2021-09-20 DIAGNOSIS — Z12.31 VISIT FOR SCREENING MAMMOGRAM: ICD-10-CM

## 2021-09-20 PROCEDURE — 77067 SCR MAMMO BI INCL CAD: CPT | Mod: TC | Performed by: RADIOLOGY

## 2021-10-07 ENCOUNTER — HOSPITAL ENCOUNTER (OUTPATIENT)
Dept: MAMMOGRAPHY | Facility: CLINIC | Age: 56
End: 2021-10-07
Attending: FAMILY MEDICINE
Payer: COMMERCIAL

## 2021-10-07 DIAGNOSIS — R92.8 ABNORMAL MAMMOGRAM: ICD-10-CM

## 2021-10-07 PROCEDURE — 77061 BREAST TOMOSYNTHESIS UNI: CPT | Mod: RT

## 2021-10-07 NOTE — LETTER
Francesca Rivera  [address] [place] [address]              October 7, 2021  Date of Exam:     Dear Francesca:    Thank you for your recent visit.  Breast Imaging Result: We are pleased to inform you that the results of your recent breast imaging show no evidence of malignancy (cancer).    If you are experiencing any breast problems such as a lump or localized pain we request that you discuss this with your health care team if you haven t already done so, as additional testing may be necessary.    As you know, early detection of cancer is very important. Although not all cancers are found through breast imaging, it is the most accurate method for early detection. A thorough examination includes a combination of breast imaging, physical examination and breast self-examination. Currently the American College of Radiology and the Society of Breast Imaging recommend breast imaging beginning at the age of 40.    A report of your breast imaging results was sent to: Karol Yousif    Your breast imaging will become part of your medical file here at [facility] for at least 10 years. You are responsible for informing any new health care team or breast imaging facility of the date and location of this examination.    We appreciate the opportunity to participate in your health care.    Sincerely,  Gurvinder Bergman MD  [facility]

## 2021-10-08 ENCOUNTER — OFFICE VISIT (OUTPATIENT)
Dept: FAMILY MEDICINE | Facility: CLINIC | Age: 56
End: 2021-10-08
Payer: COMMERCIAL

## 2021-10-08 VITALS
OXYGEN SATURATION: 99 % | HEIGHT: 64 IN | WEIGHT: 169 LBS | HEART RATE: 77 BPM | BODY MASS INDEX: 28.85 KG/M2 | TEMPERATURE: 98.7 F | RESPIRATION RATE: 16 BRPM | DIASTOLIC BLOOD PRESSURE: 58 MMHG | SYSTOLIC BLOOD PRESSURE: 104 MMHG

## 2021-10-08 DIAGNOSIS — J45.40 MODERATE PERSISTENT ASTHMA WITHOUT COMPLICATION: ICD-10-CM

## 2021-10-08 DIAGNOSIS — R87.810 CERVICAL HIGH RISK HPV (HUMAN PAPILLOMAVIRUS) TEST POSITIVE: ICD-10-CM

## 2021-10-08 DIAGNOSIS — E78.5 HYPERLIPIDEMIA LDL GOAL <130: ICD-10-CM

## 2021-10-08 DIAGNOSIS — Z00.00 ROUTINE GENERAL MEDICAL EXAMINATION AT A HEALTH CARE FACILITY: Primary | ICD-10-CM

## 2021-10-08 DIAGNOSIS — Z23 NEED FOR PROPHYLACTIC VACCINATION AND INOCULATION AGAINST INFLUENZA: ICD-10-CM

## 2021-10-08 DIAGNOSIS — L94.0 CIRCUMSCRIBED SCLERODERMA: ICD-10-CM

## 2021-10-08 DIAGNOSIS — N95.2 POST-MENOPAUSAL ATROPHIC VAGINITIS: ICD-10-CM

## 2021-10-08 DIAGNOSIS — Z80.0 FAMILY HISTORY OF COLON CANCER: ICD-10-CM

## 2021-10-08 DIAGNOSIS — M81.0 AGE-RELATED OSTEOPOROSIS WITHOUT CURRENT PATHOLOGICAL FRACTURE: ICD-10-CM

## 2021-10-08 LAB
CHOLEST SERPL-MCNC: 148 MG/DL
FASTING STATUS PATIENT QL REPORTED: NO
FASTING STATUS PATIENT QL REPORTED: NO
GLUCOSE BLD-MCNC: 93 MG/DL (ref 70–99)
HDLC SERPL-MCNC: 74 MG/DL
LDLC SERPL CALC-MCNC: 59 MG/DL
NONHDLC SERPL-MCNC: 74 MG/DL
TRIGL SERPL-MCNC: 73 MG/DL

## 2021-10-08 PROCEDURE — 80061 LIPID PANEL: CPT | Performed by: FAMILY MEDICINE

## 2021-10-08 PROCEDURE — 90471 IMMUNIZATION ADMIN: CPT | Performed by: FAMILY MEDICINE

## 2021-10-08 PROCEDURE — 87624 HPV HI-RISK TYP POOLED RSLT: CPT | Performed by: FAMILY MEDICINE

## 2021-10-08 PROCEDURE — 82947 ASSAY GLUCOSE BLOOD QUANT: CPT | Performed by: FAMILY MEDICINE

## 2021-10-08 PROCEDURE — G0145 SCR C/V CYTO,THINLAYER,RESCR: HCPCS | Performed by: FAMILY MEDICINE

## 2021-10-08 PROCEDURE — 90682 RIV4 VACC RECOMBINANT DNA IM: CPT | Performed by: FAMILY MEDICINE

## 2021-10-08 PROCEDURE — 36415 COLL VENOUS BLD VENIPUNCTURE: CPT | Performed by: FAMILY MEDICINE

## 2021-10-08 PROCEDURE — 99396 PREV VISIT EST AGE 40-64: CPT | Mod: 25 | Performed by: FAMILY MEDICINE

## 2021-10-08 RX ORDER — IBANDRONATE SODIUM 150 MG/1
150 TABLET, FILM COATED ORAL
Qty: 3 TABLET | Refills: 3 | Status: SHIPPED | OUTPATIENT
Start: 2021-10-08 | End: 2022-10-06

## 2021-10-08 RX ORDER — CLOBETASOL PROPIONATE 0.5 MG/G
OINTMENT TOPICAL
Qty: 45 G | Refills: 3 | Status: SHIPPED | OUTPATIENT
Start: 2021-10-08 | End: 2022-10-06

## 2021-10-08 RX ORDER — ATORVASTATIN CALCIUM 40 MG/1
40 TABLET, FILM COATED ORAL DAILY
Qty: 90 TABLET | Refills: 3 | Status: SHIPPED | OUTPATIENT
Start: 2021-10-08 | End: 2022-10-06

## 2021-10-08 RX ORDER — ESTRADIOL 0.5 MG/1
TABLET ORAL
Qty: 24 TABLET | Refills: 3 | Status: SHIPPED | OUTPATIENT
Start: 2021-10-08 | End: 2022-10-06

## 2021-10-08 RX ORDER — OMEPRAZOLE 40 MG/1
40 CAPSULE, DELAYED RELEASE ORAL DAILY
Qty: 90 CAPSULE | Refills: 0 | Status: SHIPPED | OUTPATIENT
Start: 2021-10-08 | End: 2022-01-11

## 2021-10-08 ASSESSMENT — ENCOUNTER SYMPTOMS
JOINT SWELLING: 0
FEVER: 0
NAUSEA: 0
FREQUENCY: 0
HEMATURIA: 0
BREAST MASS: 0
HEMATOCHEZIA: 0
PALPITATIONS: 0
DIZZINESS: 0
CHILLS: 0
WEAKNESS: 0
MYALGIAS: 0
COUGH: 0
PARESTHESIAS: 0
CONSTIPATION: 0
DIARRHEA: 0
EYE PAIN: 0
DYSURIA: 0
HEARTBURN: 0
SHORTNESS OF BREATH: 0
ARTHRALGIAS: 0
HEADACHES: 0
SORE THROAT: 0
ABDOMINAL PAIN: 0
NERVOUS/ANXIOUS: 0

## 2021-10-08 ASSESSMENT — PAIN SCALES - GENERAL: PAINLEVEL: NO PAIN (0)

## 2021-10-08 ASSESSMENT — MIFFLIN-ST. JEOR: SCORE: 1342.61

## 2021-10-08 NOTE — PROGRESS NOTES
SUBJECTIVE:   CC: Francesca Rivera is an 56 year old woman who presents for preventive health visit.       Patient has been advised of split billing requirements and indicates understanding: Yes   Chief Complaint   Patient presents with     Physical     Imm/Inj     Flu Shot     Health Maintenance     Provider discuss colon screeing with patient       Healthy Habits:     Getting at least 3 servings of Calcium per day:  Yes    Bi-annual eye exam:  Yes    Dental care twice a year:  Yes    Sleep apnea or symptoms of sleep apnea:  None    Diet:  Regular (no restrictions)    Frequency of exercise:  6-7 days/week    Duration of exercise:  15-30 minutes    Taking medications regularly:  Yes    Medication side effects:  None    PHQ-2 Total Score: 0    Additional concerns today:  No  Imm/Inj  Pertinent negatives include no abdominal pain, arthralgias, chest pain, chills, congestion, coughing, fever, headaches, joint swelling, myalgias, nausea, rash, sore throat or weakness.           Hyperlipidemia Follow-Up      Are you regularly taking any medication or supplement to lower your cholesterol?   Yes- lipitor    Are you having muscle aches or other side effects that you think could be caused by your cholesterol lowering medication?  No    Asthma Follow-Up    Was ACT completed today?    Yes    ACT Total Scores 10/8/2021   ACT TOTAL SCORE -   ASTHMA ER VISITS -   ASTHMA HOSPITALIZATIONS -   ACT TOTAL SCORE (Goal Greater than or Equal to 20) 25   In the past 12 months, how many times did you visit the emergency room for your asthma without being admitted to the hospital? 0   In the past 12 months, how many times were you hospitalized overnight because of your asthma? 0          How many days per week do you miss taking your asthma controller medication?  0    Please describe any recent triggers for your asthma: upper respiratory infections and humidity    Have you had any Emergency Room Visits, Urgent Care Visits, or Hospital  Admissions since your last office visit?  No      Today's PHQ-2 Score:   PHQ-2 (  [employer]) 10/8/2021   Q1: Little interest or pleasure in doing things 0   Q2: Feeling down, depressed or hopeless 0   PHQ-2 Score 0   Q1: Little interest or pleasure in doing things Not at all   Q2: Feeling down, depressed or hopeless Not at all   PHQ-2 Score 0       Abuse: Current or Past (Physical, Sexual or Emotional) - No  Do you feel safe in your environment? Yes    Have you ever done Advance Care Planning? (For example, a Health Directive, POLST, or a discussion with a medical provider or your loved ones about your wishes): No, advance care planning information given to patient to review.  Patient plans to discuss their wishes with loved ones or provider.      Social History     Tobacco Use     Smoking status: Former Smoker     Packs/day: 1.00     Years: 28.00     Pack years: 28.00     Types: Cigarettes     Quit date: 2010     Years since quittin.3     Smokeless tobacco: Never Used   Substance Use Topics     Alcohol use: Yes     Comment: occ     If you drink alcohol do you typically have >3 drinks per day or >7 drinks per week? No    Alcohol Use 10/8/2021   Prescreen: >3 drinks/day or >7 drinks/week? No   Prescreen: >3 drinks/day or >7 drinks/week? -   No flowsheet data found.    Reviewed orders with patient.  Reviewed health maintenance and updated orders accordingly - Yes  Lab work is in process    Breast Cancer Screening:    FHS-7:   Breast CA Risk Assessment (FHS-7) 2021 10/8/2021   Did any of your first-degree relatives have breast or ovarian cancer? No Yes   Did any of your relatives have bilateral breast cancer? No Unknown   Did any man in your family have breast cancer? No No   Did any woman in your family have breast and ovarian cancer? No Yes   Did any woman in your family have breast cancer before age 50 y? No No   Do you have 2 or more relatives with breast and/or ovarian cancer? No No   Do you have 2  "or more relatives with breast and/or bowel cancer? Yes No     click delete button to remove this line now  Mammogram Screening: Recommended mammography every 1-2 years with patient discussion and risk factor consideration  Pertinent mammograms are reviewed under the imaging tab.    History of abnormal Pap smear: YES - updated in Problem List and Health Maintenance accordingly  PAP / HPV Latest Ref Rng & Units 9/2/2020 6/7/2019 4/13/2015   PAP (Historical) - NIL NIL NIL   HPV16 NEG:Negative Negative Negative Negative   HPV18 NEG:Negative Negative Negative Negative   HRHPV NEG:Negative Negative Positive(A) Negative     Reviewed and updated as needed this visit by clinical staff  Tobacco  Allergies  Meds              Reviewed and updated as needed this visit by Provider                    Review of Systems   Constitutional: Negative for chills and fever.   HENT: Negative for congestion, ear pain, hearing loss and sore throat.    Eyes: Negative for pain and visual disturbance.   Respiratory: Negative for cough and shortness of breath.    Cardiovascular: Negative for chest pain, palpitations and peripheral edema.   Gastrointestinal: Negative for abdominal pain, constipation, diarrhea, heartburn, hematochezia and nausea.   Breasts:  Negative for tenderness, breast mass and discharge.   Genitourinary: Negative for dysuria, frequency, genital sores, hematuria, pelvic pain, urgency, vaginal bleeding and vaginal discharge.   Musculoskeletal: Negative for arthralgias, joint swelling and myalgias.   Skin: Negative for rash.   Neurological: Negative for dizziness, weakness, headaches and paresthesias.   Psychiatric/Behavioral: Negative for mood changes. The patient is not nervous/anxious.           OBJECTIVE:   /58   Pulse 77   Temp 98.7  F (37.1  C) (Tympanic)   Resp 16   Ht 1.619 m (5' 3.75\")   Wt 76.7 kg (169 lb)   LMP 08/15/2006   SpO2 99%   Breastfeeding No   BMI 29.24 kg/m    Physical Exam  GENERAL: " healthy, alert and no distress  NECK: no adenopathy, no asymmetry, masses, or scars and thyroid normal to palpation  RESP: lungs clear to auscultation - no rales, rhonchi or wheezes  CV: regular rate and rhythm, normal S1 S2, no S3 or S4, no murmur, click or rub, no peripheral edema and peripheral pulses strong  ABDOMEN: soft, nontender, no hepatosplenomegaly, no masses and bowel sounds normal   (female): normal female external genitalia, normal urethral meatus , vaginal mucosa pink, moist, well rugated and normal cervix, adnexae, and uterus without masses.  MS: no gross musculoskeletal defects noted, no edema  NEURO: Normal strength and tone, mentation intact and speech normal    Diagnostic Test Results:  Labs reviewed in Epic    ASSESSMENT/PLAN:   (Z00.00) Routine general medical examination at a health care facility  (primary encounter diagnosis)  Comment:    Plan: Lipid panel reflex to direct LDL Non-fasting,         GLUCOSE             (E78.5) Hyperlipidemia LDL goal <130  Comment:    Plan: atorvastatin (LIPITOR) 40 MG tablet, omeprazole        (PRILOSEC) 40 MG DR capsule, Lipid panel reflex        to direct LDL Non-fasting             (L94.0) Lichen sclerosis et atrophicus  Comment:    Plan: clobetasol (TEMOVATE) 0.05 % external ointment             (N95.2) Post-menopausal atrophic vaginitis  Comment:    Plan: estradiol (ESTRACE) 0.5 MG tablet             (J45.40) Moderate persistent asthma without complication  Comment:  Well controlled  Plan: fluticasone-salmeterol (ADVAIR DISKUS) 250-50         MCG/DOSE inhaler             (M81.0) Age-related osteoporosis without current pathological fracture  Comment: repeat DEXA in 2 years  Plan: IBANdronate (BONIVA) 150 MG tablet             (R87.810) Cervical high risk HPV (human papillomavirus) test positive  Comment:    Plan: Pap screen with HPV - recommended age 30 - 65         years             (Z23) Need for prophylactic vaccination and inoculation against  "influenza  Comment:    Plan: INFLUENZA QUAD, RECOMBINANT, P-FREE (RIV4)         (FLUBLOK)             (Z80.0) Family history of colon cancer  Comment:    Plan: Adult Gastro Ref - Procedure Only               Patient has been advised of split billing requirements and indicates understanding: Yes  COUNSELING:  Reviewed preventive health counseling, as reflected in patient instructions       Regular exercise       Healthy diet/nutrition    Estimated body mass index is 29.24 kg/m  as calculated from the following:    Height as of this encounter: 1.619 m (5' 3.75\").    Weight as of this encounter: 76.7 kg (169 lb).    Weight management plan: Discussed healthy diet and exercise guidelines    She reports that she quit smoking about 11 years ago. Her smoking use included cigarettes. She has a 28.00 pack-year smoking history. She has never used smokeless tobacco.      Counseling Resources:  ATP IV Guidelines  Pooled Cohorts Equation Calculator  Breast Cancer Risk Calculator  BRCA-Related Cancer Risk Assessment: FHS-7 Tool  FRAX Risk Assessment  ICSI Preventive Guidelines  Dietary Guidelines for Americans, 2010  USDA's MyPlate  ASA Prophylaxis  Lung CA Screening    Karol Yousif MD  [facility]  "

## 2021-10-08 NOTE — PATIENT INSTRUCTIONS
Our Clinic hours are:  Mondays    7:20 am - 7 pm  Tues - Fri  7:20 am - 5 pm    Clinic Phone: [phone]    The clinic lab opens at 7:30 am Mon - Fri and appointments are required.    [facility]. [phone]  Monday  8 am - 7pm  Tues - Fri 8 am - 5:30 pm         Preventive Health Recommendations  Female Ages 50 - 64    Yearly exam: See your health care provider every year in order to  o Review health changes.   o Discuss preventive care.    o Review your medicines if your doctor has prescribed any.      Get a Pap test every three years (unless you have an abnormal result and your provider advises testing more often).    If you get Pap tests with HPV test, you only need to test every 5 years, unless you have an abnormal result.     You do not need a Pap test if your uterus was removed (hysterectomy) and you have not had cancer.    You should be tested each year for STDs (sexually transmitted diseases) if you're at risk.     Have a mammogram every 1 to 2 years.    Have a colonoscopy at age 50, or have a yearly FIT test (stool test). These exams screen for colon cancer.      Have a cholesterol test every 5 years, or more often if advised.    Have a diabetes test (fasting glucose) every three years. If you are at risk for diabetes, you should have this test more often.     If you are at risk for osteoporosis (brittle bone disease), think about having a bone density scan (DEXA).    Shots: Get a flu shot each year. Get a tetanus shot every 10 years.    Nutrition:     Eat at least 5 servings of fruits and vegetables each day.    Eat whole-grain bread, whole-wheat pasta and brown rice instead of white grains and rice.    Get adequate Calcium and Vitamin D.     Lifestyle    Exercise at least 150 minutes a week (30 minutes a day, 5 days a week). This will help you control your weight and prevent disease.    Limit alcohol to one drink per day.    No smoking.     Wear sunscreen to prevent skin  cancer.     See your dentist every six months for an exam and cleaning.    See your eye doctor every 1 to 2 years.

## 2021-10-09 ASSESSMENT — ASTHMA QUESTIONNAIRES: ACT_TOTALSCORE: 25

## 2021-10-09 NOTE — RESULT ENCOUNTER NOTE
Francesca,    All of the labs were normal or acceptable.    Please contact my office if you have questions.    Karol Yousif M.D.

## 2021-10-12 DIAGNOSIS — Z11.59 ENCOUNTER FOR SCREENING FOR OTHER VIRAL DISEASES: ICD-10-CM

## 2021-10-12 LAB
BKR LAB AP GYN ADEQUACY: NORMAL
BKR LAB AP GYN INTERPRETATION: NORMAL
BKR LAB AP HPV REFLEX: NORMAL
BKR LAB AP PREVIOUS ABNORMAL: NORMAL
PATH REPORT.COMMENTS IMP SPEC: NORMAL
PATH REPORT.RELEVANT HX SPEC: NORMAL

## 2021-10-13 LAB
HUMAN PAPILLOMA VIRUS 16 DNA: NEGATIVE
HUMAN PAPILLOMA VIRUS 18 DNA: NEGATIVE
HUMAN PAPILLOMA VIRUS FINAL DIAGNOSIS: NORMAL
HUMAN PAPILLOMA VIRUS OTHER HR: NEGATIVE

## 2021-10-27 ENCOUNTER — IMMUNIZATION (OUTPATIENT)
Dept: NURSING | Facility: CLINIC | Age: 56
End: 2021-10-27
Payer: COMMERCIAL

## 2021-10-27 PROCEDURE — 0013A PR COVID VAC MODERNA 100 MCG/0.5 ML IM: CPT

## 2021-10-27 PROCEDURE — 91301 PR COVID VAC MODERNA 100 MCG/0.5 ML IM: CPT

## 2021-10-29 RX ORDER — MULTIPLE VITAMINS W/ MINERALS TAB 9MG-400MCG
1 TAB ORAL DAILY
COMMUNITY

## 2021-11-01 ENCOUNTER — LAB (OUTPATIENT)
Dept: LAB | Facility: CLINIC | Age: 56
End: 2021-11-01
Payer: COMMERCIAL

## 2021-11-01 DIAGNOSIS — Z11.59 ENCOUNTER FOR SCREENING FOR OTHER VIRAL DISEASES: ICD-10-CM

## 2021-11-01 PROCEDURE — U0003 INFECTIOUS AGENT DETECTION BY NUCLEIC ACID (DNA OR RNA); SEVERE ACUTE RESPIRATORY SYNDROME CORONAVIRUS 2 (SARS-COV-2) (CORONAVIRUS DISEASE [COVID-19]), AMPLIFIED PROBE TECHNIQUE, MAKING USE OF HIGH THROUGHPUT TECHNOLOGIES AS DESCRIBED BY CMS-2020-01-R: HCPCS

## 2021-11-01 PROCEDURE — U0005 INFEC AGEN DETEC AMPLI PROBE: HCPCS

## 2021-11-02 ENCOUNTER — ANESTHESIA EVENT (OUTPATIENT)
Dept: GASTROENTEROLOGY | Facility: CLINIC | Age: 56
End: 2021-11-02
Payer: COMMERCIAL

## 2021-11-02 LAB — SARS-COV-2 RNA RESP QL NAA+PROBE: NEGATIVE

## 2021-11-02 ASSESSMENT — LIFESTYLE VARIABLES: TOBACCO_USE: 1

## 2021-11-02 NOTE — ANESTHESIA PREPROCEDURE EVALUATION
Anesthesia Pre-Procedure Evaluation    Patient: Francesca Rivera   MRN: [id number] : 1965        Preoperative Diagnosis: Family history of colon cancer [Z80.0]    Procedure : Procedure(s):  COLONOSCOPY          Past Medical History:   Diagnosis Date     Asthma      Cervical high risk HPV (human papillomavirus) test positive 2019    See problem list     Tobacco use disorder 2005      Past Surgical History:   Procedure Laterality Date     COLONOSCOPY N/A 2016    Procedure: COLONOSCOPY;  Surgeon: Darell Mayes MD;  Location: [facility]     TUBAL LIGATION        Allergies   Allergen Reactions     Nka [No Known Allergies]       Social History     Tobacco Use     Smoking status: Former Smoker     Packs/day: 1.00     Years: 28.00     Pack years: 28.00     Types: Cigarettes     Quit date: 2010     Years since quittin.4     Smokeless tobacco: Never Used   Substance Use Topics     Alcohol use: Yes     Comment: occ      Wt Readings from Last 1 Encounters:   10/08/21 76.7 kg (169 lb)        Anesthesia Evaluation   Pt has had prior anesthetic. Type: General and MAC.        ROS/MED HX  ENT/Pulmonary:     (+) allergic rhinitis, tobacco use, Past use, asthma     Neurologic:  - neg neurologic ROS     Cardiovascular:     (+) Dyslipidemia -----    METS/Exercise Tolerance:     Hematologic:  - neg hematologic  ROS     Musculoskeletal:  - neg musculoskeletal ROS     GI/Hepatic:     (+) GERD, bowel prep,     Renal/Genitourinary:  - neg Renal ROS     Endo:  - neg endo ROS     Psychiatric/Substance Use:  - neg psychiatric ROS     Infectious Disease:  - neg infectious disease ROS     Malignancy:  - neg malignancy ROS     Other:            Physical Exam    Airway        Mallampati: I   TM distance: > 3 FB   Neck ROM: full   Mouth opening: > 3 cm    Respiratory Devices and Support         Dental  no notable dental history         Cardiovascular   cardiovascular exam normal          Pulmonary   pulmonary  exam normal                OUTSIDE LABS:  CBC:   Lab Results   Component Value Date    WBC 6.9 07/22/2020    WBC 6.7 11/08/2007    HGB 12.8 07/22/2020    HGB 11.6 (L) 11/08/2007    HCT 39.7 07/22/2020    HCT 34.9 (L) 11/08/2007     07/22/2020     11/08/2007     BMP:   Lab Results   Component Value Date     07/22/2020    POTASSIUM 4.0 07/22/2020    CHLORIDE 109 07/22/2020    CO2 27 07/22/2020    BUN 17 07/22/2020    CR 0.64 07/22/2020    GLC 93 10/08/2021     (H) 07/22/2020     COAGS: No results found for: PTT, INR, FIBR  POC: No results found for: BGM, HCG, HCGS  HEPATIC:   Lab Results   Component Value Date    ALBUMIN 4.3 11/08/2007    PROTTOTAL 7.3 11/08/2007    ALT 16 11/08/2007    AST 30 11/08/2007    ALKPHOS 53 11/08/2007    BILITOTAL 0.3 11/08/2007     OTHER:   Lab Results   Component Value Date    A1C 5.4 09/02/2020    JACKELINE 8.8 07/22/2020    TSH 1.66 12/07/2006       Anesthesia Plan    ASA Status:  2   NPO Status:  NPO Appropriate    Anesthesia Type: General.     - Airway: Native airway              Consents    Anesthesia Plan(s) and associated risks, benefits, and realistic alternatives discussed. Questions answered and patient/representative(s) expressed understanding.     - Discussed with:  Patient         Postoperative Care            Comments:                JAZIEL Blank CRNA

## 2021-11-03 ENCOUNTER — ANESTHESIA (OUTPATIENT)
Dept: GASTROENTEROLOGY | Facility: CLINIC | Age: 56
End: 2021-11-03
Payer: COMMERCIAL

## 2021-11-03 ENCOUNTER — HOSPITAL ENCOUNTER (OUTPATIENT)
Facility: CLINIC | Age: 56
Discharge: HOME | End: 2021-11-03
Attending: SURGERY | Admitting: SURGERY
Payer: COMMERCIAL

## 2021-11-03 VITALS
HEART RATE: 76 BPM | WEIGHT: 169 LBS | DIASTOLIC BLOOD PRESSURE: 91 MMHG | OXYGEN SATURATION: 98 % | RESPIRATION RATE: 16 BRPM | HEIGHT: 64 IN | BODY MASS INDEX: 28.85 KG/M2 | SYSTOLIC BLOOD PRESSURE: 122 MMHG | TEMPERATURE: 98.6 F

## 2021-11-03 LAB — COLONOSCOPY: NORMAL

## 2021-11-03 PROCEDURE — 88305 TISSUE EXAM BY PATHOLOGIST: CPT | Mod: TC | Performed by: SURGERY

## 2021-11-03 PROCEDURE — 258N000003 HC RX IP 258 OP 636: Performed by: SURGERY

## 2021-11-03 PROCEDURE — 370N000017 HC ANESTHESIA TECHNICAL FEE, PER MIN: Performed by: SURGERY

## 2021-11-03 PROCEDURE — 45385 COLONOSCOPY W/LESION REMOVAL: CPT | Performed by: SURGERY

## 2021-11-03 PROCEDURE — 250N000009 HC RX 250: Performed by: SURGERY

## 2021-11-03 PROCEDURE — 250N000011 HC RX IP 250 OP 636: Performed by: NURSE ANESTHETIST, CERTIFIED REGISTERED

## 2021-11-03 PROCEDURE — 45385 COLONOSCOPY W/LESION REMOVAL: CPT | Mod: PT | Performed by: SURGERY

## 2021-11-03 PROCEDURE — 250N000009 HC RX 250: Performed by: NURSE ANESTHETIST, CERTIFIED REGISTERED

## 2021-11-03 RX ORDER — PROPOFOL 10 MG/ML
INJECTION, EMULSION INTRAVENOUS CONTINUOUS PRN
Status: DISCONTINUED | OUTPATIENT
Start: 2021-11-03 | End: 2021-11-03

## 2021-11-03 RX ORDER — LIDOCAINE HYDROCHLORIDE 10 MG/ML
INJECTION, SOLUTION EPIDURAL; INFILTRATION; INTRACAUDAL; PERINEURAL PRN
Status: DISCONTINUED | OUTPATIENT
Start: 2021-11-03 | End: 2021-11-03

## 2021-11-03 RX ORDER — ACETAMINOPHEN 500 MG
500-1000 TABLET ORAL EVERY 6 HOURS PRN
COMMUNITY

## 2021-11-03 RX ORDER — LIDOCAINE 40 MG/G
CREAM TOPICAL
Status: DISCONTINUED | OUTPATIENT
Start: 2021-11-03 | End: 2021-11-03 | Stop reason: HOSPADM

## 2021-11-03 RX ORDER — NALOXONE HYDROCHLORIDE 0.4 MG/ML
0.4 INJECTION, SOLUTION INTRAMUSCULAR; INTRAVENOUS; SUBCUTANEOUS
Status: CANCELLED | OUTPATIENT
Start: 2021-11-03

## 2021-11-03 RX ORDER — SODIUM CHLORIDE, SODIUM LACTATE, POTASSIUM CHLORIDE, CALCIUM CHLORIDE 600; 310; 30; 20 MG/100ML; MG/100ML; MG/100ML; MG/100ML
INJECTION, SOLUTION INTRAVENOUS CONTINUOUS
Status: DISCONTINUED | OUTPATIENT
Start: 2021-11-03 | End: 2021-11-03 | Stop reason: HOSPADM

## 2021-11-03 RX ORDER — FLUMAZENIL 0.1 MG/ML
0.2 INJECTION, SOLUTION INTRAVENOUS
Status: CANCELLED | OUTPATIENT
Start: 2021-11-03 | End: 2021-11-03

## 2021-11-03 RX ORDER — ONDANSETRON 2 MG/ML
4 INJECTION INTRAMUSCULAR; INTRAVENOUS
Status: DISCONTINUED | OUTPATIENT
Start: 2021-11-03 | End: 2021-11-03 | Stop reason: HOSPADM

## 2021-11-03 RX ORDER — NALOXONE HYDROCHLORIDE 0.4 MG/ML
0.2 INJECTION, SOLUTION INTRAMUSCULAR; INTRAVENOUS; SUBCUTANEOUS
Status: CANCELLED | OUTPATIENT
Start: 2021-11-03

## 2021-11-03 RX ORDER — PROPOFOL 10 MG/ML
INJECTION, EMULSION INTRAVENOUS PRN
Status: DISCONTINUED | OUTPATIENT
Start: 2021-11-03 | End: 2021-11-03

## 2021-11-03 RX ADMIN — PROPOFOL 50 MG: 10 INJECTION, EMULSION INTRAVENOUS at 09:56

## 2021-11-03 RX ADMIN — SODIUM CHLORIDE, POTASSIUM CHLORIDE, SODIUM LACTATE AND CALCIUM CHLORIDE: 600; 310; 30; 20 INJECTION, SOLUTION INTRAVENOUS at 09:38

## 2021-11-03 RX ADMIN — LIDOCAINE HYDROCHLORIDE 50 MG: 10 INJECTION, SOLUTION EPIDURAL; INFILTRATION; INTRACAUDAL; PERINEURAL at 09:51

## 2021-11-03 RX ADMIN — PROPOFOL 100 MG: 10 INJECTION, EMULSION INTRAVENOUS at 09:51

## 2021-11-03 RX ADMIN — LIDOCAINE HYDROCHLORIDE 0.1 ML: 10 INJECTION, SOLUTION EPIDURAL; INFILTRATION; INTRACAUDAL; PERINEURAL at 09:38

## 2021-11-03 RX ADMIN — PROPOFOL 200 MCG/KG/MIN: 10 INJECTION, EMULSION INTRAVENOUS at 09:51

## 2021-11-03 ASSESSMENT — MIFFLIN-ST. JEOR: SCORE: 1342.61

## 2021-11-03 NOTE — ANESTHESIA CARE TRANSFER NOTE
Patient: Francesca Rivera    Procedure: Procedure(s):  COLONOSCOPY, FLEXIBLE, WITH LESION REMOVAL USING SNARE       Diagnosis: Family history of colon cancer [Z80.0]  Diagnosis Additional Information: No value filed.    Anesthesia Type:   General     Note:    Oropharynx: oropharynx clear of all foreign objects and spontaneously breathing  Level of Consciousness: awake  Oxygen Supplementation: room air    Independent Airway: airway patency satisfactory and stable  Dentition: dentition unchanged  Vital Signs Stable: post-procedure vital signs reviewed and stable  Report to RN Given: handoff report given  Patient transferred to: Phase II    Handoff Report: Identifed the Patient, Identified the Reponsible Provider, Reviewed the pertinent medical history, Discussed the surgical course, Reviewed Intra-OP anesthesia mangement and issues during anesthesia, Set expectations for post-procedure period and Allowed opportunity for questions and acknowledgement of understanding      Vitals:  Vitals Value Taken Time   BP     Temp     Pulse     Resp     SpO2         Electronically Signed By: JAZIEL Macedo CRNA  November 3, 2021  10:09 AM

## 2021-11-03 NOTE — H&P
56 year old year old female here for colonoscopy for family history of colon cancer.  Patients father was diagnosed with colon cancer .  Last colonoscopy was 5 years ago and negative.  Patient denies change in stool caliber or blood in stool.    Patient Active Problem List   Diagnosis     Mild intermittent asthma     Allergic rhinitis     Esophageal reflux     Asymptomatic postmenopausal status     Post-menopausal atrophic vaginitis     Lichen sclerosis et atrophicus     Seasonal Moderate persistent asthma     Age-related osteoporosis without current pathological fracture     Cervical high risk HPV (human papillomavirus) test positive     Hyperlipidemia LDL goal <100       Past Medical History:   Diagnosis Date     Asthma      Cervical high risk HPV (human papillomavirus) test positive 06/07/2019    See problem list     Tobacco use disorder 12/29/2005       Past Surgical History:   Procedure Laterality Date     COLONOSCOPY N/A 5/20/2016    Procedure: COLONOSCOPY;  Surgeon: Darell Mayes MD;  Location: [facility]     TUBAL LIGATION         Family History   Problem Relation Age of Onset     Alcohol/Drug Father      Diabetes Father      Hypertension Father      Psychotic Disorder Father         bipolar     Asthma Father      Cancer - colorectal Father 60     Depression Father      Cancer Maternal Grandfather         lung     Arthritis Maternal Grandfather      Cancer Paternal Grandmother         stomach     Psychotic Disorder Paternal Grandmother         bipolar     Depression Paternal Grandmother      Arthritis Paternal Grandmother      Breast Cancer Maternal Grandmother      Cancer - colorectal Maternal Grandmother      Arthritis Maternal Grandmother      Depression Daughter      Lupus Mother      C.A.D. Paternal Grandfather      Cerebrovascular Disease Paternal Grandfather      Arthritis Paternal Grandfather      Depression Son        No current outpatient medications on file.       Allergies   Allergen  Did not return for PPD reading after Nov 28th placement. Too early to place a new test today, must return on or after December 12th for placement. Patient verbalizes understanding.     Teressa Stephens NP  12/06/17 "Reactions     Nka [No Known Allergies]        Pt reports that she quit smoking about 11 years ago. Her smoking use included cigarettes. She has a 28.00 pack-year smoking history. She has never used smokeless tobacco. She reports current alcohol use. She reports that she does not use drugs.    Exam:  /85 (BP Location: Left arm)   Pulse 87   Temp 98.6  F (37  C) (Oral)   Resp 16   Ht 1.619 m (5' 3.75\")   Wt 76.7 kg (169 lb)   LMP 08/15/2006   SpO2 97%   BMI 29.24 kg/m      Awake, Alert OX3  Lungs - CTA bilaterally  CV - RRR, no murmurs, distal pulses intact  Abd - soft, non-distended, non-tender, +BS  Extr - No cyanosis or edema    A/P 56 year old year old female in need of colonoscopy for family history of colon cancer. Risks, benefits, alternatives, and complications were discussed including the possibility of perforation, bleeding, missed lesion, anesthetic complication, need for additional surgery/procedure, and the patient agreed to proceed.    Chris Alicia, DO on 11/3/2021 at 9:13 AM    "

## 2021-11-03 NOTE — ANESTHESIA POSTPROCEDURE EVALUATION
Patient: Francesca Rivera    Procedure: Procedure(s):  COLONOSCOPY, FLEXIBLE, WITH LESION REMOVAL USING SNARE       Diagnosis:Family history of colon cancer [Z80.0]  Diagnosis Additional Information: No value filed.    Anesthesia Type:  General    Note:  Disposition: Outpatient   Postop Pain Control: Uneventful            Sign Out: Well controlled pain   PONV: No   Neuro/Psych: Uneventful            Sign Out: Acceptable/Baseline neuro status   Airway/Respiratory: Uneventful            Sign Out: Acceptable/Baseline resp. status   CV/Hemodynamics: Uneventful            Sign Out: Acceptable CV status; No obvious hypovolemia; No obvious fluid overload   Other NRE: NONE   DID A NON-ROUTINE EVENT OCCUR? No           Last vitals:  Vitals Value Taken Time   BP     Temp     Pulse     Resp     SpO2         Electronically Signed By: JAZIEL Macedo CRNA  November 3, 2021  10:12 AM

## 2021-11-04 LAB
PATH REPORT.COMMENTS IMP SPEC: NORMAL
PATH REPORT.COMMENTS IMP SPEC: NORMAL
PATH REPORT.FINAL DX SPEC: NORMAL
PATH REPORT.GROSS SPEC: NORMAL
PATH REPORT.MICROSCOPIC SPEC OTHER STN: NORMAL
PATH REPORT.RELEVANT HX SPEC: NORMAL
PHOTO IMAGE: NORMAL

## 2021-11-04 PROCEDURE — 88305 TISSUE EXAM BY PATHOLOGIST: CPT | Mod: 26 | Performed by: PATHOLOGY

## 2021-11-16 ENCOUNTER — OFFICE VISIT (OUTPATIENT)
Dept: FAMILY MEDICINE | Facility: CLINIC | Age: 56
End: 2021-11-16
Payer: COMMERCIAL

## 2021-11-16 ENCOUNTER — ANCILLARY PROCEDURE (OUTPATIENT)
Dept: GENERAL RADIOLOGY | Facility: CLINIC | Age: 56
End: 2021-11-16
Attending: NURSE PRACTITIONER
Payer: COMMERCIAL

## 2021-11-16 VITALS
RESPIRATION RATE: 16 BRPM | WEIGHT: 174 LBS | DIASTOLIC BLOOD PRESSURE: 88 MMHG | OXYGEN SATURATION: 98 % | HEIGHT: 64 IN | BODY MASS INDEX: 29.71 KG/M2 | HEART RATE: 77 BPM | TEMPERATURE: 96.8 F | SYSTOLIC BLOOD PRESSURE: 138 MMHG

## 2021-11-16 DIAGNOSIS — R07.81 RIB PAIN ON RIGHT SIDE: Primary | ICD-10-CM

## 2021-11-16 DIAGNOSIS — R07.81 RIB PAIN ON RIGHT SIDE: ICD-10-CM

## 2021-11-16 PROCEDURE — 71101 X-RAY EXAM UNILAT RIBS/CHEST: CPT | Mod: RT | Performed by: RADIOLOGY

## 2021-11-16 PROCEDURE — 99213 OFFICE O/P EST LOW 20 MIN: CPT | Performed by: NURSE PRACTITIONER

## 2021-11-16 ASSESSMENT — PAIN SCALES - GENERAL: PAINLEVEL: EXTREME PAIN (8)

## 2021-11-16 ASSESSMENT — MIFFLIN-ST. JEOR: SCORE: 1365.29

## 2021-11-16 NOTE — PATIENT INSTRUCTIONS
Will be notified of pending x ray results.  Continue with Advil, ice or heat, deep breaths and giving this time to heal.  If it worsens, let us know.    Our Clinic hours are:  Mondays    7:20 am - 7 pm  Tues -  Fri  7:20 am - 5 pm    Clinic Phone: [phone]    The clinic lab opens at 7:30 am Mon - Fri and appointments are required.    [facility]  Ph. [phone]  Monday  8 am - 7pm  Tues - Fri 8 am - 5:30 pm

## 2021-11-16 NOTE — PROGRESS NOTES
"SUBJECTIVE:    56-year-old female presents to clinic today with chief concern of right-sided chest pain. Pain started yesterday morning when lifting a backpack, felt \"like a punch.\" Pain located in right upper chest, midclavicular line. Pain is constant. Dull, tight pain at rest. Sharp pain with deep breath, laughter, or movement. Pain radiates to back. Movement, deep breathing, and laughing make pain worse. Patient took ibuprofen 2 tablets every four hours yesterday which may have helped a bit. Has not tried tylenol, ice, or heat. No dyspnea, no chest pain other than right side. Patient did not sleep well last night related to pain, unable to find comfortable position.    Patient returned from [place] yesterday. Went to a [employer] and crowd surfed, fell and landed on buttocks. She did not hit her torso. No pain after fall.      Answers for HPI/ROS submitted by the patient on 11/16/2021  Your back pain is: new  What do you think is the original cause of your back pain?: lifting  When did you first notice your back pain? : yesterday  How would you describe your back pain? : sharp  How often do you feel your back pain? : constantly  Where is your back pain located? : right middle of back  Where does your back pain spread? : nowhere  Since you noticed your back pain, how has it changed? : always present, but gets better and worse  Does your back pain interfere with your job?: No  On a scale of 1-10 (10 being the worst), how strong is your back pain?: 8  What makes your back pain worse? : other  Acupuncture:: not tried  Acetaminophen: not tried  Activity or Exercise: not helpful  Chiropractor: not tried  Cold: not tried  Heat: not tried  Massage: not tried  Physical Therapy: not tried  Rest: not tried  Steroid Injection: not tried  Stretching : not tried  Surgery: not tried  TENS Unit: not tried  Topical pain relievers : not tried  Do you see any other healthcare providers for your back pain? : None  How many " "servings of fruits and vegetables do you eat daily?: 2-3  On average, how many sweetened beverages do you drink each day (Examples: soda, juice, sweet tea, etc.  Do NOT count diet or artificially sweetened beverages)?: 0  How many minutes a day do you exercise enough to make your heart beat faster?: 30 to 60  How many days a week do you exercise enough to make your heart beat faster?: 4  How many days per week do you miss taking your medication?: 0    OBJECTIVE:  /88   Pulse 77   Temp 96.8  F (36  C)   Resp 16   Ht 1.619 m (5' 3.75\")   Wt 78.9 kg (174 lb)   LMP 08/15/2006   SpO2 98%   BMI 30.10 kg/m       Exam:  Constitutional: healthy, alert, mild distress with movement  Cardiovascular: RRR, S1 and S2 without murmur  Respiratory: Clear to auscultation bilaterally without wheezes, rhonchi, or rales, no crepitus  Musculoskeletal: No gross deformity, bilateral upper extremity strength 5/5, ROM intact  Skin: no suspicious lesions or rashes, no ecchymosis, skin clear and intact  Psychiatric: appropriate affect, speech clear, intact, and well-paced      ASSESSMENT/PLAN:  Per encounter diagnoses and orders.  ----- Services Performed by a STUDENT in Presence of ATTENDING Physician-------      "

## 2021-11-16 NOTE — PROGRESS NOTES
Assessment & Plan     Rib pain on right side    - XR Ribs & Chest Right G/E 3 Views; Future             See Patient Instructions  Patient Instructions   Will be notified of pending x ray results.  Continue with Advil, ice or heat, deep breaths and giving this time to heal.  If it worsens, let us know.    Our Clinic hours are:  Mondays    7:20 am - 7 pm  Tues - Fri  7:20 am - 5 pm    Clinic Phone: [phone]    The clinic lab opens at 7:30 am Mon - Fri and appointments are required.    [facility]. [phone]  Monday  8 am - 7pm  Tues - Fri 8 am - 5:30 pm             Return in about 2 weeks (around 11/30/2021) for or sooner if symptoms persist or worsen.    Lynn Tyler, JAZIEL CNP  [facility]    Bianca Lopez is a 56 year old who presents for the following health issues  accompanied by her self .    Musculoskeletal Problem    History of Present Illness       Back Pain:  She presents for follow up of back pain. Patient's back pain is a new problem.    Original cause of back pain: lifting  First noticed back pain: yesterday  Patient feels back pain: constantlyLocation of back pain:  Right middle of back  Description of back pain: sharp  Back pain spreads: nowhere    Since patient first noticed back pain, pain is: always present, but gets better and worse  Does back pain interfere with her job:  No  On a scale of 1-10 (10 being the worst), patient describes pain as:  8  What makes back pain worse: other  Acupuncture: not tried  Acetaminophen: not tried  Activity or exercise: not helpful  Chiropractor:  Not tried  Cold: not tried  Heat: not tried  Massage: not tried  Physical Therapy: not tried  Rest: not tried  Steroid Injection: not tried  Stretching: not tried  Surgery: not tried  TENS unit: not tried  Topical pain relievers: not tried  Other healthcare providers patient is seeing for back pain: None    She eats 2-3 servings of fruits and vegetables  "daily.She consumes 0 sweetened beverage(s) daily.She exercises with enough effort to increase her heart rate 30 to 60 minutes per day.  She exercises with enough effort to increase her heart rate 4 days per week.   She is taking medications regularly.       Musculoskeletal problem/pain  Onset/Duration: yesterday about 10 am   Description  Location: chest and back  - right  Joint Swelling: no  Redness: no  Pain: YES  Warmth: no  Intensity:  severe  Progression of Symptoms:  same  Accompanying signs and symptoms:   Fevers: no  Numbness/tingling/weakness: no  History  Trauma to the area: patient was body surfing at a concert and she fell and landed on her buttocks on the 12 th patient  her back pack at home and felt the pain in her rib area    Recent illness:  no  Previous similar problem: no  Previous evaluation:  no  Precipitating or alleviating factors:  Aggravating factors include: movement and taking a deep breath   Therapies tried and outcome: nothing        Review of Systems   See above            Objective    /88   Pulse 77   Temp 96.8  F (36  C)   Resp 16   Ht 1.619 m (5' 3.75\")   Wt 78.9 kg (174 lb)   LMP 08/15/2006   SpO2 98%   BMI 30.10 kg/m    Body mass index is 30.1 kg/m .  Physical Exam   GENERAL: healthy, alert and no distress  NECK: no asymmetry  RESP: lungs clear to auscultation  CV: regular rate and rhythm  MS: no gross musculoskeletal defects noted, no obvious rib abnormalities or bruising, pain right lower rib area with squeeze test                  "

## 2021-12-13 ENCOUNTER — ALLIED HEALTH/NURSE VISIT (OUTPATIENT)
Dept: FAMILY MEDICINE | Facility: CLINIC | Age: 56
End: 2021-12-13
Payer: COMMERCIAL

## 2021-12-13 DIAGNOSIS — Z23 NEED FOR VACCINATION: Primary | ICD-10-CM

## 2021-12-13 PROCEDURE — 90471 IMMUNIZATION ADMIN: CPT

## 2021-12-13 PROCEDURE — 90750 HZV VACC RECOMBINANT IM: CPT

## 2021-12-13 PROCEDURE — 99207 PR NO CHARGE NURSE ONLY: CPT

## 2021-12-13 NOTE — NURSING NOTE
Prior to immunization administration, verified patients identity using patient s name and date of birth. Please see Immunization Activity for additional information.     Screening Questionnaire for Adult Immunization    Are you sick today?   No   Do you have allergies to medications, food, a vaccine component or latex?   No   Have you ever had a serious reaction after receiving a vaccination?   No   Do you have a long-term health problem with heart, lung, kidney, or metabolic disease (e.g., diabetes), asthma, a blood disorder, no spleen, complement component deficiency, a cochlear implant, or a spinal fluid leak?  Are you on long-term aspirin therapy?   No   Do you have cancer, leukemia, HIV/AIDS, or any other immune system problem?   No   Do you have a parent, brother, or sister with an immune system problem?   No   In the past 3 months, have you taken medications that affect  your immune system, such as prednisone, other steroids, or anticancer drugs; drugs for the treatment of rheumatoid arthritis, Crohn s disease, or psoriasis; or have you had radiation treatments?   No   Have you had a seizure, or a brain or other nervous system problem?   No   During the past year, have you received a transfusion of blood or blood    products, or been given immune (gamma) globulin or antiviral drug?   No   For women: Are you pregnant or is there a chance you could become       pregnant during the next month?   No   Have you received any vaccinations in the past 4 weeks?   No     Immunization questionnaire answers were all negative.        Per orders of Dr. Hayes , injection of shingrex given by Shazia Samayoa CMA. Patient instructed to remain in clinic for 15 minutes afterwards, and to report any adverse reaction to me immediately.       Screening performed by Shazia Samayoa CMA on 12/13/2021 at 8:30 AM.

## 2022-02-28 ENCOUNTER — ALLIED HEALTH/NURSE VISIT (OUTPATIENT)
Dept: FAMILY MEDICINE | Facility: CLINIC | Age: 57
End: 2022-02-28
Payer: COMMERCIAL

## 2022-02-28 DIAGNOSIS — Z23 NEED FOR VACCINATION: Primary | ICD-10-CM

## 2022-02-28 PROCEDURE — 90750 HZV VACC RECOMBINANT IM: CPT | Performed by: FAMILY MEDICINE

## 2022-02-28 PROCEDURE — 90471 IMMUNIZATION ADMIN: CPT | Performed by: FAMILY MEDICINE

## 2022-02-28 NOTE — NURSING NOTE
Prior to immunization administration, verified patients identity using patient s name and date of birth. Please see Immunization Activity for additional information.     Screening Questionnaire for Pediatric Immunization    Is the child sick today?   No   Does the child have allergies to medications, food, a vaccine component, or latex?   No   Has the child had a serious reaction to a vaccine in the past?   No   Does the child have a long-term health problem with lung, heart, kidney or metabolic disease (e.g., diabetes), asthma, a blood disorder, no spleen, complement component deficiency, a cochlear implant, or a spinal fluid leak?  Is he/she on long-term aspirin therapy?   No   If the child to be vaccinated is 2 through 4 years of age, has a healthcare provider told you that the child had wheezing or asthma in the  past 12 months?   No   If your child is a baby, have you ever been told he or she has had intussusception?   No   Has the child, sibling or parent had a seizure, has the child had brain or other nervous system problems?   No   Does the child have cancer, leukemia, AIDS, or any immune system         problem?   No   Does the child have a parent, brother, or sister with an immune system problem?   No   In the past 3 months, has the child taken medications that affect the immune system such as prednisone, other steroids, or anticancer drugs; drugs for the treatment of rheumatoid arthritis, Crohn s disease, or psoriasis; or had radiation treatments?   No   In the past year, has the child received a transfusion of blood or blood products, or been given immune (gamma) globulin or an antiviral drug?   No   Is the child/teen pregnant or is there a chance that she could become       pregnant during the next month?   No   Has the child received any vaccinations in the past 4 weeks?   No      Immunization questionnaire answers were all negative.        [place] given to patient.    Per  orders of Dr. Yousif, injection of Shingrix given by Dc Hughes CMA. Patient instructed to remain in clinic for 15 minutes afterwards, and to report any adverse reaction to me immediately.    Screening performed by Dc Hughes CMA on 2/28/2022 at 8:19 AM.

## 2022-03-07 ENCOUNTER — MYC MEDICAL ADVICE (OUTPATIENT)
Dept: FAMILY MEDICINE | Facility: CLINIC | Age: 57
End: 2022-03-07
Payer: COMMERCIAL

## 2022-03-21 NOTE — PROGRESS NOTES
Chief Complaint - Hearing loss    History of Present Illness - Francesca Rivera is a 56 year old female who presents to me today with an intermittent left ear pressure. She has never had this happen before. Right ear is okay.  It has been present and noticeable for approximately 4 months. No autophony. Hearing seems good.  This was preceded by an upper respiratory infection. No allergies. Normal nasal breathing. There is no history of chronic ear disease or ear surgery. The patient denies vertigo, otorrhea, or otalgia. The patient has tried nothing.     Past Medical History -   Patient Active Problem List   Diagnosis     Mild intermittent asthma     Allergic rhinitis     Esophageal reflux     Asymptomatic postmenopausal status     Post-menopausal atrophic vaginitis     Lichen sclerosis et atrophicus     Seasonal Moderate persistent asthma     Age-related osteoporosis without current pathological fracture     Cervical high risk HPV (human papillomavirus) test positive     Hyperlipidemia LDL goal <100       Current Medications -   Current Outpatient Medications:      acetaminophen (TYLENOL) 500 MG tablet, Take 500-1,000 mg by mouth every 6 hours as needed for mild pain, Disp: , Rfl:      ADVIL 200 MG OR CAPS, prn, Disp: , Rfl:      albuterol (VENTOLIN HFA) 108 (90 Base) MCG/ACT inhaler, Inhale 2 puffs into the lungs every 4 hours as needed for shortness of breath / dyspnea or wheezing FILL ALBUTEROL BRAND/GENERIC AS COVERED, Disp: 18 Inhaler, Rfl: 2     atorvastatin (LIPITOR) 40 MG tablet, Take 1 tablet (40 mg) by mouth daily, Disp: 90 tablet, Rfl: 3     CITRACAL + D 250-62.5 MG-UNIT OR TABS, , Disp: , Rfl: 0     clobetasol (TEMOVATE) 0.05 % external ointment, Apply  topically. Apply small amount twice weekly as needed for maintenance. May use daily for 1-2 weeks for exacerbation., Disp: 45 g, Rfl: 3     estradiol (ESTRACE) 0.5 MG tablet, 0.5 mg tablet inserted vaginally twice a week, Disp: 24 tablet, Rfl: 3      FLAXSEED OIL OR, 1tab twice daily, Disp: , Rfl:      fluticasone (FLONASE) 50 MCG/ACT nasal spray, USE 2 SPRAYS IN EACH       NOSTRIL DAILY, Disp: 48 g, Rfl: 4     fluticasone-salmeterol (ADVAIR DISKUS) 250-50 MCG/DOSE inhaler, Inhale 1 puff into the lungs 2 times daily, Disp: 180 each, Rfl: 3     IBANdronate (BONIVA) 150 MG tablet, Take 1 tablet (150 mg) by mouth every 30 days, Disp: 3 tablet, Rfl: 3     multivitamin w/minerals (MULTI-VITAMIN) tablet, Take 1 tablet by mouth daily, Disp: , Rfl:      omeprazole (PRILOSEC) 40 MG DR capsule, TAKE 1 CAPSULE DAILY, Disp: 90 capsule, Rfl: 2    Allergies -   Allergies   Allergen Reactions     Nka [No Known Allergies]        Social History -   Social History     Socioeconomic History     Marital status:      Spouse name: Not on file     Number of children: Not on file     Years of education: Not on file     Highest education level: Not on file   Occupational History     Not on file   Tobacco Use     Smoking status: Former Smoker     Packs/day: 1.00     Years: 28.00     Pack years: 28.00     Types: Cigarettes     Quit date: 2010     Years since quittin.8     Smokeless tobacco: Never Used   Substance and Sexual Activity     Alcohol use: Yes     Comment: occ     Drug use: No     Sexual activity: Yes     Partners: Male     Birth control/protection: Surgical   Other Topics Concern     Parent/sibling w/ CABG, MI or angioplasty before 65F 55M? No   Social History Narrative     Not on file     Social Determinants of Health     Financial Resource Strain: Not on file   Food Insecurity: Not on file   Transportation Needs: Not on file   Physical Activity: Not on file   Stress: Not on file   Social Connections: Not on file   Intimate Partner Violence: Not on file   Housing Stability: Not on file       Family History -   Family History   Problem Relation Age of Onset     Alcohol/Drug Father      Diabetes Father      Hypertension Father      Psychotic Disorder Father          bipolar     Asthma Father      Cancer - colorectal Father 60     Depression Father      Cancer Maternal Grandfather         lung     Arthritis Maternal Grandfather      Cancer Paternal Grandmother         stomach     Psychotic Disorder Paternal Grandmother         bipolar     Depression Paternal Grandmother      Arthritis Paternal Grandmother      Breast Cancer Maternal Grandmother      Cancer - colorectal Maternal Grandmother      Arthritis Maternal Grandmother      Depression Daughter      Lupus Mother      C.A.D. Paternal Grandfather      Cerebrovascular Disease Paternal Grandfather      Arthritis Paternal Grandfather      Depression Son        Review of Systems - As per HPI and PMHx, otherwise 7 system review of the head and neck negative.    Physical Exam  /86   Pulse 76   Resp 16   LMP 08/15/2006   SpO2 97%   General - The patient is nontoxic, in no distress.  Alert and oriented to person and place, answers questions and cooperates with examination appropriately.   Voice and Breathing - The patient was breathing comfortably without the use of accessory muscles. There was no wheezing, stridor, or stertor.  The patients voice was clear and strong.  Ears - clean canals. The tympanic membrane on the right is intact, no middle ear effusion. No acute infection.  No fluid or purulence was seen in the external canal. The tympanic membrane on the left is intact, no middle ear effusion. No acute infection.  No fluid or purulence was seen in the external canal.   Eyes - Extraocular movements intact. Sclera were not icteric or injected.  Mouth - Examination of the oral cavity showed pink, healthy mucosa. No lesions or ulcerations noted.  The tongue was mobile and midline.  Throat - The walls of the oropharynx were smooth, symmetric, and had no lesions or ulcerations.  The uvula was midline on elevation.    Neck - Soft, non-tender. Palpation of the occipital, submental, submandibular, internal jugular chain,  and supraclavicular nodes did not demonstrate any abnormal lymph nodes or masses. No parotid masses. Palpation of the thyroid was soft and smooth, with no nodules or goiter appreciated.  The trachea was mobile and midline.  Neurological - Cranial nerves 2 through 12 were grossly intact. House-Brackmann grade 1 out of 6 bilaterally.    Audiologic Studies - An audiogram and tympanogram were performed today as part of the evaluation and personally reviewed. The tympanogram shows a type A low volume on both sides.  The audiogram showed mild asymmetry in the sensorineural hearing at 3k and 6k on the left. Right hearing was normal. It wasn't present on her 2013 audiogram.      Assessment and Plan - Francesca Rivera is a 56 year old female who presents to me today with intermittent pressure in the left ear.  Her exam is normal.  Her tympanograms are type a without negative pressure today.  She does have a slight asymmetry with worse hearing at 3K and 6K in the left ear compared to the right.  It is possible she perceives this as a pressure.  It may also be a nonear related issue such as TMJ.  She did have some teeth issues not that long ago requiring repair.  I offered MRI brain but the patient elected to undergo serial audiograms and will return in 6 months for repeat audiogram unless things worsen she will return sooner.    Zechariah Ward MD  Otolaryngology  [facility]

## 2022-03-23 ENCOUNTER — OFFICE VISIT (OUTPATIENT)
Dept: OTOLARYNGOLOGY | Facility: CLINIC | Age: 57
End: 2022-03-23
Payer: COMMERCIAL

## 2022-03-23 ENCOUNTER — OFFICE VISIT (OUTPATIENT)
Dept: AUDIOLOGY | Facility: CLINIC | Age: 57
End: 2022-03-23
Payer: COMMERCIAL

## 2022-03-23 VITALS
RESPIRATION RATE: 16 BRPM | SYSTOLIC BLOOD PRESSURE: 123 MMHG | OXYGEN SATURATION: 97 % | DIASTOLIC BLOOD PRESSURE: 86 MMHG | HEART RATE: 76 BPM

## 2022-03-23 DIAGNOSIS — H90.42 SENSORINEURAL HEARING LOSS (SNHL) OF LEFT EAR WITH UNRESTRICTED HEARING OF RIGHT EAR: Primary | ICD-10-CM

## 2022-03-23 DIAGNOSIS — H90.3 SNHL (SENSORY-NEURAL HEARING LOSS), ASYMMETRICAL: Primary | ICD-10-CM

## 2022-03-23 DIAGNOSIS — H93.8X2 EAR PRESSURE, LEFT: ICD-10-CM

## 2022-03-23 PROCEDURE — 99203 OFFICE O/P NEW LOW 30 MIN: CPT | Performed by: OTOLARYNGOLOGY

## 2022-03-23 PROCEDURE — 92550 TYMPANOMETRY & REFLEX THRESH: CPT | Performed by: AUDIOLOGIST

## 2022-03-23 PROCEDURE — 92557 COMPREHENSIVE HEARING TEST: CPT | Performed by: AUDIOLOGIST

## 2022-03-23 PROCEDURE — 99207 PR NO CHARGE LOS: CPT | Performed by: AUDIOLOGIST

## 2022-03-23 NOTE — PROGRESS NOTES
AUDIOLOGY REPORT:    Patient was referred from ENT by Antony Ward MD for audiology evaluation. Patient reports intermittent left ear pressure that started 4 months ago.  She denies tinnitus, but does report a family history of hearing loss and some noise exposure.    Testing:    Otoscopy:   Otoscopic exam indicates Right ear partially occluded with cerumen, left ear clear     Tympanograms:    RIGHT: normal eardrum mobility     LEFT:   normal eardrum mobility    Reflexes (reported by stimulus ear):  RIGHT: Ipsilateral is present at normal levels  RIGHT: Contralateral is present at normal levels  LEFT:   Ipsilateral is present at normal levels  LEFT:   Contralateral is present at normal levels    Thresholds:   Pure Tone Thresholds assessed using conventional audiometry with good reliability from 250-8000 Hz bilaterally using insert earphones and circumaural headphones      RIGHT:  normal hearing sensitivity      LEFT:    normal hearing sensitivity with 20 dB asymmetry at 3000 and 6000 Hz only, dipping into borderline normal sensorineural hearing loss    Speech Reception Threshold:    RIGHT: 0 dB HL    LEFT:   5 dB HL  Speech Reception Thresholds are in good agreement with pure tone thresholds.    Word Recognition Score:     RIGHT: 100% at 50 dB HL using NU-6 recorded word list.    LEFT:   100% at 50 dB HL using NU-6 recorded word list.    Discussed results with the patient.     Patient was returned to ENT for follow up.     Mike Galindo MA, CCC-A  Licensed Audiologist #[id number]  3/23/2022

## 2022-03-23 NOTE — LETTER
3/23/2022         RE: Francesca Rivera  [id number]  [place] [address]        Dear Colleague,    Thank you for referring your patient, Francesca Rivera, to the [facility]. Please see a copy of my visit note below.    Chief Complaint - Hearing loss    History of Present Illness - Francesca Rivera is a 56 year old female who presents to me today with an intermittent left ear pressure. She has never had this happen before. Right ear is okay.  It has been present and noticeable for approximately 4 months. No autophony. Hearing seems good.  This was preceded by an upper respiratory infection. No allergies. Normal nasal breathing. There is no history of chronic ear disease or ear surgery. The patient denies vertigo, otorrhea, or otalgia. The patient has tried nothing.     Past Medical History -   Patient Active Problem List   Diagnosis     Mild intermittent asthma     Allergic rhinitis     Esophageal reflux     Asymptomatic postmenopausal status     Post-menopausal atrophic vaginitis     Lichen sclerosis et atrophicus     Seasonal Moderate persistent asthma     Age-related osteoporosis without current pathological fracture     Cervical high risk HPV (human papillomavirus) test positive     Hyperlipidemia LDL goal <100       Current Medications -   Current Outpatient Medications:      acetaminophen (TYLENOL) 500 MG tablet, Take 500-1,000 mg by mouth every 6 hours as needed for mild pain, Disp: , Rfl:      ADVIL 200 MG OR CAPS, prn, Disp: , Rfl:      albuterol (VENTOLIN HFA) 108 (90 Base) MCG/ACT inhaler, Inhale 2 puffs into the lungs every 4 hours as needed for shortness of breath / dyspnea or wheezing FILL ALBUTEROL BRAND/GENERIC AS COVERED, Disp: 18 Inhaler, Rfl: 2     atorvastatin (LIPITOR) 40 MG tablet, Take 1 tablet (40 mg) by mouth daily, Disp: 90 tablet, Rfl: 3     CITRACAL + D 250-62.5 MG-UNIT OR TABS, , Disp: , Rfl: 0     clobetasol (TEMOVATE) 0.05 % external ointment, Apply   topically. Apply small amount twice weekly as needed for maintenance. May use daily for 1-2 weeks for exacerbation., Disp: 45 g, Rfl: 3     estradiol (ESTRACE) 0.5 MG tablet, 0.5 mg tablet inserted vaginally twice a week, Disp: 24 tablet, Rfl: 3     FLAXSEED OIL OR, 1tab twice daily, Disp: , Rfl:      fluticasone (FLONASE) 50 MCG/ACT nasal spray, USE 2 SPRAYS IN EACH       NOSTRIL DAILY, Disp: 48 g, Rfl: 4     fluticasone-salmeterol (ADVAIR DISKUS) 250-50 MCG/DOSE inhaler, Inhale 1 puff into the lungs 2 times daily, Disp: 180 each, Rfl: 3     IBANdronate (BONIVA) 150 MG tablet, Take 1 tablet (150 mg) by mouth every 30 days, Disp: 3 tablet, Rfl: 3     multivitamin w/minerals (MULTI-VITAMIN) tablet, Take 1 tablet by mouth daily, Disp: , Rfl:      omeprazole (PRILOSEC) 40 MG DR capsule, TAKE 1 CAPSULE DAILY, Disp: 90 capsule, Rfl: 2    Allergies -   Allergies   Allergen Reactions     Nka [No Known Allergies]        Social History -   Social History     Socioeconomic History     Marital status:      Spouse name: Not on file     Number of children: Not on file     Years of education: Not on file     Highest education level: Not on file   Occupational History     Not on file   Tobacco Use     Smoking status: Former Smoker     Packs/day: 1.00     Years: 28.00     Pack years: 28.00     Types: Cigarettes     Quit date: 2010     Years since quittin.8     Smokeless tobacco: Never Used   Substance and Sexual Activity     Alcohol use: Yes     Comment: occ     Drug use: No     Sexual activity: Yes     Partners: Male     Birth control/protection: Surgical   Other Topics Concern     Parent/sibling w/ CABG, MI or angioplasty before 65F 55M? No   Social History Narrative     Not on file     Social Determinants of Health     Financial Resource Strain: Not on file   Food Insecurity: Not on file   Transportation Needs: Not on file   Physical Activity: Not on file   Stress: Not on file   Social Connections: Not on file    Intimate Partner Violence: Not on file   Housing Stability: Not on file       Family History -   Family History   Problem Relation Age of Onset     Alcohol/Drug Father      Diabetes Father      Hypertension Father      Psychotic Disorder Father         bipolar     Asthma Father      Cancer - colorectal Father 60     Depression Father      Cancer Maternal Grandfather         lung     Arthritis Maternal Grandfather      Cancer Paternal Grandmother         stomach     Psychotic Disorder Paternal Grandmother         bipolar     Depression Paternal Grandmother      Arthritis Paternal Grandmother      Breast Cancer Maternal Grandmother      Cancer - colorectal Maternal Grandmother      Arthritis Maternal Grandmother      Depression Daughter      Lupus Mother      C.A.D. Paternal Grandfather      Cerebrovascular Disease Paternal Grandfather      Arthritis Paternal Grandfather      Depression Son        Review of Systems - As per HPI and PMHx, otherwise 7 system review of the head and neck negative.    Physical Exam  /86   Pulse 76   Resp 16   LMP 08/15/2006   SpO2 97%   General - The patient is nontoxic, in no distress.  Alert and oriented to person and place, answers questions and cooperates with examination appropriately.   Voice and Breathing - The patient was breathing comfortably without the use of accessory muscles. There was no wheezing, stridor, or stertor.  The patients voice was clear and strong.  Ears - clean canals. The tympanic membrane on the right is intact, no middle ear effusion. No acute infection.  No fluid or purulence was seen in the external canal. The tympanic membrane on the left is intact, no middle ear effusion. No acute infection.  No fluid or purulence was seen in the external canal.   Eyes - Extraocular movements intact. Sclera were not icteric or injected.  Mouth - Examination of the oral cavity showed pink, healthy mucosa. No lesions or ulcerations noted.  The tongue was mobile  and midline.  Throat - The walls of the oropharynx were smooth, symmetric, and had no lesions or ulcerations.  The uvula was midline on elevation.    Neck - Soft, non-tender. Palpation of the occipital, submental, submandibular, internal jugular chain, and supraclavicular nodes did not demonstrate any abnormal lymph nodes or masses. No parotid masses. Palpation of the thyroid was soft and smooth, with no nodules or goiter appreciated.  The trachea was mobile and midline.  Neurological - Cranial nerves 2 through 12 were grossly intact. House-Brackmann grade 1 out of 6 bilaterally.    Audiologic Studies - An audiogram and tympanogram were performed today as part of the evaluation and personally reviewed. The tympanogram shows a type A low volume on both sides.  The audiogram showed mild asymmetry in the sensorineural hearing at 3k and 6k on the left. Right hearing was normal. It wasn't present on her 2013 audiogram.      Assessment and Plan - Francesca Rivera is a 56 year old female who presents to me today with intermittent pressure in the left ear.  Her exam is normal.  Her tympanograms are type a without negative pressure today.  She does have a slight asymmetry with worse hearing at 3K and 6K in the left ear compared to the right.  It is possible she perceives this as a pressure.  It may also be a nonear related issue such as TMJ.  She did have some teeth issues not that long ago requiring repair.  I offered MRI brain but the patient elected to undergo serial audiograms and will return in 6 months for repeat audiogram unless things worsen she will return sooner.    Zechariah Ward MD  Otolaryngology  [facility]        Again, thank you for allowing me to participate in the care of your patient.        Sincerely,        Zechariah Ward MD

## 2022-07-25 ENCOUNTER — VIRTUAL VISIT (OUTPATIENT)
Dept: FAMILY MEDICINE | Facility: OTHER | Age: 57
End: 2022-07-25
Payer: COMMERCIAL

## 2022-07-25 DIAGNOSIS — U07.1 INFECTION DUE TO 2019 NOVEL CORONAVIRUS: Primary | ICD-10-CM

## 2022-07-25 PROCEDURE — 99213 OFFICE O/P EST LOW 20 MIN: CPT | Mod: 95 | Performed by: PHYSICIAN ASSISTANT

## 2022-07-25 ASSESSMENT — ASTHMA QUESTIONNAIRES
QUESTION_1 LAST FOUR WEEKS HOW MUCH OF THE TIME DID YOUR ASTHMA KEEP YOU FROM GETTING AS MUCH DONE AT WORK, SCHOOL OR AT HOME: A LITTLE OF THE TIME
QUESTION_5 LAST FOUR WEEKS HOW WOULD YOU RATE YOUR ASTHMA CONTROL: SOMEWHAT CONTROLLED
QUESTION_2 LAST FOUR WEEKS HOW OFTEN HAVE YOU HAD SHORTNESS OF BREATH: THREE TO SIX TIMES A WEEK
QUESTION_3 LAST FOUR WEEKS HOW OFTEN DID YOUR ASTHMA SYMPTOMS (WHEEZING, COUGHING, SHORTNESS OF BREATH, CHEST TIGHTNESS OR PAIN) WAKE YOU UP AT NIGHT OR EARLIER THAN USUAL IN THE MORNING: NOT AT ALL
ACT_TOTALSCORE: 18
QUESTION_4 LAST FOUR WEEKS HOW OFTEN HAVE YOU USED YOUR RESCUE INHALER OR NEBULIZER MEDICATION (SUCH AS ALBUTEROL): TWO OR THREE TIMES PER WEEK
ACT_TOTALSCORE: 18

## 2022-07-25 NOTE — PATIENT INSTRUCTIONS
Will treat with molnupiravir.  Stay well hydrated.      Instructions for Patients      What are the symptoms of COVID-19?  Symptoms can include fever, cough, shortness of breath, chills, headache, muscle pain sore throat, fatigue, runny or stuffy nose, and loss of taste and smell. Other less common symptoms include nausea, vomiting, or diarrhea (watery stools).    Know when to call 911. Emergency warning signs include:  Trouble breathing or shortness of breath  Pain or pressure in the chest that doesn't go away  Feeling confused like you haven't felt before, or not being able to wake up  Bluish-colored lips or face    How can I take care of myself?  Get lots of rest. Drink extra fluids (unless a doctor has told you not to).  Take Tylenol (acetaminophen) for fever or pain. If you have liver or kidney problems, ask your family doctor if it's okay to take Tylenol   Adults:   650 mg (two 325 mg pills or tablets) every 4 to 6 hours, or...   1,000 mg (two 500 mg pills or tablets) every 8 hours as needed.  Note: Don't take more than 3,000 mg in one day. Acetaminophen is found in many medicines (both prescribed and over the counter). Read all labels to be sure you don't take too much.  For children, check the Tylenol bottle for the right dose. The dose is based on the child's age or weight.  Take over the counter medicines for your symptoms as needed. Talk to your pharmacist.  If you have other health problems (like cancer, heart failure, an organ transplant, or severe kidney disease): Call your specialty clinic if you don't feel better in the next 2 days.    These guidelines are for isolating and quarantining before returning to work, school or .   For employers, schools and day cares: This is an official notice for this person s medical guidelines for returning in-person.   For health care sites: The CDC gives different isolation and quarantine guidelines for healthcare sites, please check with these sites before  arriving.     How do I self-isolate?  You isolate when you have symptoms of COVID or a test shows you have COVID, even if you don t have symptoms.   If you DO have symptoms:  Stay home and away from others  For at least 5 days after your symptoms started, AND   You are fever free for 24 hours (with no medicine that reduces fever), AND  Your other symptoms are better.  Wear a mask for 10 full days any time you are around others.  If you DON T have symptoms:  Stay at home and away from others for at least 5 days after your positive test.  Wear a mask for 10 full days any time you are around others.    How and when do I quarantine?  You quarantine when you may have been exposed to the virus and DON T have symptoms.   Stay home and away from others.   You must quarantine for 5 days after your last contact with a person who has COVID.  Note: If you are fully vaccinated, you don t need to quarantine. You should still follow the steps below.   Wear a mask for 10 full days any time you re around others.  Get tested at least 5 days after you were exposed, even if you don t have symptoms.   If you start to have symptoms, isolate right away and get tested.    Where can I get more information?  [facility] COVID-19 Resource Hub: [email or web address]   CDC Quarantine & Isolation: https://www.cdc.gov/coronavirus/2019-ncov/your-health/quarantine-isolation.html   CDC - What to Do If You're Sick: https://www.cdc.gov/coronavirus/2019-ncov/if-you-are-sick/index.html  [facility] clinical trials (COVID-19 research studies): [email or web address]/umn-clinical-trials  [place] Department of Health COVID-19 Public Hotline: 1-817.783.7998

## 2022-07-25 NOTE — PROGRESS NOTES
Jessica is a 57 year old who is being evaluated via a billable video visit.      How would you like to obtain your AVS? MyChart  If the video visit is dropped, the invitation should be resent by: Text to cell phone: [phone]  Will anyone else be joining your video visit? No      Assessment & Plan       ICD-10-CM    1. Infection due to 2019 novel coronavirus  U07.1 molnupiravir (LAGEVRIO) 200 MG capsule       Given her symptoms and risk factors, I recommend treatment with molnupiravir given the medication interactions with Paxlovid. Common side effects discussed. She has no significant drug interaction with this medication. I recommend she stay well hydrated and try to rest, take Tylenol/ibuprofen as needed along with vitamin C, vitamin D and zinc. If symptoms are not improving or are worsening, she should contact the clinic or be seen right away. Quarantine guidelines discussed.     WILFREDO Hernandez [facility]   Jessica is a 57 year old, presenting for the following health issues:  No chief complaint on file.      HPI       COVID-19 Symptom Review  How many days ago did these symptoms start? 6    Are any of the following symptoms significant for you?    New or worsening difficulty breathing? No    Worsening cough? Yes, it's a dry cough.     Fever or chills? No    Headache: No    Sore throat: YES- little bit    Chest pain: No    Diarrhea: YES    Body aches? YES- little    What treatments has patient tried? Acetaminophen,Advil, Nyquil, Lyndsey seltzer plus  Does patient live in a nursing home, group home, or shelter? No  Does patient have a way to get food/medications during quarantined? Yes, I have a friend or family member who can help me.    She had diarrhea last Tuesday but had a negative COVID test. She then started to notice a cough and sore throat this past Friday and had a positive COVID test yesterday. She has a dry cough but denies significant shortness of breath.  She is fully vaccinated and has had her first booster.       Review of Systems   Constitutional, HEENT, cardiovascular, pulmonary, gi and gu systems are negative, except as otherwise noted.      Objective           Vitals:  No vitals were obtained today due to virtual visit.    Physical Exam   GENERAL: Healthy, alert and no distress  EYES: Eyes grossly normal to inspection.  No discharge or erythema, or obvious scleral/conjunctival abnormalities.  RESP: No audible wheeze, cough, or visible cyanosis.  No visible retractions or increased work of breathing.    SKIN: Visible skin clear. No significant rash, abnormal pigmentation or lesions.  NEURO: Cranial nerves grossly intact.  Mentation and speech appropriate for age.  PSYCH: Mentation appears normal, affect normal/bright, judgement and insight intact, normal speech and appearance well-groomed.        Video-Visit Details    Video Start Time: 3:05 am    Type of service:  Video Visit    Video End Time: 3:16 am    Originating Location (pt. Location): Home    Distant Location (provider location):  [facility]     Platform used for Video Visit: MiniVax

## 2022-09-20 ENCOUNTER — IMMUNIZATION (OUTPATIENT)
Dept: FAMILY MEDICINE | Facility: CLINIC | Age: 57
End: 2022-09-20
Payer: COMMERCIAL

## 2022-09-20 DIAGNOSIS — Z23 NEED FOR PROPHYLACTIC VACCINATION AND INOCULATION AGAINST INFLUENZA: Primary | ICD-10-CM

## 2022-09-20 PROCEDURE — 90682 RIV4 VACC RECOMBINANT DNA IM: CPT

## 2022-09-20 PROCEDURE — 90471 IMMUNIZATION ADMIN: CPT

## 2022-09-20 PROCEDURE — 99207 PR NO CHARGE NURSE ONLY: CPT

## 2022-09-29 ASSESSMENT — ENCOUNTER SYMPTOMS
EYE PAIN: 0
HEADACHES: 0
PARESTHESIAS: 0
HEMATURIA: 0
CHILLS: 0
FEVER: 0
DIARRHEA: 0
CONSTIPATION: 0
NAUSEA: 0
FREQUENCY: 0
WEAKNESS: 0
SHORTNESS OF BREATH: 1
HEMATOCHEZIA: 0
SORE THROAT: 0
NERVOUS/ANXIOUS: 0
DIZZINESS: 0
HEARTBURN: 0
PALPITATIONS: 0
BREAST MASS: 0
DYSURIA: 0
ABDOMINAL PAIN: 0
JOINT SWELLING: 0
ARTHRALGIAS: 0
MYALGIAS: 0
COUGH: 0

## 2022-09-29 ASSESSMENT — ASTHMA QUESTIONNAIRES
QUESTION_2 LAST FOUR WEEKS HOW OFTEN HAVE YOU HAD SHORTNESS OF BREATH: MORE THAN ONCE A DAY
QUESTION_3 LAST FOUR WEEKS HOW OFTEN DID YOUR ASTHMA SYMPTOMS (WHEEZING, COUGHING, SHORTNESS OF BREATH, CHEST TIGHTNESS OR PAIN) WAKE YOU UP AT NIGHT OR EARLIER THAN USUAL IN THE MORNING: NOT AT ALL
QUESTION_5 LAST FOUR WEEKS HOW WOULD YOU RATE YOUR ASTHMA CONTROL: SOMEWHAT CONTROLLED
QUESTION_4 LAST FOUR WEEKS HOW OFTEN HAVE YOU USED YOUR RESCUE INHALER OR NEBULIZER MEDICATION (SUCH AS ALBUTEROL): TWO OR THREE TIMES PER WEEK
ACT_TOTALSCORE: 15
QUESTION_1 LAST FOUR WEEKS HOW MUCH OF THE TIME DID YOUR ASTHMA KEEP YOU FROM GETTING AS MUCH DONE AT WORK, SCHOOL OR AT HOME: SOME OF THE TIME

## 2022-10-04 ENCOUNTER — IMMUNIZATION (OUTPATIENT)
Dept: NURSING | Facility: CLINIC | Age: 57
End: 2022-10-04
Payer: COMMERCIAL

## 2022-10-04 PROCEDURE — 91313 COVID-19,PF,MODERNA BIVALENT: CPT

## 2022-10-04 PROCEDURE — 0134A COVID-19,PF,MODERNA BIVALENT: CPT

## 2022-10-06 ENCOUNTER — OFFICE VISIT (OUTPATIENT)
Dept: FAMILY MEDICINE | Facility: CLINIC | Age: 57
End: 2022-10-06
Payer: COMMERCIAL

## 2022-10-06 VITALS
TEMPERATURE: 98.8 F | WEIGHT: 183.38 LBS | DIASTOLIC BLOOD PRESSURE: 80 MMHG | HEART RATE: 82 BPM | BODY MASS INDEX: 31.31 KG/M2 | SYSTOLIC BLOOD PRESSURE: 130 MMHG | OXYGEN SATURATION: 99 % | RESPIRATION RATE: 18 BRPM | HEIGHT: 64 IN

## 2022-10-06 DIAGNOSIS — N95.2 POST-MENOPAUSAL ATROPHIC VAGINITIS: ICD-10-CM

## 2022-10-06 DIAGNOSIS — J45.40 MODERATE PERSISTENT ASTHMA WITHOUT COMPLICATION: ICD-10-CM

## 2022-10-06 DIAGNOSIS — R07.9 CHEST PAIN, UNSPECIFIED TYPE: ICD-10-CM

## 2022-10-06 DIAGNOSIS — L94.0 CIRCUMSCRIBED SCLERODERMA: ICD-10-CM

## 2022-10-06 DIAGNOSIS — E78.5 HYPERLIPIDEMIA LDL GOAL <130: ICD-10-CM

## 2022-10-06 DIAGNOSIS — R06.09 DOE (DYSPNEA ON EXERTION): ICD-10-CM

## 2022-10-06 DIAGNOSIS — Z00.00 ROUTINE GENERAL MEDICAL EXAMINATION AT A HEALTH CARE FACILITY: Primary | ICD-10-CM

## 2022-10-06 DIAGNOSIS — R53.83 OTHER FATIGUE: ICD-10-CM

## 2022-10-06 DIAGNOSIS — M81.0 AGE-RELATED OSTEOPOROSIS WITHOUT CURRENT PATHOLOGICAL FRACTURE: ICD-10-CM

## 2022-10-06 DIAGNOSIS — J30.89 OTHER ALLERGIC RHINITIS: ICD-10-CM

## 2022-10-06 LAB
ALBUMIN SERPL BCG-MCNC: 4.4 G/DL (ref 3.5–5.2)
ALP SERPL-CCNC: 79 U/L (ref 35–104)
ALT SERPL W P-5'-P-CCNC: 45 U/L (ref 10–35)
ANION GAP SERPL CALCULATED.3IONS-SCNC: 12 MMOL/L (ref 7–15)
AST SERPL W P-5'-P-CCNC: 50 U/L (ref 10–35)
BASOPHILS # BLD AUTO: 0 10E3/UL (ref 0–0.2)
BASOPHILS NFR BLD AUTO: 1 %
BILIRUB SERPL-MCNC: 0.3 MG/DL
BUN SERPL-MCNC: 8 MG/DL (ref 6–20)
CALCIUM SERPL-MCNC: 9.1 MG/DL (ref 8.6–10)
CHLORIDE SERPL-SCNC: 100 MMOL/L (ref 98–107)
CHOLEST SERPL-MCNC: 208 MG/DL
CREAT SERPL-MCNC: 0.76 MG/DL (ref 0.51–0.95)
DEPRECATED HCO3 PLAS-SCNC: 26 MMOL/L (ref 22–29)
EOSINOPHIL # BLD AUTO: 0.2 10E3/UL (ref 0–0.7)
EOSINOPHIL NFR BLD AUTO: 3 %
ERYTHROCYTE [DISTWIDTH] IN BLOOD BY AUTOMATED COUNT: 14.4 % (ref 10–15)
GFR SERPL CREATININE-BSD FRML MDRD: >90 ML/MIN/1.73M2
GLUCOSE SERPL-MCNC: 93 MG/DL (ref 70–99)
HBA1C MFR BLD: 5.4 % (ref 0–5.6)
HCT VFR BLD AUTO: 40.1 % (ref 35–47)
HDLC SERPL-MCNC: 80 MG/DL
HGB BLD-MCNC: 12.9 G/DL (ref 11.7–15.7)
IMM GRANULOCYTES # BLD: 0 10E3/UL
IMM GRANULOCYTES NFR BLD: 0 %
LDLC SERPL CALC-MCNC: 109 MG/DL
LYMPHOCYTES # BLD AUTO: 2.5 10E3/UL (ref 0.8–5.3)
LYMPHOCYTES NFR BLD AUTO: 41 %
MCH RBC QN AUTO: 29 PG (ref 26.5–33)
MCHC RBC AUTO-ENTMCNC: 32.2 G/DL (ref 31.5–36.5)
MCV RBC AUTO: 90 FL (ref 78–100)
MONOCYTES # BLD AUTO: 0.5 10E3/UL (ref 0–1.3)
MONOCYTES NFR BLD AUTO: 8 %
NEUTROPHILS # BLD AUTO: 3 10E3/UL (ref 1.6–8.3)
NEUTROPHILS NFR BLD AUTO: 47 %
NONHDLC SERPL-MCNC: 128 MG/DL
PLATELET # BLD AUTO: 322 10E3/UL (ref 150–450)
POTASSIUM SERPL-SCNC: 3.9 MMOL/L (ref 3.4–5.3)
PROT SERPL-MCNC: 7.9 G/DL (ref 6.4–8.3)
RBC # BLD AUTO: 4.45 10E6/UL (ref 3.8–5.2)
SODIUM SERPL-SCNC: 138 MMOL/L (ref 136–145)
TRIGL SERPL-MCNC: 97 MG/DL
TSH SERPL DL<=0.005 MIU/L-ACNC: 2.45 UIU/ML (ref 0.3–4.2)
WBC # BLD AUTO: 6.3 10E3/UL (ref 4–11)

## 2022-10-06 PROCEDURE — 93000 ELECTROCARDIOGRAM COMPLETE: CPT | Performed by: FAMILY MEDICINE

## 2022-10-06 PROCEDURE — 99214 OFFICE O/P EST MOD 30 MIN: CPT | Mod: 25 | Performed by: FAMILY MEDICINE

## 2022-10-06 PROCEDURE — 90715 TDAP VACCINE 7 YRS/> IM: CPT | Performed by: FAMILY MEDICINE

## 2022-10-06 PROCEDURE — 80050 GENERAL HEALTH PANEL: CPT | Performed by: FAMILY MEDICINE

## 2022-10-06 PROCEDURE — 36415 COLL VENOUS BLD VENIPUNCTURE: CPT | Performed by: FAMILY MEDICINE

## 2022-10-06 PROCEDURE — 90471 IMMUNIZATION ADMIN: CPT | Performed by: FAMILY MEDICINE

## 2022-10-06 PROCEDURE — 83036 HEMOGLOBIN GLYCOSYLATED A1C: CPT | Performed by: FAMILY MEDICINE

## 2022-10-06 PROCEDURE — 80061 LIPID PANEL: CPT | Performed by: FAMILY MEDICINE

## 2022-10-06 PROCEDURE — 99396 PREV VISIT EST AGE 40-64: CPT | Mod: 25 | Performed by: FAMILY MEDICINE

## 2022-10-06 RX ORDER — ATORVASTATIN CALCIUM 40 MG/1
40 TABLET, FILM COATED ORAL DAILY
Qty: 90 TABLET | Refills: 3 | Status: SHIPPED | OUTPATIENT
Start: 2022-10-06 | End: 2023-08-02

## 2022-10-06 RX ORDER — OMEPRAZOLE 40 MG/1
40 CAPSULE, DELAYED RELEASE ORAL DAILY
Qty: 90 CAPSULE | Refills: 3 | Status: SHIPPED | OUTPATIENT
Start: 2022-10-06 | End: 2023-10-16

## 2022-10-06 RX ORDER — FLUTICASONE PROPIONATE AND SALMETEROL 250; 50 UG/1; UG/1
1 POWDER RESPIRATORY (INHALATION) EVERY 12 HOURS
Qty: 180 EACH | Refills: 3 | Status: SHIPPED | OUTPATIENT
Start: 2022-10-06 | End: 2024-01-16

## 2022-10-06 RX ORDER — FLUTICASONE PROPIONATE 50 MCG
2 SPRAY, SUSPENSION (ML) NASAL DAILY
Qty: 48 G | Refills: 4 | Status: SHIPPED | OUTPATIENT
Start: 2022-10-06 | End: 2024-01-15

## 2022-10-06 RX ORDER — IBANDRONATE SODIUM 150 MG/1
150 TABLET, FILM COATED ORAL
Qty: 3 TABLET | Refills: 3 | Status: SHIPPED | OUTPATIENT
Start: 2022-10-06 | End: 2023-10-16

## 2022-10-06 RX ORDER — ESTRADIOL 0.5 MG/1
TABLET ORAL
Qty: 24 TABLET | Refills: 3 | Status: SHIPPED | OUTPATIENT
Start: 2022-10-06 | End: 2024-01-15

## 2022-10-06 RX ORDER — CLOBETASOL PROPIONATE 0.5 MG/G
OINTMENT TOPICAL
Qty: 45 G | Refills: 3 | Status: SHIPPED | OUTPATIENT
Start: 2022-10-06 | End: 2022-10-10

## 2022-10-06 RX ORDER — ALBUTEROL SULFATE 90 UG/1
2 AEROSOL, METERED RESPIRATORY (INHALATION) EVERY 4 HOURS PRN
Qty: 36 G | Refills: 3 | Status: SHIPPED | OUTPATIENT
Start: 2022-10-06 | End: 2024-01-15

## 2022-10-06 ASSESSMENT — ASTHMA QUESTIONNAIRES: ACT_TOTALSCORE: 16

## 2022-10-06 ASSESSMENT — PAIN SCALES - GENERAL: PAINLEVEL: NO PAIN (0)

## 2022-10-06 NOTE — PATIENT INSTRUCTIONS
Echocardiogram and nuclear stress test: [phone]            Preventive Health Recommendations  Female Ages 50 - 64    Yearly exam: See your health care provider every year in order to  Review health changes.   Discuss preventive care.    Review your medicines if your doctor has prescribed any.    Get a Pap test every three years (unless you have an abnormal result and your provider advises testing more often).  If you get Pap tests with HPV test, you only need to test every 5 years, unless you have an abnormal result.   You do not need a Pap test if your uterus was removed (hysterectomy) and you have not had cancer.  You should be tested each year for STDs (sexually transmitted diseases) if you're at risk.   Have a mammogram every 1 to 2 years.  Have a colonoscopy at age 50, or have a yearly FIT test (stool test). These exams screen for colon cancer.    Have a cholesterol test every 5 years, or more often if advised.  Have a diabetes test (fasting glucose) every three years. If you are at risk for diabetes, you should have this test more often.   If you are at risk for osteoporosis (brittle bone disease), think about having a bone density scan (DEXA).    Shots: Get a flu shot each year. Get a tetanus shot every 10 years.    Nutrition:   Eat at least 5 servings of fruits and vegetables each day.  Eat whole-grain bread, whole-wheat pasta and brown rice instead of white grains and rice.  Get adequate Calcium and Vitamin D.     Lifestyle  Exercise at least 150 minutes a week (30 minutes a day, 5 days a week). This will help you control your weight and prevent disease.  Limit alcohol to one drink per day.  No smoking.   Wear sunscreen to prevent skin cancer.   See your dentist every six months for an exam and cleaning.  See your eye doctor every 1 to 2 years.

## 2022-10-06 NOTE — PROGRESS NOTES
SUBJECTIVE:   CC: Jessica is an 57 year old who presents for preventive health visit.       Patient has been advised of split billing requirements and indicates understanding: Yes  Healthy Habits:     Getting at least 3 servings of Calcium per day:  NO    Bi-annual eye exam:  Yes    Dental care twice a year:  Yes    Sleep apnea or symptoms of sleep apnea:  None    Diet:  Regular (no restrictions)    Frequency of exercise:  2-3 days/week    Duration of exercise:  15-30 minutes    Taking medications regularly:  Yes    Medication side effects:  None    PHQ-2 Total Score: 0    Additional concerns today:  Yes      Hyperlipidemia Follow-Up  Atorvastatin 40mg qd    Are you regularly taking any medication or supplement to lower your cholesterol?   Yes- statin    Are you having muscle aches or other side effects that you think could be caused by your cholesterol lowering medication?  No    Asthma Follow-Up  Albuterol 108mcg/ACT 2 puffs q4h prn, advair 250-50mcg/dose bid  Was ACT completed today?    Yes    ACT Total Scores 10/6/2022   ACT TOTAL SCORE -   ASTHMA ER VISITS -   ASTHMA HOSPITALIZATIONS -   ACT TOTAL SCORE (Goal Greater than or Equal to 20) 16   In the past 12 months, how many times did you visit the emergency room for your asthma without being admitted to the hospital? 0   In the past 12 months, how many times were you hospitalized overnight because of your asthma? 0       How many days per week do you miss taking your asthma controller medication?  She usually only takes one dose of Advair daily    Please describe any recent triggers for your asthma: humidity and cold air    Have you had any Emergency Room Visits, Urgent Care Visits, or Hospital Admissions since your last office visit?  No      Today's PHQ-2 Score:   PHQ-2 ( 1999 Pf[employer]) 9/29/2022   Q1: Little interest or pleasure in doing things 0   Q2: Feeling down, depressed or hopeless 0   PHQ-2 Score 0   PHQ-2 Total Score (12-17 Years)- Positive if 3 or  more points; Administer PHQ-A if positive -   Q1: Little interest or pleasure in doing things Not at all   Q2: Feeling down, depressed or hopeless Not at all   PHQ-2 Score 0       Abuse: Current or Past (Physical, Sexual or Emotional) - No  Do you feel safe in your environment? Yes        Social History     Tobacco Use     Smoking status: Former Smoker     Packs/day: 1.00     Years: 28.00     Pack years: 28.00     Types: Cigarettes     Quit date: 2010     Years since quittin.3     Smokeless tobacco: Never Used   Substance Use Topics     Alcohol use: Yes     Comment: occ     If you drink alcohol do you typically have >3 drinks per day or >7 drinks per week? No    Alcohol Use 10/6/2022   Prescreen: >3 drinks/day or >7 drinks/week? -   Prescreen: >3 drinks/day or >7 drinks/week? No       Reviewed orders with patient.  Reviewed health maintenance and updated orders accordingly - Yes  Lab work is in process  Labs reviewed in EPIC    Breast Cancer Screening:    FHS-7:   Breast CA Risk Assessment (FHS-7) 2021 10/8/2021 2022   Did any of your first-degree relatives have breast or ovarian cancer? No Yes No   Did any of your relatives have bilateral breast cancer? No Unknown Unknown   Did any man in your family have breast cancer? No No No   Did any woman in your family have breast and ovarian cancer? No Yes No   Did any woman in your family have breast cancer before age 50 y? No No No   Do you have 2 or more relatives with breast and/or ovarian cancer? No No No   Do you have 2 or more relatives with breast and/or bowel cancer? Yes No No         Pertinent mammograms are reviewed under the imaging tab.    History of abnormal Pap smear: YES - updated in Problem List and Health Maintenance accordingly  PAP / HPV Latest Ref Rng & Units 10/8/2021 2020 2019   PAP   Negative for Intraepithelial Lesion or Malignancy (NILM) - -   PAP (Historical) - - NIL NIL   HPV16 Negative Negative Negative Negative  "  HPV18 Negative Negative Negative Negative   HRHPV Negative Negative Negative Positive(A)     Reviewed and updated as needed this visit by clinical staff   Tobacco  Allergies  Meds  Problems  Med Hx  Surg Hx  Fam Hx  Soc   Hx          Reviewed and updated as needed this visit by Provider      Problems                  Review of Systems  CONSTITUTIONAL: NEGATIVE for fever, chills, change in weight  INTEGUMENTARY/SKIN: NEGATIVE for worrisome rashes, moles or lesions  EYES: NEGATIVE for vision changes or irritation  ENT: NEGATIVE for ear, mouth and throat problems  RESP: NEGATIVE for significant cough or SOB  BREAST: NEGATIVE for masses, tenderness or discharge  CV: DRISCOLL and chest pain with sometimes minimal activity  GI: NEGATIVE for nausea, abdominal pain, heartburn, or change in bowel habits   menopausal female: amenorrhea  MUSCULOSKELETAL: NEGATIVE for significant arthralgias or myalgia  NEURO: NEGATIVE for weakness, dizziness or paresthesias  PSYCHIATRIC: NEGATIVE for changes in mood or affect      OBJECTIVE:   /80   Pulse 82   Temp 98.8  F (37.1  C) (Tympanic)   Resp 18   Ht 1.619 m (5' 3.75\")   Wt 83.2 kg (183 lb 6 oz)   LMP 08/15/2006   SpO2 99%   Breastfeeding No   BMI 31.72 kg/m    Physical Exam  GENERAL: healthy, alert and no distress  NECK: no adenopathy, no asymmetry, masses, or scars and thyroid normal to palpation  RESP: lungs clear to auscultation - no rales, rhonchi or wheezes  BREAST: normal without masses, tenderness or nipple discharge and no palpable axillary masses or adenopathy  CV: regular rate and rhythm, normal S1 S2, no S3 or S4, no murmur, click or rub, no peripheral edema and peripheral pulses strong  ABDOMEN: soft, nontender, no hepatosplenomegaly, no masses and bowel sounds normal  MS: no gross musculoskeletal defects noted, no edema  PSYCH: mentation appears normal, affect normal/bright  LYMPH: no cervical, supraclavicular, axillary, or inguinal " adenopathy    Diagnostic Test Results:  Labs reviewed in Epic    EKG: NSR, no ST/T changes    ASSESSMENT/PLAN:   (Z00.00) Routine general medical examination at a health care facility  (primary encounter diagnosis)  Comment:    Plan: Hemoglobin A1c              (E78.5) Hyperlipidemia LDL goal <130  Comment:    Plan: atorvastatin (LIPITOR) 40 MG tablet, omeprazole        (PRILOSEC) 40 MG DR capsule         Check lipids    (M81.0) Age-related osteoporosis without current pathological fracture  Comment:    Plan: DEXA HIP/PELVIS/SPINE - Future, IBANdronate         (BONIVA) 150 MG tablet        Due for DEXA    (J45.40) Moderate persistent asthma without complication  Comment: only using the advair once a day  Recommend up to twice daily on the controller  Check PFTs     Plan: fluticasone-salmeterol (ADVAIR) 250-50 MCG/ACT         inhaler, albuterol (VENTOLIN HFA) 108 (90 Base)        MCG/ACT inhaler, General PFT Lab (Please always        keep checked)             (J30.89) Other allergic rhinitis  Comment:    Plan: fluticasone (FLONASE) 50 MCG/ACT nasal spray             (N95.2) Post-menopausal atrophic vaginitis  Comment:    Plan: estradiol (ESTRACE) 0.5 MG tablet             (L94.0) Lichen sclerosis et atrophicus  Comment:    Plan: clobetasol (TEMOVATE) 0.05 % external ointment             (R06.09) DRISCOLL (dyspnea on exertion)  Comment: r/o coronary disease, check PFTs  Plan: NM Lexiscan stress test, TSH with free T4         reflex, CBC with platelets and differential,         Echocardiogram Complete, General PFT Lab         (Please always keep checked)             (R07.9) Chest pain, unspecified type  Comment:  As above, r/o coronary disease  Plan: NM Lexiscan stress test, EKG 12-lead complete         w/read - Clinics, Echocardiogram Complete             (R53.83) Other fatigue  Comment:    Plan: TSH with free T4 reflex, Comprehensive         metabolic panel (BMP + Alb, Alk Phos, ALT, AST,        Total. Bili, TP), CBC with  "platelets and         differential               Patient has been advised of split billing requirements and indicates understanding: Yes    COUNSELING:  Reviewed preventive health counseling, as reflected in patient instructions       Regular exercise       Healthy diet/nutrition    Estimated body mass index is 31.72 kg/m  as calculated from the following:    Height as of this encounter: 1.619 m (5' 3.75\").    Weight as of this encounter: 83.2 kg (183 lb 6 oz).    Weight management plan: Discussed healthy diet and exercise guidelines    She reports that she quit smoking about 12 years ago. Her smoking use included cigarettes. She has a 28.00 pack-year smoking history. She has never used smokeless tobacco.      Counseling Resources:  ATP IV Guidelines  Pooled Cohorts Equation Calculator  Breast Cancer Risk Calculator  BRCA-Related Cancer Risk Assessment: FHS-7 Tool  FRAX Risk Assessment  ICSI Preventive Guidelines  Dietary Guidelines for Americans, 2010  USDA's MyPlate  ASA Prophylaxis  Lung CA Screening    Karol Yousif MD  [facility]  "

## 2022-10-06 NOTE — LETTER
My Asthma Action Plan    Name: Francesca Rivera   YOB: 1965  Date: 10/6/2022   My doctor: Karol Yousif MD   My clinic: [facility]        My Rescue Medicine:   Albuterol inhaler (Proair/Ventolin/Proventil HFA)  2-4 puffs EVERY 4 HOURS as needed. Use a spacer if recommended by your provider.   My Asthma Severity:   Intermittent / Exercise Induced  Know your asthma triggers: { :831636}  humidity  cold air          GREEN ZONE   Good Control    I feel good    No cough or wheeze    Can work, sleep and play without asthma symptoms       Take your asthma control medicine every day.     1. If exercise triggers your asthma, take your rescue medication    15 minutes before exercise or sports, and    During exercise if you have asthma symptoms  2. Spacer to use with inhaler: If you have a spacer, make sure to use it with your inhaler             YELLOW ZONE Getting Worse  I have ANY of these:    I do not feel good    Cough or wheeze    Chest feels tight    Wake up at night   1. Keep taking your Green Zone medications  2. Start taking your rescue medicine:    every 20 minutes for up to 1 hour. Then every 4 hours for 24-48 hours.  3. If you stay in the Yellow Zone for more than 12-24 hours, contact your doctor.  4. If you do not return to the Green Zone in 12-24 hours or you get worse, start taking your oral steroid medicine if prescribed by your provider.           RED ZONE Medical Alert - Get Help  I have ANY of these:    I feel awful    Medicine is not helping    Breathing getting harder    Trouble walking or talking    Nose opens wide to breathe       1. Take your rescue medicine NOW  2. If your provider has prescribed an oral steroid medicine, start taking it NOW  3. Call your doctor NOW  4. If you are still in the Red Zone after 20 minutes and you have not reached your doctor:    Take your rescue medicine again and    Call 911 or go to the emergency room right away    See your  regular doctor within 2 weeks of an Emergency Room or Urgent Care visit for follow-up treatment.          Annual Reminders:  Meet with Asthma Educator,  Flu Shot in the Fall, consider Pneumonia Vaccination for patients with asthma (aged 19 and older).    Pharmacy:    [facility] - [place], [place] - [address]  [facility] [place] [facility] [address] AT PORTAL TO [facility] - [facility] [address] [facility]    Electronically signed by Karol Yousif MD   Date: 10/06/22                    Asthma Triggers  How To Control Things That Make Your Asthma Worse    Triggers are things that make your asthma worse.  Look at the list below to help you find your triggers and   what you can do about them. You can help prevent asthma flare-ups by staying away from your triggers.      Trigger                                                          What you can do   Cigarette Smoke  Tobacco smoke can make asthma worse. Do not allow smoking in your home, car or around you.  Be sure no one smokes at a child s day care or school.  If you smoke, ask your health care provider for ways to help you quit.  Ask family members to quit too.  Ask your health care provider for a referral to Quit Plan to help you quit smoking, or call 5-451-181-PLAN.     Colds, Flu, Bronchitis  These are common triggers of asthma. Wash your hands often.  Don t touch your eyes, nose or mouth.  Get a flu shot every year.     Dust Mites  These are tiny bugs that live in cloth or carpet. They are too small to see. Wash sheets and blankets in hot water every week.   Encase pillows and mattress in dust mite proof covers.  Avoid having carpet if you can. If you have carpet, vacuum weekly.   Use a dust mask and HEPA vacuum.   Pollen and Outdoor Mold  Some people are allergic to trees, grass, or weed pollen, or molds. Try to keep your windows closed.  Limit time  out doors when pollen count is high.   Ask you health care provider about taking medicine during allergy season.     Animal Dander  Some people are allergic to skin flakes, urine or saliva from pets with fur or feathers. Keep pets with fur or feathers out of your home.    If you can t keep the pet outdoors, then keep the pet out of your bedroom.  Keep the bedroom door closed.  Keep pets off cloth furniture and away from stuffed toys.     Mice, Rats, and Cockroaches  Some people are allergic to the waste from these pests.   Cover food and garbage.  Clean up spills and food crumbs.  Store grease in the refrigerator.   Keep food out of the bedroom.   Indoor Mold  This can be a trigger if your home has high moisture. Fix leaking faucets, pipes, or other sources of water.   Clean moldy surfaces.  Dehumidify basement if it is damp and smelly.   Smoke, Strong Odors, and Sprays  These can reduce air quality. Stay away from strong odors and sprays, such as perfume, powder, hair spray, paints, smoke incense, paint, cleaning products, candles and new carpet.   Exercise or Sports  Some people with asthma have this trigger. Be active!  Ask your doctor about taking medicine before sports or exercise to prevent symptoms.    Warm up for 5-10 minutes before and after sports or exercise.     Other Triggers of Asthma  Cold air:  Cover your nose and mouth with a scarf.  Sometimes laughing or crying can be a trigger.  Some medicines and food can trigger asthma.

## 2022-10-10 ENCOUNTER — ANCILLARY PROCEDURE (OUTPATIENT)
Dept: MAMMOGRAPHY | Facility: CLINIC | Age: 57
End: 2022-10-10
Attending: FAMILY MEDICINE
Payer: COMMERCIAL

## 2022-10-10 DIAGNOSIS — Z12.31 VISIT FOR SCREENING MAMMOGRAM: ICD-10-CM

## 2022-10-10 PROCEDURE — 77067 SCR MAMMO BI INCL CAD: CPT | Mod: TC | Performed by: RADIOLOGY

## 2022-11-01 ENCOUNTER — HOSPITAL ENCOUNTER (OUTPATIENT)
Dept: RESPIRATORY THERAPY | Facility: CLINIC | Age: 57
Discharge: HOME | End: 2022-11-01
Attending: FAMILY MEDICINE | Admitting: FAMILY MEDICINE
Payer: COMMERCIAL

## 2022-11-01 DIAGNOSIS — R06.09 DOE (DYSPNEA ON EXERTION): ICD-10-CM

## 2022-11-01 DIAGNOSIS — J45.40 MODERATE PERSISTENT ASTHMA WITHOUT COMPLICATION: ICD-10-CM

## 2022-11-01 LAB
DLCOCOR-%PRED-PRE: 105 %
DLCOCOR-PRE: 21.43 ML/MIN/MMHG
DLCOUNC-%PRED-PRE: 103 %
DLCOUNC-PRE: 21.1 ML/MIN/MMHG
DLCOUNC-PRED: 20.38 ML/MIN/MMHG
ERV-%PRED-PRE: 56 %
ERV-PRE: 0.34 L
ERV-PRED: 0.6 L
EXPTIME-PRE: 6.23 SEC
FEF2575-%PRED-POST: 124 %
FEF2575-%PRED-PRE: 117 %
FEF2575-POST: 3.03 L/SEC
FEF2575-PRE: 2.86 L/SEC
FEF2575-PRED: 2.44 L/SEC
FEFMAX-%PRED-PRE: 84 %
FEFMAX-PRE: 5.44 L/SEC
FEFMAX-PRED: 6.46 L/SEC
FEV1-%PRED-PRE: 102 %
FEV1-PRE: 2.66 L
FEV1FEV6-PRE: 82 %
FEV1FEV6-PRED: 81 %
FEV1FVC-PRE: 82 %
FEV1FVC-PRED: 80 %
FEV1SVC-PRE: 76 %
FEV1SVC-PRED: 78 %
FIFMAX-PRE: 3.41 L/SEC
FRCPLETH-%PRED-PRE: 114 %
FRCPLETH-PRE: 3.07 L
FRCPLETH-PRED: 2.68 L
FVC-%PRED-PRE: 99 %
FVC-PRE: 3.23 L
FVC-PRED: 3.26 L
IC-%PRED-PRE: 116 %
IC-PRE: 3.17 L
IC-PRED: 2.72 L
RVPLETH-%PRED-PRE: 149 %
RVPLETH-PRE: 2.73 L
RVPLETH-PRED: 1.83 L
TLCPLETH-%PRED-PRE: 127 %
TLCPLETH-PRE: 6.24 L
TLCPLETH-PRED: 4.9 L
VA-%PRED-PRE: 111 %
VA-PRE: 5.34 L
VC-%PRED-PRE: 105 %
VC-PRE: 3.51 L
VC-PRED: 3.32 L

## 2022-11-01 PROCEDURE — 94060 EVALUATION OF WHEEZING: CPT

## 2022-11-01 PROCEDURE — 94729 DIFFUSING CAPACITY: CPT

## 2022-11-01 PROCEDURE — 94726 PLETHYSMOGRAPHY LUNG VOLUMES: CPT

## 2022-11-03 ENCOUNTER — TELEPHONE (OUTPATIENT)
Dept: CARDIOLOGY | Facility: CLINIC | Age: 57
End: 2022-11-03

## 2022-11-16 ENCOUNTER — HOSPITAL ENCOUNTER (OUTPATIENT)
Dept: BONE DENSITY | Facility: CLINIC | Age: 57
Discharge: HOME | End: 2022-11-16
Attending: FAMILY MEDICINE | Admitting: FAMILY MEDICINE
Payer: COMMERCIAL

## 2022-11-16 DIAGNOSIS — M81.0 AGE-RELATED OSTEOPOROSIS WITHOUT CURRENT PATHOLOGICAL FRACTURE: ICD-10-CM

## 2022-11-16 PROCEDURE — 77080 DXA BONE DENSITY AXIAL: CPT

## 2022-11-23 ENCOUNTER — HOSPITAL ENCOUNTER (OUTPATIENT)
Dept: CARDIOLOGY | Facility: CLINIC | Age: 57
Discharge: HOME | End: 2022-11-23
Attending: FAMILY MEDICINE
Payer: COMMERCIAL

## 2022-11-23 ENCOUNTER — HOSPITAL ENCOUNTER (OUTPATIENT)
Dept: NUCLEAR MEDICINE | Facility: CLINIC | Age: 57
Setting detail: NUCLEAR MEDICINE
Discharge: HOME | End: 2022-11-23
Attending: FAMILY MEDICINE
Payer: COMMERCIAL

## 2022-11-23 VITALS — BODY MASS INDEX: 31.24 KG/M2 | WEIGHT: 183 LBS | HEIGHT: 64 IN

## 2022-11-23 DIAGNOSIS — R07.9 CHEST PAIN, UNSPECIFIED TYPE: ICD-10-CM

## 2022-11-23 DIAGNOSIS — R06.09 DOE (DYSPNEA ON EXERTION): ICD-10-CM

## 2022-11-23 LAB — LVEF ECHO: NORMAL

## 2022-11-23 PROCEDURE — 93016 CV STRESS TEST SUPVJ ONLY: CPT | Performed by: INTERNAL MEDICINE

## 2022-11-23 PROCEDURE — A9502 TC99M TETROFOSMIN: HCPCS | Performed by: FAMILY MEDICINE

## 2022-11-23 PROCEDURE — 255N000002 HC RX 255 OP 636: Performed by: FAMILY MEDICINE

## 2022-11-23 PROCEDURE — 78452 HT MUSCLE IMAGE SPECT MULT: CPT

## 2022-11-23 PROCEDURE — 93018 CV STRESS TEST I&R ONLY: CPT | Performed by: INTERNAL MEDICINE

## 2022-11-23 PROCEDURE — 93017 CV STRESS TEST TRACING ONLY: CPT

## 2022-11-23 PROCEDURE — 93306 TTE W/DOPPLER COMPLETE: CPT | Mod: 26 | Performed by: INTERNAL MEDICINE

## 2022-11-23 PROCEDURE — 78452 HT MUSCLE IMAGE SPECT MULT: CPT | Mod: 26 | Performed by: INTERNAL MEDICINE

## 2022-11-23 PROCEDURE — 343N000001 HC RX 343: Performed by: FAMILY MEDICINE

## 2022-11-23 PROCEDURE — 999N000208 ECHOCARDIOGRAM COMPLETE

## 2022-11-23 PROCEDURE — 250N000011 HC RX IP 250 OP 636: Performed by: FAMILY MEDICINE

## 2022-11-23 RX ORDER — REGADENOSON 0.08 MG/ML
0.4 INJECTION, SOLUTION INTRAVENOUS ONCE
Status: COMPLETED | OUTPATIENT
Start: 2022-11-23 | End: 2022-11-23

## 2022-11-23 RX ADMIN — HUMAN ALBUMIN MICROSPHERES AND PERFLUTREN 2 ML: 10; .22 INJECTION, SOLUTION INTRAVENOUS at 11:41

## 2022-11-23 RX ADMIN — TETROFOSMIN 30.8 MCI.: 1.38 INJECTION, POWDER, LYOPHILIZED, FOR SOLUTION INTRAVENOUS at 13:50

## 2022-11-23 RX ADMIN — REGADENOSON 0.4 MG: 0.08 INJECTION, SOLUTION INTRAVENOUS at 13:50

## 2022-11-23 RX ADMIN — TETROFOSMIN 10.8 MCI.: 1.38 INJECTION, POWDER, LYOPHILIZED, FOR SOLUTION INTRAVENOUS at 11:45

## 2022-11-25 LAB
CV STRESS MAX HR HE: 96
NUC STRESS EJECTION FRACTION: 67 %
RATE PRESSURE PRODUCT: NORMAL
STRESS ECHO BASELINE DIASTOLIC HE: 88
STRESS ECHO BASELINE HR: 72 BPM
STRESS ECHO BASELINE SYSTOLIC BP: 140
STRESS ECHO CALCULATED PERCENT HR: 59 %
STRESS ECHO LAST STRESS DIASTOLIC BP: 88
STRESS ECHO LAST STRESS SYSTOLIC BP: 138
STRESS ECHO TARGET HR: 164
STRESS ST DEPRESSION: 0.5 MM

## 2022-11-28 DIAGNOSIS — R94.39 ABNORMAL STRESS ECG: Primary | ICD-10-CM

## 2022-11-30 ENCOUNTER — OFFICE VISIT (OUTPATIENT)
Dept: CARDIOLOGY | Facility: CLINIC | Age: 57
End: 2022-11-30
Attending: FAMILY MEDICINE
Payer: COMMERCIAL

## 2022-11-30 VITALS
TEMPERATURE: 98.7 F | DIASTOLIC BLOOD PRESSURE: 87 MMHG | HEART RATE: 88 BPM | OXYGEN SATURATION: 97 % | BODY MASS INDEX: 31.34 KG/M2 | WEIGHT: 182.6 LBS | SYSTOLIC BLOOD PRESSURE: 132 MMHG

## 2022-11-30 DIAGNOSIS — R94.39 ABNORMAL STRESS ECG: ICD-10-CM

## 2022-11-30 PROCEDURE — 99204 OFFICE O/P NEW MOD 45 MIN: CPT | Performed by: INTERNAL MEDICINE

## 2022-11-30 NOTE — PROGRESS NOTES
Cardiology Consultation     Date of Admission:  (Not on file)    Assessment & Plan     1.  Exertional shortness of breath with cardiology diagnostic testing as below  2.  Stress myocardial perfusion study with probable breast attenuation artifact in the anterior apical segments  3.  Preserved LV systolic function with no valvular heart disease  4.  Elevated BMI  5.  Hyperlipidemia  6.  Gastroesophageal reflux disease  7.  Elevated calcium score of 111 in 2020  8.  Asthma      Recommendations    1.  Exertional shortness of breath: Work-up has ensued including pulmonary function tests and echocardiogram along with Lexiscan.  After reviewing the testing we have agreed for further clarification of her coronary anatomy by ordering a coronary CTA.  She will schedule this accordingly.    2.  Patient has gained weight over the last year and certainly deconditioning could be playing a role in her symptoms.    3.  Patient will return to clinic in 1 month's time with my [facility] colleague.  Adjustments in her cholesterol regimen may need to be made given her recent elevated LDL last month.    Thank you kindly for consult      Marjorie Shen MD, MD        HPI:      Patient is a pleasant 57-year-old woman who presents to establish local cardiac care.  She has had a coronary calcium score in the past that was elevated at 111 and she had been placed on appropriate medical therapy at that time.  More recently since snf patient has reported of some shortness of breath although she has gained weight during this timeframe.  She reports of activities bringing her symptoms on.  She feels deconditioned.  Periodically at random times she will feel a chest pressure sensation that lasts a few seconds and subsides on its own.  Occurs with no warning and no aggravating or precipitating factors.  No relieving factors and the symptoms subside on their own.  A work-up has ensued including an EKG which demonstrates normal sinus  rhythm.  She had an echocardiogram demonstrating preserved LV systolic function with no valvular abnormalities.  She had a stress nuclear myocardial perfusion study which was a Nela scan.  EKG portion demonstrated 0.5 mm ST segment changes in V3 V4 and V5 which is nondiagnostic.  In addition of this she had breast attenuation artifact with a small area ischemia noted in the anterior and apical segments of the left ventricle.  Ejection fraction was preserved consistent with her prior studies.  Here to go over findings and plan for additional care.        MPI: 2022       The nuclear stress test is probably abnormal.     There is a small area of ischemia in the anterior and apical segment(s) of the left ventricle. There is breast tissue attenuation in this area reducing specificity of these findings.     Left ventricular function is normal.     The left ventricular ejection fraction at stress is 67%.     There is no prior study for comparison.    Echo 2022    The left ventricle is normal in structure, function and size.  The visual ejection fraction is 55-60%.  The right ventricle is normal in structure, function and size.  Right ventricle systolic pressure estimate normal  Doppler interrogation does not demonstrate signficant stenosis or  insufficiency involving cardiac vavles     Coronary Calcium Score 2020: 111    Primary Care Physician   Karol Yousif      Patient Active Problem List   Diagnosis     Mild intermittent asthma     Allergic rhinitis     Esophageal reflux     Asymptomatic postmenopausal status     Post-menopausal atrophic vaginitis     Lichen sclerosis et atrophicus     Seasonal Moderate persistent asthma     Age-related osteoporosis without current pathological fracture     Cervical high risk HPV (human papillomavirus) test positive     Hyperlipidemia LDL goal <100       Past Medical History   I have reviewed this patient's medical history and updated it with pertinent information if needed.   Past  Medical History:   Diagnosis Date     Asthma      Cervical high risk HPV (human papillomavirus) test positive 06/07/2019    See problem list     Tobacco use disorder 12/29/2005       Past Surgical History   I have reviewed this patient's surgical history and updated it with pertinent information if needed.  Past Surgical History:   Procedure Laterality Date     COLONOSCOPY N/A 5/20/2016    Procedure: COLONOSCOPY;  Surgeon: Darell Mayes MD;  Location: [facility]     COLONOSCOPY N/A 11/3/2021    Procedure: COLONOSCOPY, FLEXIBLE, WITH LESION REMOVAL USING SNARE;  Surgeon: Chris Alicia DO;  Location: [facility]     TUBAL LIGATION         Prior to Admission Medications   Cannot display prior to admission medications because the patient has not been admitted in this contact.     [unfilled]  [unfilled]  Allergies   Allergies   Allergen Reactions     Nka [No Known Allergies]        Social History    reports that she quit smoking about 12 years ago. Her smoking use included cigarettes. She has a 28.00 pack-year smoking history. She has never used smokeless tobacco. She reports current alcohol use. She reports that she does not use drugs.    Family History   Family History   Problem Relation Age of Onset     Alcohol/Drug Father      Diabetes Father      Hypertension Father      Psychotic Disorder Father         bipolar     Asthma Father      Cancer - colorectal Father 60     Depression Father      Cancer Maternal Grandfather         lung     Arthritis Maternal Grandfather      Cancer Paternal Grandmother         stomach     Psychotic Disorder Paternal Grandmother         bipolar     Depression Paternal Grandmother      Arthritis Paternal Grandmother      Breast Cancer Maternal Grandmother      Cancer - colorectal Maternal Grandmother      Arthritis Maternal Grandmother      Depression Daughter      Lupus Mother      C.A.D. Paternal Grandfather      Cerebrovascular Disease Paternal Grandfather      Arthritis  Paternal Grandfather      Depression Son        Review of Systems   The comprehensive 10 point Review of Systems is negative other than noted in the HPI or here.     Physical Exam   Vital Signs with Ranges     Wt Readings from Last 4 Encounters:   11/23/22 83 kg (183 lb)   10/06/22 83.2 kg (183 lb 6 oz)   11/16/21 78.9 kg (174 lb)   11/03/21 76.7 kg (169 lb)     [unfilled]      Vitals: LMP 08/15/2006     /87   Pulse 88   Temp 98.7  F (37.1  C) (Tympanic)   Wt 82.8 kg (182 lb 9.6 oz)   LMP 08/15/2006   SpO2 97%   BMI 31.34 kg/m      GENERAL: Healthy, alert and no distress  EYES: Eyes grossly normal to inspection.  No discharge or erythema, or obvious scleral/conjunctival abnormalities.  RESP: No audible wheeze, cough, or visible cyanosis.  No visible retractions or increased work of breathing.    CV:  RRR  Lungs: CTA B  Abdomen: + BS Soft, NT  Ext: no edema  SKIN: Visible skin clear. No significant rash, abnormal pigmentation or lesions.  NEURO: Cranial nerves grossly intact.  Mentation and speech appropriate for age.  PSYCH: Mentation appears normal, affect normal/bright, judgement and insight intact, normal speech and appearance well-groomed.

## 2022-11-30 NOTE — LETTER
11/30/2022    Karol Yousif MD  [address]  [facility] [address]    RE: Francesca Rivera       Dear Colleague,     I had the pleasure of seeing Francesca Rivera in the [facility].      Cardiology Consultation     Date of Admission:  (Not on file)    Assessment & Plan     1.  Exertional shortness of breath with cardiology diagnostic testing as below  2.  Stress myocardial perfusion study with probable breast attenuation artifact in the anterior apical segments  3.  Preserved LV systolic function with no valvular heart disease  4.  Elevated BMI  5.  Hyperlipidemia  6.  Gastroesophageal reflux disease  7.  Elevated calcium score of 111 in 2020  8.  Asthma      Recommendations    1.  Exertional shortness of breath: Work-up has ensued including pulmonary function tests and echocardiogram along with Lexiscan.  After reviewing the testing we have agreed for further clarification of her coronary anatomy by ordering a coronary CTA.  She will schedule this accordingly.    2.  Patient has gained weight over the last year and certainly deconditioning could be playing a role in her symptoms.    3.  Patient will return to clinic in 1 month's time with my [facility] colleague.  Adjustments in her cholesterol regimen may need to be made given her recent elevated LDL last month.    Thank you kindly for consult      Marjorie Shen MD, MD        HPI:      Patient is a pleasant 57-year-old woman who presents to establish local cardiac care.  She has had a coronary calcium score in the past that was elevated at 111 and she had been placed on appropriate medical therapy at that time.  More recently since assisted patient has reported of some shortness of breath although she has gained weight during this timeframe.  She reports of activities bringing her symptoms on.  She feels deconditioned.  Periodically at random times she will feel a chest pressure sensation that lasts a few seconds and subsides on  its own.  Occurs with no warning and no aggravating or precipitating factors.  No relieving factors and the symptoms subside on their own.  A work-up has ensued including an EKG which demonstrates normal sinus rhythm.  She had an echocardiogram demonstrating preserved LV systolic function with no valvular abnormalities.  She had a stress nuclear myocardial perfusion study which was a Nela scan.  EKG portion demonstrated 0.5 mm ST segment changes in V3 V4 and V5 which is nondiagnostic.  In addition of this she had breast attenuation artifact with a small area ischemia noted in the anterior and apical segments of the left ventricle.  Ejection fraction was preserved consistent with her prior studies.  Here to go over findings and plan for additional care.        MPI: 2022       The nuclear stress test is probably abnormal.     There is a small area of ischemia in the anterior and apical segment(s) of the left ventricle. There is breast tissue attenuation in this area reducing specificity of these findings.     Left ventricular function is normal.     The left ventricular ejection fraction at stress is 67%.     There is no prior study for comparison.    Echo 2022    The left ventricle is normal in structure, function and size.  The visual ejection fraction is 55-60%.  The right ventricle is normal in structure, function and size.  Right ventricle systolic pressure estimate normal  Doppler interrogation does not demonstrate signficant stenosis or  insufficiency involving cardiac vavles     Coronary Calcium Score 2020: 111    Primary Care Physician   Karol Yousif      Patient Active Problem List   Diagnosis     Mild intermittent asthma     Allergic rhinitis     Esophageal reflux     Asymptomatic postmenopausal status     Post-menopausal atrophic vaginitis     Lichen sclerosis et atrophicus     Seasonal Moderate persistent asthma     Age-related osteoporosis without current pathological fracture     Cervical high risk  HPV (human papillomavirus) test positive     Hyperlipidemia LDL goal <100       Past Medical History   I have reviewed this patient's medical history and updated it with pertinent information if needed.   Past Medical History:   Diagnosis Date     Asthma      Cervical high risk HPV (human papillomavirus) test positive 06/07/2019    See problem list     Tobacco use disorder 12/29/2005       Past Surgical History   I have reviewed this patient's surgical history and updated it with pertinent information if needed.  Past Surgical History:   Procedure Laterality Date     COLONOSCOPY N/A 5/20/2016    Procedure: COLONOSCOPY;  Surgeon: Darell Mayes MD;  Location: [facility]     COLONOSCOPY N/A 11/3/2021    Procedure: COLONOSCOPY, FLEXIBLE, WITH LESION REMOVAL USING SNARE;  Surgeon: Chris Alicia DO;  Location: [facility]     TUBAL LIGATION         Prior to Admission Medications   Cannot display prior to admission medications because the patient has not been admitted in this contact.     [unfilled]  [unfilled]  Allergies   Allergies   Allergen Reactions     Nka [No Known Allergies]        Social History    reports that she quit smoking about 12 years ago. Her smoking use included cigarettes. She has a 28.00 pack-year smoking history. She has never used smokeless tobacco. She reports current alcohol use. She reports that she does not use drugs.    Family History   Family History   Problem Relation Age of Onset     Alcohol/Drug Father      Diabetes Father      Hypertension Father      Psychotic Disorder Father         bipolar     Asthma Father      Cancer - colorectal Father 60     Depression Father      Cancer Maternal Grandfather         lung     Arthritis Maternal Grandfather      Cancer Paternal Grandmother         stomach     Psychotic Disorder Paternal Grandmother         bipolar     Depression Paternal Grandmother      Arthritis Paternal Grandmother      Breast Cancer Maternal Grandmother      Cancer -  colorectal Maternal Grandmother      Arthritis Maternal Grandmother      Depression Daughter      Lupus Mother      C.A.D. Paternal Grandfather      Cerebrovascular Disease Paternal Grandfather      Arthritis Paternal Grandfather      Depression Son        Review of Systems   The comprehensive 10 point Review of Systems is negative other than noted in the HPI or here.     Physical Exam   Vital Signs with Ranges     Wt Readings from Last 4 Encounters:   11/23/22 83 kg (183 lb)   10/06/22 83.2 kg (183 lb 6 oz)   11/16/21 78.9 kg (174 lb)   11/03/21 76.7 kg (169 lb)     [unfilled]      Vitals: LMP 08/15/2006     /87   Pulse 88   Temp 98.7  F (37.1  C) (Tympanic)   Wt 82.8 kg (182 lb 9.6 oz)   LMP 08/15/2006   SpO2 97%   BMI 31.34 kg/m      GENERAL: Healthy, alert and no distress  EYES: Eyes grossly normal to inspection.  No discharge or erythema, or obvious scleral/conjunctival abnormalities.  RESP: No audible wheeze, cough, or visible cyanosis.  No visible retractions or increased work of breathing.    CV:  RRR  Lungs: CTA B  Abdomen: + BS Soft, NT  Ext: no edema  SKIN: Visible skin clear. No significant rash, abnormal pigmentation or lesions.  NEURO: Cranial nerves grossly intact.  Mentation and speech appropriate for age.  PSYCH: Mentation appears normal, affect normal/bright, judgement and insight intact, normal speech and appearance well-groomed.    Thank you for allowing me to participate in the care of your patient.      Sincerely,     Marjorie Shen MD     [facility]  cc:   Karol Yousif MD  [address] [place] [address]

## 2022-12-20 ENCOUNTER — HOSPITAL ENCOUNTER (OUTPATIENT)
Dept: CARDIOLOGY | Facility: CLINIC | Age: 57
Discharge: HOME | End: 2022-12-20
Attending: INTERNAL MEDICINE
Payer: COMMERCIAL

## 2022-12-20 VITALS — SYSTOLIC BLOOD PRESSURE: 135 MMHG | HEART RATE: 80 BPM | DIASTOLIC BLOOD PRESSURE: 83 MMHG

## 2022-12-20 DIAGNOSIS — R94.39 ABNORMAL STRESS ECG: ICD-10-CM

## 2022-12-20 PROCEDURE — 250N000011 HC RX IP 250 OP 636: Performed by: INTERNAL MEDICINE

## 2022-12-20 PROCEDURE — 75574 CT ANGIO HRT W/3D IMAGE: CPT

## 2022-12-20 PROCEDURE — 250N000013 HC RX MED GY IP 250 OP 250 PS 637: Performed by: INTERNAL MEDICINE

## 2022-12-20 PROCEDURE — 75574 CT ANGIO HRT W/3D IMAGE: CPT | Mod: 26 | Performed by: INTERNAL MEDICINE

## 2022-12-20 RX ORDER — IVABRADINE 5 MG/1
5-15 TABLET, FILM COATED ORAL
Status: COMPLETED | OUTPATIENT
Start: 2022-12-20 | End: 2022-12-20

## 2022-12-20 RX ORDER — IOPAMIDOL 755 MG/ML
110 INJECTION, SOLUTION INTRAVASCULAR ONCE
Status: COMPLETED | OUTPATIENT
Start: 2022-12-20 | End: 2022-12-20

## 2022-12-20 RX ORDER — NITROGLYCERIN 0.4 MG/1
0.4 TABLET SUBLINGUAL
Status: DISCONTINUED | OUTPATIENT
Start: 2022-12-20 | End: 2022-12-21 | Stop reason: HOSPADM

## 2022-12-20 RX ORDER — METHYLPREDNISOLONE SODIUM SUCCINATE 125 MG/2ML
125 INJECTION, POWDER, LYOPHILIZED, FOR SOLUTION INTRAMUSCULAR; INTRAVENOUS
Status: DISCONTINUED | OUTPATIENT
Start: 2022-12-20 | End: 2022-12-21 | Stop reason: HOSPADM

## 2022-12-20 RX ORDER — DIPHENHYDRAMINE HYDROCHLORIDE 50 MG/ML
25-50 INJECTION INTRAMUSCULAR; INTRAVENOUS
Status: DISCONTINUED | OUTPATIENT
Start: 2022-12-20 | End: 2022-12-21 | Stop reason: HOSPADM

## 2022-12-20 RX ORDER — METOPROLOL TARTRATE 25 MG/1
25-100 TABLET, FILM COATED ORAL
Status: COMPLETED | OUTPATIENT
Start: 2022-12-20 | End: 2022-12-20

## 2022-12-20 RX ORDER — METOPROLOL TARTRATE 1 MG/ML
5-15 INJECTION, SOLUTION INTRAVENOUS
Status: DISCONTINUED | OUTPATIENT
Start: 2022-12-20 | End: 2022-12-21 | Stop reason: HOSPADM

## 2022-12-20 RX ORDER — DIPHENHYDRAMINE HCL 25 MG
25 CAPSULE ORAL
Status: DISCONTINUED | OUTPATIENT
Start: 2022-12-20 | End: 2022-12-21 | Stop reason: HOSPADM

## 2022-12-20 RX ORDER — DILTIAZEM HYDROCHLORIDE 5 MG/ML
10-15 INJECTION INTRAVENOUS
Status: DISCONTINUED | OUTPATIENT
Start: 2022-12-20 | End: 2022-12-21 | Stop reason: HOSPADM

## 2022-12-20 RX ORDER — ACYCLOVIR 200 MG/1
0-1 CAPSULE ORAL
Status: DISCONTINUED | OUTPATIENT
Start: 2022-12-20 | End: 2022-12-21 | Stop reason: HOSPADM

## 2022-12-20 RX ORDER — ONDANSETRON 2 MG/ML
4 INJECTION INTRAMUSCULAR; INTRAVENOUS
Status: DISCONTINUED | OUTPATIENT
Start: 2022-12-20 | End: 2022-12-21 | Stop reason: HOSPADM

## 2022-12-20 RX ORDER — DILTIAZEM HCL 60 MG
120 TABLET ORAL
Status: DISCONTINUED | OUTPATIENT
Start: 2022-12-20 | End: 2022-12-21 | Stop reason: HOSPADM

## 2022-12-20 RX ADMIN — NITROGLYCERIN 0.4 MG: 0.4 TABLET SUBLINGUAL at 13:48

## 2022-12-20 RX ADMIN — IOPAMIDOL 110 ML: 755 INJECTION, SOLUTION INTRAVENOUS at 14:04

## 2022-12-20 RX ADMIN — IVABRADINE 15 MG: 5 TABLET, FILM COATED ORAL at 12:35

## 2022-12-20 RX ADMIN — METOPROLOL TARTRATE 100 MG: 50 TABLET, FILM COATED ORAL at 12:35

## 2022-12-21 ENCOUNTER — OFFICE VISIT (OUTPATIENT)
Dept: CARDIOLOGY | Facility: CLINIC | Age: 57
End: 2022-12-21
Attending: INTERNAL MEDICINE
Payer: COMMERCIAL

## 2022-12-21 VITALS
OXYGEN SATURATION: 96 % | DIASTOLIC BLOOD PRESSURE: 82 MMHG | SYSTOLIC BLOOD PRESSURE: 127 MMHG | BODY MASS INDEX: 31.41 KG/M2 | WEIGHT: 183 LBS | RESPIRATION RATE: 20 BRPM | HEART RATE: 64 BPM

## 2022-12-21 DIAGNOSIS — I25.10 NONOBSTRUCTIVE ATHEROSCLEROSIS OF CORONARY ARTERY: ICD-10-CM

## 2022-12-21 DIAGNOSIS — R94.39 ABNORMAL STRESS ECG: Primary | ICD-10-CM

## 2022-12-21 DIAGNOSIS — E78.5 HYPERLIPIDEMIA LDL GOAL <70: ICD-10-CM

## 2022-12-21 PROCEDURE — 99214 OFFICE O/P EST MOD 30 MIN: CPT | Performed by: NURSE PRACTITIONER

## 2022-12-21 RX ORDER — METOPROLOL SUCCINATE 25 MG/1
25 TABLET, EXTENDED RELEASE ORAL DAILY
Qty: 90 TABLET | Refills: 3 | Status: SHIPPED | OUTPATIENT
Start: 2022-12-21 | End: 2024-01-15

## 2022-12-21 RX ORDER — EZETIMIBE 10 MG/1
10 TABLET ORAL DAILY
Qty: 90 TABLET | Refills: 3 | Status: SHIPPED | OUTPATIENT
Start: 2022-12-21 | End: 2023-08-02

## 2022-12-21 NOTE — PROGRESS NOTES
Cardiology Clinic Progress Note  Francesca Rivera MRN# [id number]   YOB: 1965 Age: 57 year old      Primary Cardiologist:   Dr. Shen          History of Presenting Illness:      Francesca Rivera is a pleasant 57 year old patient with a past cardiac history significant for   1. Nonobstructive CAD   2. Calcium score 111 2020  3. Lexiscan abnormal  4. CT coronary angiogram 12/2022 distal LAD is intramyocardial and small vessel  5. Hyperlipidemia  Past medical history significant for GERD, asthma, elevated BMI    She has a history of elevated coronary calcium score at 111 in 2020 and was placed on medical therapy at that time.    Patient was seen by Dr. Shen in November 2022 in consultation.  Since retiring she reported shortness of breath but had also gained weight.  She described random chest pressure lasting a few seconds and subsiding spontaneously.  Echocardiogram showed normal EF and no significant valvular disease.  Lexiscan nuclear stress test was nondiagnostic and included breast attenuation with a small area of ischemia anteriorly.  He recommended proceeding with CT coronary angiogram.    Pt presents today for testing follow-up. CT coronary angiogram 12/20/2022 with total calcium score 178, mild disease LAD, trivial RCA and LCx.  They noted that the distal LAD is intramyocardial and the remainder of the vessel was small in caliber and difficult to assess. Lipid profile October 2022 is elevated with .  ALT mildly elevated at 45.  Results reviewed today.     She continues with stable chest discomfort and dyspnea on exertion.  Again, the chest discomfort lasts for few seconds and resolves spontaneously.  Blood pressure today is controlled.  We discussed Mediterranean diet and she is agreeable to adding zetia.  In the future, if ALT normalizes we can consider increasing statin. Patient reports no PND, orthopnea, presyncope, syncope, edema, heart racing, or palpitations.    Labs:  LIPID  RESULTS:  Lab Results   Component Value Date    CHOL 208 (H) 10/06/2022    CHOL 287 (H) 09/02/2020    HDL 80 10/06/2022    HDL 83 09/02/2020     (H) 10/06/2022     (H) 09/02/2020    TRIG 97 10/06/2022    TRIG 86 09/02/2020    CHOLHDLRATIO 3.0 03/20/2012       LIVER ENZYME RESULTS:  Lab Results   Component Value Date    AST 50 (H) 10/06/2022    AST 30 11/08/2007    ALT 45 (H) 10/06/2022    ALT 16 11/08/2007       CBC RESULTS:  Lab Results   Component Value Date    WBC 6.3 10/06/2022    WBC 6.9 07/22/2020    RBC 4.45 10/06/2022    RBC 4.29 07/22/2020    HGB 12.9 10/06/2022    HGB 12.8 07/22/2020    HCT 40.1 10/06/2022    HCT 39.7 07/22/2020    MCV 90 10/06/2022    MCV 93 07/22/2020    MCH 29.0 10/06/2022    MCH 29.8 07/22/2020    MCHC 32.2 10/06/2022    MCHC 32.2 07/22/2020    RDW 14.4 10/06/2022    RDW 14.4 07/22/2020     10/06/2022     07/22/2020       BMP RESULTS:  Lab Results   Component Value Date     10/06/2022     07/22/2020    POTASSIUM 3.9 10/06/2022    POTASSIUM 4.0 07/22/2020    CHLORIDE 100 10/06/2022    CHLORIDE 109 07/22/2020    CO2 26 10/06/2022    CO2 27 07/22/2020    ANIONGAP 12 10/06/2022    ANIONGAP 4 07/22/2020    GLC 93 10/06/2022    GLC 93 10/08/2021     (H) 07/22/2020    BUN 8.0 10/06/2022    BUN 17 07/22/2020    CR 0.76 10/06/2022    CR 0.64 07/22/2020    GFRESTIMATED >90 10/06/2022    GFRESTIMATED >90 07/22/2020    GFRESTBLACK >90 07/22/2020    JACKELINE 9.1 10/06/2022    JACKELINE 8.8 07/22/2020        A1C RESULTS:  Lab Results   Component Value Date    A1C 5.4 10/06/2022    A1C 5.4 09/02/2020       INR RESULTS:  No results found for: INR    Results reviewed today.       Current Cardiac Medications     Atorvastatin 40 mg daily                     Assessment and Plan:       Plan    Patient Instructions   Medication Changes:  6. START zetia 10 mg daily   7. START metoprolol XL 25 mg daily, can take in evening if fatigued   8. START aspirin 81 mg  daily    Recommendations:  1. Call if heart rate is less than 50 or less than 55 and lightheaded.  2. Call if blood pressure is less than 90 on top or less than 100 with lightheadedness.         Follow-up:  1. Fasting lab in 2 months (lipid)   2. See georgiana ELDRIDGE for cardiology follow up at [place]: 1 month.   Call 6 months prior, to schedule.     Cardiology Scheduling~[phone]  Cardiology Clinic RN~[phone] (Jamia RN, Jessica RN, Tessie RN)            1. CAD     Ramos, chest pain    Continue statin, start aspirin and beta-blocker    Consider adding CCB or Ranexa, if needed      2. Hyperlipidemia    Last  10/2022    Continue atorvastatin 40 mg daily and start Zetia    Consider increasing statin if ALT normalizes or monitoring closely               Thank you for allowing me to participate in this delightful patient's care.      This note was completed in part using Dragon voice recognition software. Although reviewed after completion, some word and grammatical errors may occur.    Georgiana Farfan, APRN CNP, APRN, CNP           Data:   All laboratory data reviewed      Total time spent today was 34 mins, reviewing labs, testing, notes, documenting notes, and seeing patient.          Constitutional:  cooperative, alert and oriented, well developed, well nourished, in no acute distress  overweight     Skin:  warm and dry to the touch         Head:  normocephalic       Eyes:  pupils equal and round       ENT:  no pallor or cyanosis       Neck:  no stiffness       Respiratory:  clear to auscultation; normal symmetry        Cardiac: regular rate and rhythm; normal S1 and S2                 pulses full and equal     GI:  abdomen soft, nondistended     Extremities and Muscular Skeletal:  no edema       Neurological:  affect appropriate; no gross motor deficits       Psych:  Alert and Oriented x 3 , appropriate affact

## 2022-12-21 NOTE — LETTER
12/21/2022    Karol Yousif MD  [address]  [facility] [address]    RE: Francesca Rivera       Dear Colleague,     I had the pleasure of seeing Francesca Rivera in the [facility].  Cardiology Clinic Progress Note  Francesca Rivera MRN# [id number]   YOB: 1965 Age: 57 year old      Primary Cardiologist:   Dr. Shen          History of Presenting Illness:      Francesca Rivera is a pleasant 57 year old patient with a past cardiac history significant for   1. Nonobstructive CAD   2. Calcium score 111 2020  3. Lexiscan abnormal  4. CT coronary angiogram 12/2022 distal LAD is intramyocardial and small vessel  5. Hyperlipidemia  Past medical history significant for GERD, asthma, elevated BMI    She has a history of elevated coronary calcium score at 111 in 2020 and was placed on medical therapy at that time.    Patient was seen by Dr. Shen in November 2022 in consultation.  Since retiring she reported shortness of breath but had also gained weight.  She described random chest pressure lasting a few seconds and subsiding spontaneously.  Echocardiogram showed normal EF and no significant valvular disease.  Lexiscan nuclear stress test was nondiagnostic and included breast attenuation with a small area of ischemia anteriorly.  He recommended proceeding with CT coronary angiogram.    Pt presents today for testing follow-up. CT coronary angiogram 12/20/2022 with total calcium score 178, mild disease LAD, trivial RCA and LCx.  They noted that the distal LAD is intramyocardial and the remainder of the vessel was small in caliber and difficult to assess. Lipid profile October 2022 is elevated with .  ALT mildly elevated at 45.  Results reviewed today.     She continues with stable chest discomfort and dyspnea on exertion.  Again, the chest discomfort lasts for few seconds and resolves spontaneously.  Blood pressure today is controlled.  We discussed Mediterranean diet and  she is agreeable to adding zetia.  In the future, if ALT normalizes we can consider increasing statin. Patient reports no PND, orthopnea, presyncope, syncope, edema, heart racing, or palpitations.    Labs:  LIPID RESULTS:  Lab Results   Component Value Date    CHOL 208 (H) 10/06/2022    CHOL 287 (H) 09/02/2020    HDL 80 10/06/2022    HDL 83 09/02/2020     (H) 10/06/2022     (H) 09/02/2020    TRIG 97 10/06/2022    TRIG 86 09/02/2020    CHOLHDLRATIO 3.0 03/20/2012       LIVER ENZYME RESULTS:  Lab Results   Component Value Date    AST 50 (H) 10/06/2022    AST 30 11/08/2007    ALT 45 (H) 10/06/2022    ALT 16 11/08/2007       CBC RESULTS:  Lab Results   Component Value Date    WBC 6.3 10/06/2022    WBC 6.9 07/22/2020    RBC 4.45 10/06/2022    RBC 4.29 07/22/2020    HGB 12.9 10/06/2022    HGB 12.8 07/22/2020    HCT 40.1 10/06/2022    HCT 39.7 07/22/2020    MCV 90 10/06/2022    MCV 93 07/22/2020    MCH 29.0 10/06/2022    MCH 29.8 07/22/2020    MCHC 32.2 10/06/2022    MCHC 32.2 07/22/2020    RDW 14.4 10/06/2022    RDW 14.4 07/22/2020     10/06/2022     07/22/2020       BMP RESULTS:  Lab Results   Component Value Date     10/06/2022     07/22/2020    POTASSIUM 3.9 10/06/2022    POTASSIUM 4.0 07/22/2020    CHLORIDE 100 10/06/2022    CHLORIDE 109 07/22/2020    CO2 26 10/06/2022    CO2 27 07/22/2020    ANIONGAP 12 10/06/2022    ANIONGAP 4 07/22/2020    GLC 93 10/06/2022    GLC 93 10/08/2021     (H) 07/22/2020    BUN 8.0 10/06/2022    BUN 17 07/22/2020    CR 0.76 10/06/2022    CR 0.64 07/22/2020    GFRESTIMATED >90 10/06/2022    GFRESTIMATED >90 07/22/2020    GFRESTBLACK >90 07/22/2020    JACKELINE 9.1 10/06/2022    JACKELINE 8.8 07/22/2020        A1C RESULTS:  Lab Results   Component Value Date    A1C 5.4 10/06/2022    A1C 5.4 09/02/2020       INR RESULTS:  No results found for: INR    Results reviewed today.       Current Cardiac Medications     Atorvastatin 40 mg daily                      Assessment and Plan:       Plan    Patient Instructions   Medication Changes:  6. START zetia 10 mg daily   7. START metoprolol XL 25 mg daily, can take in evening if fatigued   8. START aspirin 81 mg daily    Recommendations:  1. Call if heart rate is less than 50 or less than 55 and lightheaded.  2. Call if blood pressure is less than 90 on top or less than 100 with lightheadedness.         Follow-up:  1. Fasting lab in 2 months (lipid)   2. See georgiana ELDRIDGE for cardiology follow up at [place]: 1 month.   Call 6 months prior, to schedule.     Cardiology Scheduling~[phone]  Cardiology Clinic RN~[phone] (Jamia RN, Jessica RN, Tessie RN)            1. CAD     Ramos, chest pain    Continue statin, start aspirin and beta-blocker    Consider adding CCB or Ranexa, if needed      2. Hyperlipidemia    Last  10/2022    Continue atorvastatin 40 mg daily and start Zetia    Consider increasing statin if ALT normalizes or monitoring closely               Thank you for allowing me to participate in this delightful patient's care.      This note was completed in part using Dragon voice recognition software. Although reviewed after completion, some word and grammatical errors may occur.    Georgiana Farfan, JAZIEL CNP, APRN, CNP           Data:   All laboratory data reviewed      Total time spent today was 34 mins, reviewing labs, testing, notes, documenting notes, and seeing patient.          Constitutional:  cooperative, alert and oriented, well developed, well nourished, in no acute distress  overweight     Skin:  warm and dry to the touch         Head:  normocephalic       Eyes:  pupils equal and round       ENT:  no pallor or cyanosis       Neck:  no stiffness       Respiratory:  clear to auscultation; normal symmetry        Cardiac: regular rate and rhythm; normal S1 and S2                 pulses full and equal     GI:  abdomen soft, nondistended     Extremities and Muscular Skeletal:  no edema        Neurological:  affect appropriate; no gross motor deficits       Psych:  Alert and Oriented x 3 , appropriate affact    Thank you for allowing me to participate in the care of your patient.      Sincerely,     JAZIEL [facility]  cc:   Marjorie Shen MD  [address]  [place] [address]

## 2022-12-21 NOTE — RESULT ENCOUNTER NOTE
Stable R lung nodule. No other acute non-cardiac finding. Follow up with Georgiana Lewis NP on 12/21/22.

## 2022-12-21 NOTE — PATIENT INSTRUCTIONS
Medication Changes:  START zetia 10 mg daily   START metoprolol XL 25 mg daily, can take in evening if fatigued   START aspirin 81 mg daily    Recommendations:  Call if heart rate is less than 50 or less than 55 and lightheaded.  Call if blood pressure is less than 90 on top or less than 100 with lightheadedness.         Follow-up:  Fasting lab in 2 months (lipid)   See genesis ELDRIDGE for cardiology follow up at [place]: 1 month.   Call 6 months prior, to schedule.     Cardiology Scheduling~[phone]  Cardiology Clinic RN~[phone] (Jamia RN, Jessica RN, Tessie RN)

## 2023-01-25 ENCOUNTER — OFFICE VISIT (OUTPATIENT)
Dept: CARDIOLOGY | Facility: CLINIC | Age: 58
End: 2023-01-25
Attending: NURSE PRACTITIONER
Payer: COMMERCIAL

## 2023-01-25 VITALS
SYSTOLIC BLOOD PRESSURE: 123 MMHG | OXYGEN SATURATION: 98 % | BODY MASS INDEX: 31.45 KG/M2 | DIASTOLIC BLOOD PRESSURE: 88 MMHG | RESPIRATION RATE: 12 BRPM | HEART RATE: 64 BPM | WEIGHT: 183.2 LBS

## 2023-01-25 DIAGNOSIS — E78.5 HYPERLIPIDEMIA LDL GOAL <70: ICD-10-CM

## 2023-01-25 DIAGNOSIS — I25.10 NONOBSTRUCTIVE ATHEROSCLEROSIS OF CORONARY ARTERY: Primary | ICD-10-CM

## 2023-01-25 DIAGNOSIS — R94.39 ABNORMAL STRESS ECG: ICD-10-CM

## 2023-01-25 PROCEDURE — 99214 OFFICE O/P EST MOD 30 MIN: CPT | Performed by: NURSE PRACTITIONER

## 2023-01-25 NOTE — PATIENT INSTRUCTIONS
Medication Changes:  None     Recommendations:  Call with questions    Follow-up:  Fasting lab in feb 2023 (lipid/ALT), as scheduled  See Dr. Shen for cardiology follow up at [facility]: July 2023. Call in Feb to schedule.   Call 6 months prior, to schedule.     Cardiology Scheduling~[phone]  Cardiology Clinic RN~[phone] (Jamia RN, Jessica RN, Tessie RN)       Stable

## 2023-01-25 NOTE — PROGRESS NOTES
Cardiology Clinic Progress Note  Francesca Rivera MRN# [id number]   YOB: 1965 Age: 57 year old      Primary Cardiologist:   Dr. Shen          History of Presenting Illness:      Francesca Rivera is a pleasant 57 year old patient with a past cardiac history significant for   1. Nonobstructive CAD   2. Calcium score 111 2020  3. Lexiscan abnormal  4. CT coronary angiogram 12/2022 total calcium score 178 mild pLAD, trivial RCA and LCx. Distal LAD is intramyocardial and small vessel  5. Hyperlipidemia  Past medical history significant for GERD, asthma, elevated BMI    She has a history of elevated coronary calcium score at 111 in 2020 and was placed on medical therapy at that time.    Patient was seen by Dr. Shen in November 2022 in consultation.  Since retiring she reported shortness of breath but had also gained weight.  She described random chest pressure lasting a few seconds and subsiding spontaneously.  Echocardiogram showed normal EF and no significant valvular disease.  Lexiscan nuclear stress test was nondiagnostic and included breast attenuation with a small area of ischemia anteriorly.  He recommended proceeding with CT coronary angiogram.    Patient was seen by me in December 2022.  CT coronary angiogram showed mild nonobstructive disease with intramyocardial distal LAD and small vessel.  LDL was elevated and she was started on Zetia in addition to her statin, due to mildly elevated ALT.  She continued with stable chest discomfort, lasting for a few seconds and resolving spontaneously, and dyspnea on exertion.    Pt presents today for 1 month follow-up.  She denies any significant side effects with medication changes.  She did note some itching after the changes but also had a trip to [place].  She denies any rash and states that the itching has been improving.  She will let us know if this continues to be a problem.  Blood pressure and heart rates are well controlled.  She has been  taking baby aspirin which if forgot to add to her list last visit.  She reports that she only had 1 episode of chest pressure over the last month, has had more stamina, and has less shortness of breath with exertion. Patient reports no PND, orthopnea, presyncope, syncope, edema, heart racing, or palpitations.    She is wondering if she can follow-up with cardiology as needed.  I recommended getting Dr. Shen's opinion at her 6-month follow-up with him.  I am hesitant to offer as needed follow-up, given her pLAD disease.         Labs:  LIPID RESULTS:  Lab Results   Component Value Date    CHOL 208 (H) 10/06/2022    CHOL 287 (H) 09/02/2020    HDL 80 10/06/2022    HDL 83 09/02/2020     (H) 10/06/2022     (H) 09/02/2020    TRIG 97 10/06/2022    TRIG 86 09/02/2020    CHOLHDLRATIO 3.0 03/20/2012       LIVER ENZYME RESULTS:  Lab Results   Component Value Date    AST 50 (H) 10/06/2022    AST 30 11/08/2007    ALT 45 (H) 10/06/2022    ALT 16 11/08/2007       CBC RESULTS:  Lab Results   Component Value Date    WBC 6.3 10/06/2022    WBC 6.9 07/22/2020    RBC 4.45 10/06/2022    RBC 4.29 07/22/2020    HGB 12.9 10/06/2022    HGB 12.8 07/22/2020    HCT 40.1 10/06/2022    HCT 39.7 07/22/2020    MCV 90 10/06/2022    MCV 93 07/22/2020    MCH 29.0 10/06/2022    MCH 29.8 07/22/2020    MCHC 32.2 10/06/2022    MCHC 32.2 07/22/2020    RDW 14.4 10/06/2022    RDW 14.4 07/22/2020     10/06/2022     07/22/2020       BMP RESULTS:  Lab Results   Component Value Date     10/06/2022     07/22/2020    POTASSIUM 3.9 10/06/2022    POTASSIUM 4.0 07/22/2020    CHLORIDE 100 10/06/2022    CHLORIDE 109 07/22/2020    CO2 26 10/06/2022    CO2 27 07/22/2020    ANIONGAP 12 10/06/2022    ANIONGAP 4 07/22/2020    GLC 93 10/06/2022    GLC 93 10/08/2021     (H) 07/22/2020    BUN 8.0 10/06/2022    BUN 17 07/22/2020    CR 0.76 10/06/2022    CR 0.64 07/22/2020    GFRESTIMATED >90 10/06/2022    GFRESTIMATED >90  07/22/2020    GFRESTBLACK >90 07/22/2020    JACKELINE 9.1 10/06/2022    JACKELINE 8.8 07/22/2020        A1C RESULTS:  Lab Results   Component Value Date    A1C 5.4 10/06/2022    A1C 5.4 09/02/2020       INR RESULTS:  No results found for: INR    Results reviewed today.       Current Cardiac Medications   Aspirin 81 mg daily   Atorvastatin 40 mg daily  Zetia 10 mg daily  Metoprolol XL 25 mg daily                   Assessment and Plan:       Plan  Patient Instructions   Medication Changes:  6. None     Recommendations:  1. Call with questions    Follow-up:  1. Fasting lab in feb 2023 (lipid/ALT), as scheduled  2. See Dr. Shen for cardiology follow up at [facility]: July 2023. Call in Feb to schedule.   Call 6 months prior, to schedule.     Cardiology Scheduling~[phone]  Cardiology Clinic RN~[phone] (Jamia RN, Jessica RN, Tessie RN)          1. CAD     Ramos and chest pain improved    Continue statin, aspirin and beta-blocker    Consider adding CCB or Ranexa, if needed      2. Hyperlipidemia    Last  10/2022    Continue atorvastatin 40 mg daily and started Zetia    Reassess lipids February 2023    Consider increasing statin if ALT normalizes or monitoring closely               Thank you for allowing me to participate in this delightful patient's care.      This note was completed in part using Dragon voice recognition software. Although reviewed after completion, some word and grammatical errors may occur.    Georgiana Farfan, APRN CNP, APRN, CNP           Data:   All laboratory data reviewed           Constitutional:  cooperative, alert and oriented, well developed, well nourished, in no acute distress  overweight     Skin:  warm and dry to the touch         Head:  normocephalic       Eyes:  pupils equal and round       ENT:  no pallor or cyanosis       Neck:  no stiffness       Respiratory:  clear to auscultation; normal symmetry        Cardiac: regular rate and rhythm; normal S1 and S2                  pulses full and equal     GI:  abdomen soft, nondistended     Extremities and Muscular Skeletal:  no edema       Neurological:  affect appropriate; no gross motor deficits       Psych:  Alert and Oriented x 3 , appropriate affact

## 2023-01-25 NOTE — LETTER
1/25/2023    Karol Yousif MD  [address]  [facility] [address]    RE: Francesca Rivera       Dear Colleague,     I had the pleasure of seeing Francesca Rivera in the [facility].  Cardiology Clinic Progress Note  Francesca Rivera MRN# [id number]   YOB: 1965 Age: 57 year old      Primary Cardiologist:   Dr. Shen          History of Presenting Illness:      Francesca Rivera is a pleasant 57 year old patient with a past cardiac history significant for   1. Nonobstructive CAD   2. Calcium score 111 2020  3. Lexiscan abnormal  4. CT coronary angiogram 12/2022 total calcium score 178 mild pLAD, trivial RCA and LCx. Distal LAD is intramyocardial and small vessel  5. Hyperlipidemia  Past medical history significant for GERD, asthma, elevated BMI    She has a history of elevated coronary calcium score at 111 in 2020 and was placed on medical therapy at that time.    Patient was seen by Dr. Shen in November 2022 in consultation.  Since retiring she reported shortness of breath but had also gained weight.  She described random chest pressure lasting a few seconds and subsiding spontaneously.  Echocardiogram showed normal EF and no significant valvular disease.  Lexiscan nuclear stress test was nondiagnostic and included breast attenuation with a small area of ischemia anteriorly.  He recommended proceeding with CT coronary angiogram.    Patient was seen by me in December 2022.  CT coronary angiogram showed mild nonobstructive disease with intramyocardial distal LAD and small vessel.  LDL was elevated and she was started on Zetia in addition to her statin, due to mildly elevated ALT.  She continued with stable chest discomfort, lasting for a few seconds and resolving spontaneously, and dyspnea on exertion.    Pt presents today for 1 month follow-up.  She denies any significant side effects with medication changes.  She did note some itching after the changes but also had a  trip to [place].  She denies any rash and states that the itching has been improving.  She will let us know if this continues to be a problem.  Blood pressure and heart rates are well controlled.  She has been taking baby aspirin which if forgot to add to her list last visit.  She reports that she only had 1 episode of chest pressure over the last month, has had more stamina, and has less shortness of breath with exertion. Patient reports no PND, orthopnea, presyncope, syncope, edema, heart racing, or palpitations.    She is wondering if she can follow-up with cardiology as needed.  I recommended getting Dr. Shen's opinion at her 6-month follow-up with him.  I am hesitant to offer as needed follow-up, given her pLAD disease.         Labs:  LIPID RESULTS:  Lab Results   Component Value Date    CHOL 208 (H) 10/06/2022    CHOL 287 (H) 09/02/2020    HDL 80 10/06/2022    HDL 83 09/02/2020     (H) 10/06/2022     (H) 09/02/2020    TRIG 97 10/06/2022    TRIG 86 09/02/2020    CHOLHDLRATIO 3.0 03/20/2012       LIVER ENZYME RESULTS:  Lab Results   Component Value Date    AST 50 (H) 10/06/2022    AST 30 11/08/2007    ALT 45 (H) 10/06/2022    ALT 16 11/08/2007       CBC RESULTS:  Lab Results   Component Value Date    WBC 6.3 10/06/2022    WBC 6.9 07/22/2020    RBC 4.45 10/06/2022    RBC 4.29 07/22/2020    HGB 12.9 10/06/2022    HGB 12.8 07/22/2020    HCT 40.1 10/06/2022    HCT 39.7 07/22/2020    MCV 90 10/06/2022    MCV 93 07/22/2020    MCH 29.0 10/06/2022    MCH 29.8 07/22/2020    MCHC 32.2 10/06/2022    MCHC 32.2 07/22/2020    RDW 14.4 10/06/2022    RDW 14.4 07/22/2020     10/06/2022     07/22/2020       BMP RESULTS:  Lab Results   Component Value Date     10/06/2022     07/22/2020    POTASSIUM 3.9 10/06/2022    POTASSIUM 4.0 07/22/2020    CHLORIDE 100 10/06/2022    CHLORIDE 109 07/22/2020    CO2 26 10/06/2022    CO2 27 07/22/2020    ANIONGAP 12 10/06/2022    ANIONGAP 4 07/22/2020     GLC 93 10/06/2022    GLC 93 10/08/2021     (H) 07/22/2020    BUN 8.0 10/06/2022    BUN 17 07/22/2020    CR 0.76 10/06/2022    CR 0.64 07/22/2020    GFRESTIMATED >90 10/06/2022    GFRESTIMATED >90 07/22/2020    GFRESTBLACK >90 07/22/2020    JACKELINE 9.1 10/06/2022    JACKELINE 8.8 07/22/2020        A1C RESULTS:  Lab Results   Component Value Date    A1C 5.4 10/06/2022    A1C 5.4 09/02/2020       INR RESULTS:  No results found for: INR    Results reviewed today.       Current Cardiac Medications   Aspirin 81 mg daily   Atorvastatin 40 mg daily  Zetia 10 mg daily  Metoprolol XL 25 mg daily                   Assessment and Plan:       Plan  Patient Instructions   Medication Changes:  6. None     Recommendations:  1. Call with questions    Follow-up:  1. Fasting lab in feb 2023 (lipid/ALT), as scheduled  2. See Dr. Shen for cardiology follow up at [facility] [place] [facility]: July 2023. Call in Feb to schedule.   Call 6 months prior, to schedule.     Cardiology Scheduling~[phone]  Cardiology Clinic RN~[phone] (Jamia RN, Jessica RN, Tessie RN)          1. CAD     Ramos and chest pain improved    Continue statin, aspirin and beta-blocker    Consider adding CCB or Ranexa, if needed      2. Hyperlipidemia    Last  10/2022    Continue atorvastatin 40 mg daily and started Zetia    Reassess lipids February 2023    Consider increasing statin if ALT normalizes or monitoring closely               Thank you for allowing me to participate in this delightful patient's care.      This note was completed in part using Dragon voice recognition software. Although reviewed after completion, some word and grammatical errors may occur.    Georgiana Farfan, APRN CNP, APRN, CNP           Data:   All laboratory data reviewed           Constitutional:  cooperative, alert and oriented, well developed, well nourished, in no acute distress  overweight     Skin:  warm and dry to the touch         Head:  normocephalic       Eyes:  pupils  equal and round       ENT:  no pallor or cyanosis       Neck:  no stiffness       Respiratory:  clear to auscultation; normal symmetry        Cardiac: regular rate and rhythm; normal S1 and S2                 pulses full and equal     GI:  abdomen soft, nondistended     Extremities and Muscular Skeletal:  no edema       Neurological:  affect appropriate; no gross motor deficits       Psych:  Alert and Oriented x 3 , appropriate affact    Thank you for allowing me to participate in the care of your patient.      Sincerely,     JAZIEL Hayward CNP     [facility]  cc:   JAZIEL Martinez CNP  [address]  [place] [address]

## 2023-02-28 ENCOUNTER — LAB (OUTPATIENT)
Dept: LAB | Facility: CLINIC | Age: 58
End: 2023-02-28
Attending: NURSE PRACTITIONER
Payer: COMMERCIAL

## 2023-02-28 DIAGNOSIS — E78.5 HYPERLIPIDEMIA LDL GOAL <70: ICD-10-CM

## 2023-02-28 LAB
CHOLEST SERPL-MCNC: 154 MG/DL
HDLC SERPL-MCNC: 77 MG/DL
LDLC SERPL CALC-MCNC: 62 MG/DL
NONHDLC SERPL-MCNC: 77 MG/DL
TRIGL SERPL-MCNC: 77 MG/DL

## 2023-02-28 PROCEDURE — 36415 COLL VENOUS BLD VENIPUNCTURE: CPT

## 2023-02-28 PROCEDURE — 80061 LIPID PANEL: CPT

## 2023-03-05 ASSESSMENT — ASTHMA QUESTIONNAIRES
QUESTION_2 LAST FOUR WEEKS HOW OFTEN HAVE YOU HAD SHORTNESS OF BREATH: ONCE OR TWICE A WEEK
QUESTION_3 LAST FOUR WEEKS HOW OFTEN DID YOUR ASTHMA SYMPTOMS (WHEEZING, COUGHING, SHORTNESS OF BREATH, CHEST TIGHTNESS OR PAIN) WAKE YOU UP AT NIGHT OR EARLIER THAN USUAL IN THE MORNING: NOT AT ALL
QUESTION_4 LAST FOUR WEEKS HOW OFTEN HAVE YOU USED YOUR RESCUE INHALER OR NEBULIZER MEDICATION (SUCH AS ALBUTEROL): NOT AT ALL
QUESTION_5 LAST FOUR WEEKS HOW WOULD YOU RATE YOUR ASTHMA CONTROL: WELL CONTROLLED
ACT_TOTALSCORE: 23
QUESTION_1 LAST FOUR WEEKS HOW MUCH OF THE TIME DID YOUR ASTHMA KEEP YOU FROM GETTING AS MUCH DONE AT WORK, SCHOOL OR AT HOME: NONE OF THE TIME
ACT_TOTALSCORE: 23

## 2023-03-06 ENCOUNTER — OFFICE VISIT (OUTPATIENT)
Dept: FAMILY MEDICINE | Facility: CLINIC | Age: 58
End: 2023-03-06
Payer: COMMERCIAL

## 2023-03-06 VITALS
HEIGHT: 64 IN | OXYGEN SATURATION: 98 % | HEART RATE: 69 BPM | SYSTOLIC BLOOD PRESSURE: 112 MMHG | WEIGHT: 185 LBS | DIASTOLIC BLOOD PRESSURE: 68 MMHG | BODY MASS INDEX: 31.58 KG/M2 | TEMPERATURE: 98.5 F | RESPIRATION RATE: 16 BRPM

## 2023-03-06 DIAGNOSIS — M54.50 ACUTE MIDLINE LOW BACK PAIN WITHOUT SCIATICA: Primary | ICD-10-CM

## 2023-03-06 DIAGNOSIS — I25.118 ATHEROSCLEROTIC HEART DISEASE OF NATIVE CORONARY ARTERY WITH OTHER FORMS OF ANGINA PECTORIS (H): ICD-10-CM

## 2023-03-06 PROCEDURE — 99214 OFFICE O/P EST MOD 30 MIN: CPT | Performed by: NURSE PRACTITIONER

## 2023-03-06 RX ORDER — NAPROXEN 500 MG/1
500 TABLET ORAL 2 TIMES DAILY WITH MEALS
Qty: 10 TABLET | Refills: 0 | Status: SHIPPED | OUTPATIENT
Start: 2023-03-06 | End: 2023-03-11

## 2023-03-06 ASSESSMENT — PAIN SCALES - GENERAL: PAINLEVEL: EXTREME PAIN (8)

## 2023-03-06 ASSESSMENT — ENCOUNTER SYMPTOMS: BACK PAIN: 1

## 2023-03-06 NOTE — PATIENT INSTRUCTIONS
Stop the ibuprofen and try the Naproxen as prescribed. Can continue with tylenol.  Continue with heat or ice.   Try to move and stretch as able.  Take the muscle relaxant, use caution as it will make you tired.  If no improvement or any worsening, PT and x ray.      Our Clinic hours are:  Mondays    7:20 am - 7 pm  Tues -  Fri  7:20 am - 5 pm    Clinic Phone: [phone]    The clinic lab opens at 7:30 am Mon - Fri and appointments are required.    [facility] [place]  Ph. [phone]  Monday  8 am - 7pm  Tues - Fri 8 am - 5:30 pm

## 2023-03-06 NOTE — PROGRESS NOTES
Assessment & Plan     Acute midline low back pain without sciatica    - tiZANidine (ZANAFLEX) 4 MG tablet; Take 1 tablet (4 mg) by mouth 3 times daily as needed for muscle spasms  - naproxen (NAPROSYN) 500 MG tablet; Take 1 tablet (500 mg) by mouth 2 times daily (with meals) for 5 days  Discussed how to take the medication(s), expected outcomes, potential side effects.  This seems muscular and would anticipate recovering with medications. Discussed PT and she declined at this time.    Atherosclerotic heart disease of native coronary artery with other forms of angina pectoris (H)    Stable at present, sees cardiology on regular visit, last on 1/26/23 with no changes.  Continue monitoring, medications and cardiology visits.                 See Patient Instructions  Patient Instructions   Stop the ibuprofen and try the Naproxen as prescribed. Can continue with tylenol.  Continue with heat or ice.   Try to move and stretch as able.  Take the muscle relaxant, use caution as it will make you tired.  If no improvement or any worsening, PT and x ray.      Our Clinic hours are:  Mondays    7:20 am - 7 pm  Tues -  Fri  7:20 am - 5 pm    Clinic Phone: [phone]    The clinic lab opens at 7:30 am Mon - Fri and appointments are required.    [facility] [place]  Ph. [phone]  Monday  8 am - 7pm  Tues - Fri 8 am - 5:30 pm           Return in about 1 week (around 3/13/2023) for or sooner if symptoms persist or worsen.    JAZIEL Weaver CNP  [facility]    Bianca Lopez is a 57 year old, presenting for the following health issues:  Back Pain (Patient was dancing on 3/4/23 )      Back Pain     History of Present Illness       Back Pain:  She presents for follow up of back pain. Patient's back pain is a new problem.    Original cause of back pain: other  First noticed back pain: yesterday  Patient feels back pain: constantlyLocation of back pain:  Right lower back, left  "lower back, right middle of back and left middle of back  Description of back pain: sharp  Back pain spreads: nowhere    Since patient first noticed back pain, pain is: unchanged  Does back pain interfere with her job:  Not applicable  On a scale of 1-10 (10 being the worst), patient describes pain as:  7  What makes back pain worse: bending, standing and twisting  Acupuncture: not tried  Acetaminophen: not tried  Activity or exercise: not tried  Chiropractor:  Not tried  Cold: not tried  Heat: not tried  Massage: not tried  Muscle relaxants: not tried  NSAIDS: helpful  Opioids: not tried  Physical Therapy: not tried  Rest: helpful  Steroid Injection: not tried  Stretching: not tried  Surgery: not tried  TENS unit: not tried  Topical pain relievers: not tried  Other healthcare providers patient is seeing for back pain: None    She eats 2-3 servings of fruits and vegetables daily.She consumes 0 sweetened beverage(s) daily.She exercises with enough effort to increase her heart rate 30 to 60 minutes per day.  She exercises with enough effort to increase her heart rate 4 days per week.   She is taking medications regularly.     She was dancing at a party 3 days ago and felt a \"pop\" in her mid to lower back. She drove home after that. She states it's been stiff, worse with any movements since.  Has had on and off back issues in the past but nothing severe.  Has been trying ibuprofen and tylenol and ice and heat.  No radiation of pain or changes in bowels/bladder.          Review of Systems   Musculoskeletal: Positive for back pain.            Objective    /68   Pulse 69   Temp 98.5  F (36.9  C)   Resp 16   Ht 1.626 m (5' 4\")   Wt 83.9 kg (185 lb)   LMP 08/15/2006   SpO2 98%   BMI 31.76 kg/m    Body mass index is 31.76 kg/m .  Physical Exam   GENERAL: healthy, alert and no distress  NECK: no adenopathy, no asymmetry  RESP: lungs clear to auscultation - no rales, rhonchi or wheezes  CV: regular rate and " rhythm, normal S1 S2, no S3 or S4, no murmur  MS: no gross musculoskeletal defects noted, moving very slowly, decreased spinal flexion, no pain with palpation over spine or muscles, 2+ DTR of patella bilaterally, negative SLR

## 2023-03-10 ENCOUNTER — MYC MEDICAL ADVICE (OUTPATIENT)
Dept: FAMILY MEDICINE | Facility: CLINIC | Age: 58
End: 2023-03-10
Payer: COMMERCIAL

## 2023-03-10 DIAGNOSIS — M54.50 ACUTE MIDLINE LOW BACK PAIN WITHOUT SCIATICA: Primary | ICD-10-CM

## 2023-03-10 NOTE — TELEPHONE ENCOUNTER
See my chart message    Requesting orders for x-ray and PT for back pain    Ada Cordova RN on 3/10/2023 at 1:02 PM

## 2023-03-13 ENCOUNTER — ANCILLARY PROCEDURE (OUTPATIENT)
Dept: GENERAL RADIOLOGY | Facility: CLINIC | Age: 58
End: 2023-03-13
Attending: NURSE PRACTITIONER
Payer: COMMERCIAL

## 2023-03-13 DIAGNOSIS — M54.50 ACUTE MIDLINE LOW BACK PAIN WITHOUT SCIATICA: ICD-10-CM

## 2023-03-13 PROCEDURE — 72100 X-RAY EXAM L-S SPINE 2/3 VWS: CPT | Mod: TC | Performed by: RADIOLOGY

## 2023-03-14 ENCOUNTER — HOSPITAL ENCOUNTER (OUTPATIENT)
Dept: PHYSICAL THERAPY | Facility: CLINIC | Age: 58
Setting detail: THERAPIES SERIES
Discharge: HOME | End: 2023-03-14
Attending: NURSE PRACTITIONER
Payer: COMMERCIAL

## 2023-03-14 DIAGNOSIS — M54.50 ACUTE MIDLINE LOW BACK PAIN WITHOUT SCIATICA: ICD-10-CM

## 2023-03-14 PROCEDURE — 97110 THERAPEUTIC EXERCISES: CPT | Mod: GP | Performed by: PHYSICAL THERAPIST

## 2023-03-14 PROCEDURE — 97140 MANUAL THERAPY 1/> REGIONS: CPT | Mod: GP | Performed by: PHYSICAL THERAPIST

## 2023-03-14 PROCEDURE — 97161 PT EVAL LOW COMPLEX 20 MIN: CPT | Mod: GP | Performed by: PHYSICAL THERAPIST

## 2023-03-15 NOTE — PROGRESS NOTES
"   03/14/23 0700   General Information   Type of Visit Initial OP Ortho PT Evaluation   Start of Care Date 03/14/23   Referring Physician Lynn Tyler APRN CNP   Patient/Family Goals Statement decrease pain   Orders Evaluate and Treat   Date of Order 03/11/23   Certification Required? No   Medical Diagnosis Acute midline low back pain without sciatica (M54.50)   Body Part(s)   Body Part(s) Lumbar Spine/SI   Presentation and Etiology   Pertinent history of current problem (include personal factors and/or comorbidities that impact the POC) Pt has a sore point on mid to lower back. The pt reports she is off medications with less pain to the area since the initial injury. No radiating in the back. She noted this occurring on 3/4/23 when she was dancing and felt a \"pop\". Pt noted having a massage yesterday to reduce the tensio mariana her upper back. The pt notes frequently traveling ~1x/month.   Impairments A. Pain;D. Decreased ROM;E. Decreased flexibility;F. Decreased strength and endurance   Functional Limitations perform activities of daily living;perform desired leisure / sports activities   Symptom Location low back   How/Where did it occur From insidious onset   Onset date of current episode/exacerbation 03/04/23   Chronicity New   Pain rating (0-10 point scale) Best (/10);Worst (/10)   Best (/10) 3   Worst (/10) 6   Pain quality A. Sharp;B. Dull;C. Aching   Frequency of pain/symptoms A. Constant   Pain/symptoms are: Worse during the day   Pain/symptoms exacerbated by B. Walking;C. Lifting;D. Carrying;I. Bending   Pain/symptoms eased by A. Sitting;H. Cold;I. OTC medication(s)   Progression of symptoms since onset: Unchanged   Prior Level of Function   Functional Level Prior Comment garden, traveling more   Current Level of Function   Patient role/employment history F. Retired   Fall Risk Screen   Fall screen completed by PT   Have you fallen 2 or more times in the past year? No   Have you fallen and had an " injury in the past year? No   Is patient a fall risk? No   Abuse Screen (yes response referral indicated)   Feels Unsafe at Home or Work/School no   Feels Threatened by Someone no   Does Anyone Try to Keep You From Having Contact with Others or Doing Things Outside Your Home? no   Physical Signs of Abuse Present no   Lumbar Spine/SI Objective Findings   Gait/Locomotion slow gait with small steps   Hamstring Flexibility good   Hip Flexor Flexibility tight   Piriformis Flexibility tight   Flexion ROM able to reach mid shin with pain   Extension ROM 25% of ROM with pain   Right Side Bending ROM pulling on opposite side with reaching knee   Left Side Bending ROM pulling on opposite side with reaching knee   Pelvic Screen - upslip;  - sacral rotation   Transversus Abdominus Strength (David Leg Lowering-deg) 2/5   Hip Flexion (L2) Strength 4/5   Hip Abduction Strength 3+/5   Hip Extension Strength 3/5   SLR -   Alexy Test +   Prone Instability Test -   Crossover SLR -   Spring Test +   Segmental Mobility rotation to the R at T11-3 with extension   Palpation tender to ES and QL b/l   Slump Test +, stretching to the low back and pulling with aggravated Sx   Planned Therapy Interventions   Planned Therapy Interventions balance training;gait training;joint mobilization;manual therapy;neuromuscular re-education;ROM;strengthening;stretching   Planned Modality Interventions   Planned Modality Interventions Electrical stimulation;TENS;Traction   Clinical Impression   Criteria for Skilled Therapeutic Interventions Met yes, treatment indicated   PT Diagnosis mid/LBP   Influenced by the following impairments weakness, muscle imbalance, limitation in mobility at segments   Functional limitations due to impairments ambulation, lifting/carrying, STS, stairs   Clinical Presentation Stable/Uncomplicated   Clinical Presentation Rationale sx are stable due to no radiating Sx   Clinical Decision Making (Complexity) Low complexity    Therapy Frequency 1 time/week   Predicted Duration of Therapy Intervention (days/wks) 10 weeks   Risk & Benefits of therapy have been explained Yes   Patient, Family & other staff in agreement with plan of care Yes   Clinical Impression Comments Prognosis is good. The pt would benefit from skilled PT intervention to address the above limitations.   ORTHO GOALS   PT Ortho Eval Goals 1;2;3;4   Ortho Goal 1   Goal Identifier ST goal   Goal Description Pt will have 100% of lumbar ROM to be able to ambulate without pain in 4 weeks.   Target Date 04/12/23   Ortho Goal 2   Goal Identifier LT goal   Goal Description Pt will have 5/5 hip strength and 3/5 core strength to stabilize the area when lifting and carrying in 10 weeks.   Target Date 05/24/23   Ortho Goal 3   Goal Identifier LT goal   Goal Description Pt will have a - Alexy test to allow full ROM mobility in 10 weeks to be able to ambulate without pain.   Target Date 05/24/23   Ortho Goal 4   Goal Identifier LT goal   Goal Description Pt will be independent in home strengthening program for self management of Sx in 10 weeks.   Target Date 05/24/23   Total Evaluation Time   PT Eval, Low Complexity Minutes (33126) 25       Please contact me with concerns or questions,  Thank you for this referral,    Sakina Hill, PT, DPT    [place] [id number] [facility]   [address] [place] [address]  [email or web address]   Office: [phone]

## 2023-03-28 ENCOUNTER — HOSPITAL ENCOUNTER (OUTPATIENT)
Dept: PHYSICAL THERAPY | Facility: CLINIC | Age: 58
Setting detail: THERAPIES SERIES
Discharge: HOME | End: 2023-03-28
Attending: NURSE PRACTITIONER
Payer: COMMERCIAL

## 2023-03-28 PROCEDURE — 97110 THERAPEUTIC EXERCISES: CPT | Mod: GP | Performed by: PHYSICAL THERAPIST

## 2023-03-28 PROCEDURE — 97140 MANUAL THERAPY 1/> REGIONS: CPT | Mod: GP | Performed by: PHYSICAL THERAPIST

## 2023-04-02 ENCOUNTER — MYC MEDICAL ADVICE (OUTPATIENT)
Dept: FAMILY MEDICINE | Facility: CLINIC | Age: 58
End: 2023-04-02
Payer: COMMERCIAL

## 2023-04-02 DIAGNOSIS — R74.01 ELEVATED TRANSAMINASE LEVEL: Primary | ICD-10-CM

## 2023-04-03 ENCOUNTER — HOSPITAL ENCOUNTER (OUTPATIENT)
Dept: PHYSICAL THERAPY | Facility: CLINIC | Age: 58
Setting detail: THERAPIES SERIES
Discharge: HOME | End: 2023-04-03
Attending: NURSE PRACTITIONER
Payer: COMMERCIAL

## 2023-04-03 PROCEDURE — 97140 MANUAL THERAPY 1/> REGIONS: CPT | Mod: GP | Performed by: PHYSICAL THERAPIST

## 2023-04-03 PROCEDURE — 97110 THERAPEUTIC EXERCISES: CPT | Mod: GP | Performed by: PHYSICAL THERAPIST

## 2023-04-07 ENCOUNTER — LAB (OUTPATIENT)
Dept: LAB | Facility: CLINIC | Age: 58
End: 2023-04-07
Payer: COMMERCIAL

## 2023-04-07 DIAGNOSIS — Z11.59 NEED FOR HEPATITIS C SCREENING TEST: Primary | ICD-10-CM

## 2023-04-07 DIAGNOSIS — R74.01 ELEVATED TRANSAMINASE LEVEL: ICD-10-CM

## 2023-04-07 LAB
ALBUMIN SERPL BCG-MCNC: 4.4 G/DL (ref 3.5–5.2)
ALP SERPL-CCNC: 72 U/L (ref 35–104)
ALT SERPL W P-5'-P-CCNC: 25 U/L (ref 10–35)
AST SERPL W P-5'-P-CCNC: 28 U/L (ref 10–35)
BILIRUB DIRECT SERPL-MCNC: <0.2 MG/DL (ref 0–0.3)
BILIRUB SERPL-MCNC: 0.4 MG/DL
PROT SERPL-MCNC: 7.7 G/DL (ref 6.4–8.3)

## 2023-04-07 PROCEDURE — 80076 HEPATIC FUNCTION PANEL: CPT

## 2023-04-07 PROCEDURE — 86803 HEPATITIS C AB TEST: CPT

## 2023-04-07 PROCEDURE — 36415 COLL VENOUS BLD VENIPUNCTURE: CPT

## 2023-04-07 PROCEDURE — 87522 HEPATITIS C REVRS TRNSCRPJ: CPT

## 2023-04-08 LAB — HCV AB SERPL QL IA: REACTIVE

## 2023-04-10 ENCOUNTER — MYC MEDICAL ADVICE (OUTPATIENT)
Dept: FAMILY MEDICINE | Facility: CLINIC | Age: 58
End: 2023-04-10
Payer: COMMERCIAL

## 2023-04-10 ENCOUNTER — HOSPITAL ENCOUNTER (OUTPATIENT)
Dept: PHYSICAL THERAPY | Facility: CLINIC | Age: 58
Setting detail: THERAPIES SERIES
Discharge: HOME | End: 2023-04-10
Attending: NURSE PRACTITIONER
Payer: COMMERCIAL

## 2023-04-10 PROCEDURE — 97110 THERAPEUTIC EXERCISES: CPT | Mod: GP | Performed by: PHYSICAL THERAPIST

## 2023-04-10 NOTE — TELEPHONE ENCOUNTER
RN should be able to answer this.  The test is in process - may take a few days to come back.    Karol Yousif M.D.

## 2023-04-11 LAB — HCV RNA SERPL NAA+PROBE-ACNC: NOT DETECTED IU/ML

## 2023-04-17 ENCOUNTER — HOSPITAL ENCOUNTER (OUTPATIENT)
Dept: PHYSICAL THERAPY | Facility: CLINIC | Age: 58
Setting detail: THERAPIES SERIES
Discharge: HOME | End: 2023-04-17
Attending: NURSE PRACTITIONER
Payer: COMMERCIAL

## 2023-04-17 PROCEDURE — 97140 MANUAL THERAPY 1/> REGIONS: CPT | Mod: GP | Performed by: PHYSICAL THERAPIST

## 2023-04-17 PROCEDURE — 97110 THERAPEUTIC EXERCISES: CPT | Mod: GP | Performed by: PHYSICAL THERAPIST

## 2023-04-24 ENCOUNTER — HOSPITAL ENCOUNTER (OUTPATIENT)
Dept: PHYSICAL THERAPY | Facility: CLINIC | Age: 58
Setting detail: THERAPIES SERIES
Discharge: HOME | End: 2023-04-24
Attending: NURSE PRACTITIONER
Payer: COMMERCIAL

## 2023-04-24 PROCEDURE — 97140 MANUAL THERAPY 1/> REGIONS: CPT | Mod: GP | Performed by: PHYSICAL THERAPIST

## 2023-04-24 NOTE — PROGRESS NOTES
[facility]    Outpatient Physical Therapy Discharge Note  Patient: Francesca Rivera  : 1965    Beginning/End Dates of Reporting Period:  3/14/23 to 23    Referring Provider: Lynn Tyler APRN CNP    Therapy Diagnosis: mid/LBP     Client Self Report: rotations noted with T11-L3 with extension. Pt notes much better appointment. She wishes to discharge after today and continue independently    Objective Measurements:  Objective Measure: ROM  Details: 100% in lumbar and thoracic without pain only stretch during rotation  Objective Measure: strength  Details: 5/5 hip flex; 5/5 abd;  5/5 glute max and med       Goals:  Goal Identifier ST goal   Goal Description Pt will have 100% of lumbar ROM to be able to ambulate without pain in 4 weeks.   Target Date 23   Date Met  23   Progress (detail required for progress note):       Goal Identifier LT goal   Goal Description Pt will have 5/5 hip strength and 3/5 core strength to stabilize the area when lifting and carrying in 10 weeks.   Target Date 23   Date Met  23   Progress (detail required for progress note):       Goal Identifier LT goal   Goal Description Pt will have a - Alexy test to allow full ROM mobility in 10 weeks to be able to ambulate without pain.   Target Date 23   Date Met      Progress (detail required for progress note): in progress- still present b/l     Goal Identifier LT goal   Goal Description Pt will be independent in home strengthening program for self management of Sx in 10 weeks.   Target Date 23   Date Met  23   Progress (detail required for progress note):       Plan:  Discharge from therapy.    Discharge:    Reason for Discharge: Patient chooses to discontinue therapy.    Equipment Issued: none    Discharge Plan: Patient to continue home program.

## 2023-08-02 ENCOUNTER — OFFICE VISIT (OUTPATIENT)
Dept: CARDIOLOGY | Facility: CLINIC | Age: 58
End: 2023-08-02
Attending: NURSE PRACTITIONER
Payer: COMMERCIAL

## 2023-08-02 VITALS
BODY MASS INDEX: 31.21 KG/M2 | RESPIRATION RATE: 16 BRPM | HEART RATE: 73 BPM | HEIGHT: 64 IN | SYSTOLIC BLOOD PRESSURE: 126 MMHG | OXYGEN SATURATION: 96 % | DIASTOLIC BLOOD PRESSURE: 89 MMHG | WEIGHT: 182.8 LBS

## 2023-08-02 DIAGNOSIS — I25.10 NONOBSTRUCTIVE ATHEROSCLEROSIS OF CORONARY ARTERY: ICD-10-CM

## 2023-08-02 DIAGNOSIS — E78.5 HYPERLIPIDEMIA LDL GOAL <70: ICD-10-CM

## 2023-08-02 DIAGNOSIS — R94.39 ABNORMAL STRESS ECG: ICD-10-CM

## 2023-08-02 PROCEDURE — 99214 OFFICE O/P EST MOD 30 MIN: CPT | Performed by: INTERNAL MEDICINE

## 2023-08-02 RX ORDER — ROSUVASTATIN CALCIUM 20 MG/1
20 TABLET, COATED ORAL DAILY
Qty: 90 TABLET | Refills: 3 | Status: SHIPPED | OUTPATIENT
Start: 2023-08-02 | End: 2024-07-01

## 2023-08-02 NOTE — LETTER
8/2/2023    Karol Yousif MD  [address]  [facility] [address]    RE: Francesca PARIKH Miguel       Dear Colleague,     I had the pleasure of seeing Francesca KATI Rivera in the [facility].      Cardiology Clinic        Assessment & Plan    1.  Exertional shortness of breath with cardiology diagnostic testing as below.  Coronary CTA with nonobstructive disease of  2.  Stress myocardial perfusion study with probable breast attenuation artifact in the anterior apical segments  3.  Preserved LV systolic function with no valvular heart disease  4.  Elevated BMI  5.  Hyperlipidemia, LDL 62  6.  Gastroesophageal reflux disease  7.  Asthma      Recommendations    1.  Exertional shortness of breath: Work-up has ensued including pulmonary function tests and echocardiogram along with Lexiscan.  Coronary CTA did not demonstrate any high-grade stenoses in the proximal vessels.  Mainly her symptoms have resolved although she feels that her fitness is not where it should be.  We have offered her cardiac rehab with structured exercise program and she is interested in this.  We will send a referral.    2.  Patient has gained weight over the last year and certainly deconditioning could be playing a role in her symptoms.    3.  Regarding her cholesterol her preference is to be on monotherapy.  We will intensify her cholesterol management by changing her over to Crestor 20 mg daily, we will discontinue the atorvastatin and Zetia.  Follow-up lab work next year.    4.  RTC in one year    Marjorie Shen MD, MD        HPI:      Patient is a pleasant 58-year-old woman who presents to establish local cardiac care.  She has had a coronary calcium score in the past that was elevated at 111 and she had been placed on appropriate medical therapy at that time.  More recently since snf patient has reported of some shortness of breath although she has gained weight during this timeframe.  She reports of  activities bringing her symptoms on.  She feels deconditioned.  Periodically at random times she will feel a chest pressure sensation that lasts a few seconds and subsides on its own.  Occurs with no warning and no aggravating or precipitating factors.  No relieving factors and the symptoms subside on their own.  A work-up has ensued including an EKG which demonstrates normal sinus rhythm.  She had an echocardiogram demonstrating preserved LV systolic function with no valvular abnormalities.  She had a stress nuclear myocardial perfusion study which was a Nela scan.  EKG portion demonstrated 0.5 mm ST segment changes in V3 V4 and V5 which is nondiagnostic.  In addition of this she had breast attenuation artifact with a small area ischemia noted in the anterior and apical segments of the left ventricle.  Ejection fraction was preserved consistent with her prior studies.      Subsequently she had a coronary CTA as noted below.  Was placed on medical therapy and her symptoms have mainly resolved.        Coronary CTA      CORONARY CALCIUM SCORE: The total Agatston calcium score is 178, Left  main: 0, left anterior descendin,  circumflex: 0.343, right  coronary artery: 8.95. This places the patient in the 97th percentile  when compared to age and gender matched control group.     CORONARY ANGIOGRAPHY : 1. Technically difficult study. The proximal  portion of the distal left anterior descending artery is  intramyocardial and the remainder of the distal left anterior  descending artery was small in caliber, poorly opacified and difficult  to assess for stenosis.  2. Mild proximal left anterior descending artery disease  3. Trivial circumflex disease  4. Trivial right coronary artery disease.            MPI:       The nuclear stress test is probably abnormal.    There is a small area of ischemia in the anterior and apical segment(s) of the left ventricle. There is breast tissue attenuation in this area  reducing specificity of these findings.    Left ventricular function is normal.    The left ventricular ejection fraction at stress is 67%.    There is no prior study for comparison.    Echo 2022    The left ventricle is normal in structure, function and size.  The visual ejection fraction is 55-60%.  The right ventricle is normal in structure, function and size.  Right ventricle systolic pressure estimate normal  Doppler interrogation does not demonstrate signficant stenosis or  insufficiency involving cardiac vavles     Coronary Calcium Score 2020: 111    Primary Care Physician  Karol Yousif      Patient Active Problem List   Diagnosis    Mild intermittent asthma    Allergic rhinitis    Esophageal reflux    Asymptomatic postmenopausal status    Post-menopausal atrophic vaginitis    Lichen sclerosis et atrophicus    Seasonal Moderate persistent asthma    Age-related osteoporosis without current pathological fracture    Cervical high risk HPV (human papillomavirus) test positive    Hyperlipidemia LDL goal <100       Past Medical History  I have reviewed this patient's medical history and updated it with pertinent information if needed.   Past Medical History:   Diagnosis Date    Asthma     Cervical high risk HPV (human papillomavirus) test positive 06/07/2019    See problem list    Tobacco use disorder 12/29/2005       Past Surgical History  I have reviewed this patient's surgical history and updated it with pertinent information if needed.  Past Surgical History:   Procedure Laterality Date    COLONOSCOPY N/A 5/20/2016    Procedure: COLONOSCOPY;  Surgeon: Darell Mayes MD;  Location: [facility]    COLONOSCOPY N/A 11/3/2021    Procedure: COLONOSCOPY, FLEXIBLE, WITH LESION REMOVAL USING SNARE;  Surgeon: Chris Alicia DO;  Location: [facility]    TUBAL LIGATION         Prior to Admission Medications  Cannot display prior to admission medications because the patient has not been admitted in this contact.      @[email or web address]@  [unfilled]  Allergies  Allergies   Allergen Reactions    Nka [No Known Allergies]        Social History   reports that she quit smoking about 13 years ago. Her smoking use included cigarettes. She has a 28.00 pack-year smoking history. She has never used smokeless tobacco. She reports current alcohol use. She reports that she does not use drugs.    Family History  Family History   Problem Relation Age of Onset    Alcohol/Drug Father     Diabetes Father     Hypertension Father     Psychotic Disorder Father         bipolar    Asthma Father     Cancer - colorectal Father 60    Depression Father     Cancer Maternal Grandfather         lung    Arthritis Maternal Grandfather     Cancer Paternal Grandmother         stomach    Psychotic Disorder Paternal Grandmother         bipolar    Depression Paternal Grandmother     Arthritis Paternal Grandmother     Breast Cancer Maternal Grandmother     Cancer - colorectal Maternal Grandmother     Arthritis Maternal Grandmother     Depression Daughter     Lupus Mother     C.A.D. Paternal Grandfather     Cerebrovascular Disease Paternal Grandfather     Arthritis Paternal Grandfather     Depression Son        Review of Systems  The comprehensive 10 point Review of Systems is negative other than noted in the HPI or here.     Physical Exam  Vital Signs with Ranges     Wt Readings from Last 4 Encounters:   03/06/23 83.9 kg (185 lb)   01/25/23 83.1 kg (183 lb 3.2 oz)   12/21/22 83 kg (183 lb)   11/30/22 82.8 kg (182 lb 9.6 oz)     [unfilled]      Vitals: LMP 08/15/2006     LMP 08/15/2006     GENERAL: Healthy, alert and no distress  EYES: Eyes grossly normal to inspection.  No discharge or erythema, or obvious scleral/conjunctival abnormalities.  RESP: No audible wheeze, cough, or visible cyanosis.  No visible retractions or increased work of breathing.    CV:  RRR  Lungs: CTA B  Abdomen: + BS Soft, NT  Ext: no edema  SKIN: Visible skin clear. No significant rash,  abnormal pigmentation or lesions.  NEURO: Cranial nerves grossly intact.  Mentation and speech appropriate for age.  PSYCH: Mentation appears normal, affect normal/bright, judgement and insight intact, normal speech and appearance well-groomed.    Thank you for allowing me to participate in the care of your patient.      Sincerely,     Marjorie Shen MD   [facility]  cc:   Georgiana Lewis, JAZIEL CNP  [address]  [place] [address]

## 2023-08-02 NOTE — PROGRESS NOTES
Cardiology Clinic        Assessment & Plan     1.  Exertional shortness of breath with cardiology diagnostic testing as below.  Coronary CTA with nonobstructive disease of  2.  Stress myocardial perfusion study with probable breast attenuation artifact in the anterior apical segments  3.  Preserved LV systolic function with no valvular heart disease  4.  Elevated BMI  5.  Hyperlipidemia, LDL 62  6.  Gastroesophageal reflux disease  7.  Asthma      Recommendations    1.  Exertional shortness of breath: Work-up has ensued including pulmonary function tests and echocardiogram along with Lexiscan.  Coronary CTA did not demonstrate any high-grade stenoses in the proximal vessels.  Mainly her symptoms have resolved although she feels that her fitness is not where it should be.  We have offered her cardiac rehab with structured exercise program and she is interested in this.  We will send a referral.    2.  Patient has gained weight over the last year and certainly deconditioning could be playing a role in her symptoms.    3.  Regarding her cholesterol her preference is to be on monotherapy.  We will intensify her cholesterol management by changing her over to Crestor 20 mg daily, we will discontinue the atorvastatin and Zetia.  Follow-up lab work next year.    4.  RTC in one year    Marjorie Shen MD, MD        HPI:      Patient is a pleasant 58-year-old woman who presents to establish local cardiac care.  She has had a coronary calcium score in the past that was elevated at 111 and she had been placed on appropriate medical therapy at that time.  More recently since [facility] patient has reported of some shortness of breath although she has gained weight during this timeframe.  She reports of activities bringing her symptoms on.  She feels deconditioned.  Periodically at random times she will feel a chest pressure sensation that lasts a few seconds and subsides on its own.  Occurs with no warning and no  aggravating or precipitating factors.  No relieving factors and the symptoms subside on their own.  A work-up has ensued including an EKG which demonstrates normal sinus rhythm.  She had an echocardiogram demonstrating preserved LV systolic function with no valvular abnormalities.  She had a stress nuclear myocardial perfusion study which was a Nela scan.  EKG portion demonstrated 0.5 mm ST segment changes in V3 V4 and V5 which is nondiagnostic.  In addition of this she had breast attenuation artifact with a small area ischemia noted in the anterior and apical segments of the left ventricle.  Ejection fraction was preserved consistent with her prior studies.      Subsequently she had a coronary CTA as noted below.  Was placed on medical therapy and her symptoms have mainly resolved.        Coronary CTA      CORONARY CALCIUM SCORE: The total Agatston calcium score is 178, Left  main: 0, left anterior descendin,  circumflex: 0.343, right  coronary artery: 8.95. This places the patient in the 97th percentile  when compared to age and gender matched control group.     CORONARY ANGIOGRAPHY : 1. Technically difficult study. The proximal  portion of the distal left anterior descending artery is  intramyocardial and the remainder of the distal left anterior  descending artery was small in caliber, poorly opacified and difficult  to assess for stenosis.  2. Mild proximal left anterior descending artery disease  3. Trivial circumflex disease  4. Trivial right coronary artery disease.            MPI:       The nuclear stress test is probably abnormal.    There is a small area of ischemia in the anterior and apical segment(s) of the left ventricle. There is breast tissue attenuation in this area reducing specificity of these findings.    Left ventricular function is normal.    The left ventricular ejection fraction at stress is 67%.    There is no prior study for comparison.    Echo     The left ventricle is  normal in structure, function and size.  The visual ejection fraction is 55-60%.  The right ventricle is normal in structure, function and size.  Right ventricle systolic pressure estimate normal  Doppler interrogation does not demonstrate signficant stenosis or  insufficiency involving cardiac vavles     Coronary Calcium Score 2020: 111    Primary Care Physician   Karol Yousif      Patient Active Problem List   Diagnosis    Mild intermittent asthma    Allergic rhinitis    Esophageal reflux    Asymptomatic postmenopausal status    Post-menopausal atrophic vaginitis    Lichen sclerosis et atrophicus    Seasonal Moderate persistent asthma    Age-related osteoporosis without current pathological fracture    Cervical high risk HPV (human papillomavirus) test positive    Hyperlipidemia LDL goal <100       Past Medical History   I have reviewed this patient's medical history and updated it with pertinent information if needed.   Past Medical History:   Diagnosis Date    Asthma     Cervical high risk HPV (human papillomavirus) test positive 06/07/2019    See problem list    Tobacco use disorder 12/29/2005       Past Surgical History   I have reviewed this patient's surgical history and updated it with pertinent information if needed.  Past Surgical History:   Procedure Laterality Date    COLONOSCOPY N/A 5/20/2016    Procedure: COLONOSCOPY;  Surgeon: Darell Mayes MD;  Location: [facility]    COLONOSCOPY N/A 11/3/2021    Procedure: COLONOSCOPY, FLEXIBLE, WITH LESION REMOVAL USING SNARE;  Surgeon: Chris Alicia DO;  Location: [facility]    TUBAL LIGATION         Prior to Admission Medications   Cannot display prior to admission medications because the patient has not been admitted in this contact.     [unfilled]  [unfilled]  Allergies   Allergies   Allergen Reactions    Nka [No Known Allergies]        Social History    reports that she quit smoking about 13 years ago. Her smoking use included cigarettes.  She has a 28.00 pack-year smoking history. She has never used smokeless tobacco. She reports current alcohol use. She reports that she does not use drugs.    Family History   Family History   Problem Relation Age of Onset    Alcohol/Drug Father     Diabetes Father     Hypertension Father     Psychotic Disorder Father         bipolar    Asthma Father     Cancer - colorectal Father 60    Depression Father     Cancer Maternal Grandfather         lung    Arthritis Maternal Grandfather     Cancer Paternal Grandmother         stomach    Psychotic Disorder Paternal Grandmother         bipolar    Depression Paternal Grandmother     Arthritis Paternal Grandmother     Breast Cancer Maternal Grandmother     Cancer - colorectal Maternal Grandmother     Arthritis Maternal Grandmother     Depression Daughter     Lupus Mother     C.A.D. Paternal Grandfather     Cerebrovascular Disease Paternal Grandfather     Arthritis Paternal Grandfather     Depression Son        Review of Systems   The comprehensive 10 point Review of Systems is negative other than noted in the HPI or here.     Physical Exam   Vital Signs with Ranges     Wt Readings from Last 4 Encounters:   03/06/23 83.9 kg (185 lb)   01/25/23 83.1 kg (183 lb 3.2 oz)   12/21/22 83 kg (183 lb)   11/30/22 82.8 kg (182 lb 9.6 oz)     [unfilled]      Vitals: LMP 08/15/2006     LMP 08/15/2006     GENERAL: Healthy, alert and no distress  EYES: Eyes grossly normal to inspection.  No discharge or erythema, or obvious scleral/conjunctival abnormalities.  RESP: No audible wheeze, cough, or visible cyanosis.  No visible retractions or increased work of breathing.    CV:  RRR  Lungs: CTA B  Abdomen: + BS Soft, NT  Ext: no edema  SKIN: Visible skin clear. No significant rash, abnormal pigmentation or lesions.  NEURO: Cranial nerves grossly intact.  Mentation and speech appropriate for age.  PSYCH: Mentation appears normal, affect normal/bright, judgement and insight intact, normal  speech and appearance well-groomed.

## 2023-08-29 ENCOUNTER — HOSPITAL ENCOUNTER (OUTPATIENT)
Dept: CARDIAC REHAB | Facility: CLINIC | Age: 58
Discharge: HOME | End: 2023-08-29
Attending: INTERNAL MEDICINE
Payer: COMMERCIAL

## 2023-08-29 DIAGNOSIS — I25.10 NONOBSTRUCTIVE ATHEROSCLEROSIS OF CORONARY ARTERY: ICD-10-CM

## 2023-08-29 PROCEDURE — 93797 PHYS/QHP OP CAR RHAB WO ECG: CPT | Mod: 59

## 2023-08-29 PROCEDURE — 93798 PHYS/QHP OP CAR RHAB W/ECG: CPT

## 2023-09-06 ENCOUNTER — PATIENT OUTREACH (OUTPATIENT)
Dept: CARE COORDINATION | Facility: CLINIC | Age: 58
End: 2023-09-06
Payer: COMMERCIAL

## 2023-09-06 ENCOUNTER — HOSPITAL ENCOUNTER (OUTPATIENT)
Dept: CARDIAC REHAB | Facility: CLINIC | Age: 58
Discharge: HOME | End: 2023-09-06
Attending: INTERNAL MEDICINE
Payer: COMMERCIAL

## 2023-09-06 PROCEDURE — 93797 PHYS/QHP OP CAR RHAB WO ECG: CPT

## 2023-09-06 PROCEDURE — 93798 PHYS/QHP OP CAR RHAB W/ECG: CPT

## 2023-09-11 ENCOUNTER — HOSPITAL ENCOUNTER (OUTPATIENT)
Dept: CARDIAC REHAB | Facility: CLINIC | Age: 58
Discharge: HOME | End: 2023-09-11
Attending: INTERNAL MEDICINE
Payer: COMMERCIAL

## 2023-09-11 ENCOUNTER — PATIENT OUTREACH (OUTPATIENT)
Dept: CARE COORDINATION | Facility: CLINIC | Age: 58
End: 2023-09-11
Payer: COMMERCIAL

## 2023-09-11 PROCEDURE — 93798 PHYS/QHP OP CAR RHAB W/ECG: CPT

## 2023-09-11 PROCEDURE — 93797 PHYS/QHP OP CAR RHAB WO ECG: CPT | Mod: 59

## 2023-09-13 ENCOUNTER — HOSPITAL ENCOUNTER (OUTPATIENT)
Dept: CARDIAC REHAB | Facility: CLINIC | Age: 58
Discharge: HOME | End: 2023-09-13
Attending: INTERNAL MEDICINE
Payer: COMMERCIAL

## 2023-09-13 PROCEDURE — 93798 PHYS/QHP OP CAR RHAB W/ECG: CPT

## 2023-09-13 PROCEDURE — 93797 PHYS/QHP OP CAR RHAB WO ECG: CPT

## 2023-09-18 ENCOUNTER — HOSPITAL ENCOUNTER (OUTPATIENT)
Dept: CARDIAC REHAB | Facility: CLINIC | Age: 58
Discharge: HOME | End: 2023-09-18
Attending: INTERNAL MEDICINE
Payer: COMMERCIAL

## 2023-09-18 ENCOUNTER — OFFICE VISIT (OUTPATIENT)
Dept: SPIRITUAL SERVICES | Facility: CLINIC | Age: 58
End: 2023-09-18

## 2023-09-18 ENCOUNTER — IMMUNIZATION (OUTPATIENT)
Dept: FAMILY MEDICINE | Facility: CLINIC | Age: 58
End: 2023-09-18
Payer: COMMERCIAL

## 2023-09-18 DIAGNOSIS — Z23 NEED FOR PROPHYLACTIC VACCINATION AND INOCULATION AGAINST INFLUENZA: Primary | ICD-10-CM

## 2023-09-18 PROCEDURE — 93798 PHYS/QHP OP CAR RHAB W/ECG: CPT

## 2023-09-18 PROCEDURE — 90682 RIV4 VACC RECOMBINANT DNA IM: CPT

## 2023-09-18 PROCEDURE — 93797 PHYS/QHP OP CAR RHAB WO ECG: CPT | Mod: 59

## 2023-09-18 PROCEDURE — 90471 IMMUNIZATION ADMIN: CPT

## 2023-09-18 NOTE — PROGRESS NOTES
" Spirituality Group Note    UNIT - [facility]    Name:    Francesca Rivera                                                                     YOB: 1965    MRN:     [id number]                                                                       Age: 58 year old      Patient attended -led group, which included discussion of Coping with Depression and Anxiety during Serious Illness, Emotional Responses to Cardiac Illness, and The Healing Process as it relates to coping with stress.    Patient attended group for 1.0 hrs.    The patient actively participated in group discussion even though she said her situation was more of a \"preventative nature.\"        Remigio Red M.A., [facility]  Staff    Spiritual Health Services  [facility]  [phone] (Office)  [phone] (Pager)  [email or web address]    "

## 2023-09-27 ENCOUNTER — HOSPITAL ENCOUNTER (OUTPATIENT)
Dept: CARDIAC REHAB | Facility: CLINIC | Age: 58
Discharge: HOME | End: 2023-09-27
Attending: INTERNAL MEDICINE
Payer: COMMERCIAL

## 2023-09-27 PROCEDURE — 93798 PHYS/QHP OP CAR RHAB W/ECG: CPT

## 2023-09-29 ENCOUNTER — HOSPITAL ENCOUNTER (OUTPATIENT)
Dept: CARDIAC REHAB | Facility: CLINIC | Age: 58
Discharge: HOME | End: 2023-09-29
Attending: INTERNAL MEDICINE
Payer: COMMERCIAL

## 2023-09-29 PROCEDURE — 93798 PHYS/QHP OP CAR RHAB W/ECG: CPT

## 2023-10-02 ENCOUNTER — HOSPITAL ENCOUNTER (OUTPATIENT)
Dept: CARDIAC REHAB | Facility: CLINIC | Age: 58
Discharge: HOME | End: 2023-10-02
Attending: INTERNAL MEDICINE
Payer: COMMERCIAL

## 2023-10-02 PROCEDURE — 93797 PHYS/QHP OP CAR RHAB WO ECG: CPT

## 2023-10-02 PROCEDURE — 93798 PHYS/QHP OP CAR RHAB W/ECG: CPT

## 2023-10-09 ENCOUNTER — PATIENT OUTREACH (OUTPATIENT)
Dept: CARE COORDINATION | Facility: CLINIC | Age: 58
End: 2023-10-09
Payer: COMMERCIAL

## 2023-10-09 ASSESSMENT — ASTHMA QUESTIONNAIRES: ACT_TOTALSCORE: 19

## 2023-10-09 ASSESSMENT — ENCOUNTER SYMPTOMS
PALPITATIONS: 0
COUGH: 0
DIZZINESS: 0
DIARRHEA: 0
HEADACHES: 0
DYSURIA: 0
SORE THROAT: 0
HEMATURIA: 0
WEAKNESS: 0
PARESTHESIAS: 0
ARTHRALGIAS: 0
HEMATOCHEZIA: 0
NERVOUS/ANXIOUS: 0
CONSTIPATION: 0
CHILLS: 0
FEVER: 0
NAUSEA: 0
SHORTNESS OF BREATH: 0
JOINT SWELLING: 0
ABDOMINAL PAIN: 0
MYALGIAS: 0
HEARTBURN: 0
FREQUENCY: 0
BREAST MASS: 0
EYE PAIN: 0

## 2023-10-11 ENCOUNTER — HOSPITAL ENCOUNTER (OUTPATIENT)
Dept: CARDIAC REHAB | Facility: CLINIC | Age: 58
Discharge: HOME | End: 2023-10-11
Attending: INTERNAL MEDICINE
Payer: COMMERCIAL

## 2023-10-11 ENCOUNTER — HOSPITAL ENCOUNTER (OUTPATIENT)
Dept: MAMMOGRAPHY | Facility: CLINIC | Age: 58
Discharge: HOME | End: 2023-10-11
Attending: FAMILY MEDICINE | Admitting: FAMILY MEDICINE
Payer: COMMERCIAL

## 2023-10-11 DIAGNOSIS — Z12.31 VISIT FOR SCREENING MAMMOGRAM: ICD-10-CM

## 2023-10-11 PROCEDURE — 77067 SCR MAMMO BI INCL CAD: CPT

## 2023-10-11 PROCEDURE — 93798 PHYS/QHP OP CAR RHAB W/ECG: CPT

## 2023-10-13 ENCOUNTER — HOSPITAL ENCOUNTER (OUTPATIENT)
Dept: CARDIAC REHAB | Facility: CLINIC | Age: 58
Discharge: HOME | End: 2023-10-13
Attending: INTERNAL MEDICINE
Payer: COMMERCIAL

## 2023-10-13 PROCEDURE — 93798 PHYS/QHP OP CAR RHAB W/ECG: CPT

## 2023-10-16 ENCOUNTER — OFFICE VISIT (OUTPATIENT)
Dept: FAMILY MEDICINE | Facility: CLINIC | Age: 58
End: 2023-10-16
Payer: COMMERCIAL

## 2023-10-16 ENCOUNTER — HOSPITAL ENCOUNTER (OUTPATIENT)
Dept: CARDIAC REHAB | Facility: CLINIC | Age: 58
Discharge: HOME | End: 2023-10-16
Attending: INTERNAL MEDICINE
Payer: COMMERCIAL

## 2023-10-16 ENCOUNTER — LAB (OUTPATIENT)
Dept: LAB | Facility: CLINIC | Age: 58
End: 2023-10-16
Payer: COMMERCIAL

## 2023-10-16 VITALS
OXYGEN SATURATION: 96 % | TEMPERATURE: 98.3 F | DIASTOLIC BLOOD PRESSURE: 74 MMHG | SYSTOLIC BLOOD PRESSURE: 130 MMHG | RESPIRATION RATE: 20 BRPM | HEIGHT: 64 IN | WEIGHT: 184 LBS | BODY MASS INDEX: 31.41 KG/M2 | HEART RATE: 72 BPM

## 2023-10-16 DIAGNOSIS — E78.5 HYPERLIPIDEMIA LDL GOAL <130: ICD-10-CM

## 2023-10-16 DIAGNOSIS — J45.40 MODERATE PERSISTENT ASTHMA WITHOUT COMPLICATION: ICD-10-CM

## 2023-10-16 DIAGNOSIS — N95.2 POST-MENOPAUSAL ATROPHIC VAGINITIS: ICD-10-CM

## 2023-10-16 DIAGNOSIS — M81.0 AGE-RELATED OSTEOPOROSIS WITHOUT CURRENT PATHOLOGICAL FRACTURE: ICD-10-CM

## 2023-10-16 DIAGNOSIS — J45.20 MILD INTERMITTENT ASTHMA WITHOUT COMPLICATION: ICD-10-CM

## 2023-10-16 DIAGNOSIS — Z00.00 ROUTINE GENERAL MEDICAL EXAMINATION AT A HEALTH CARE FACILITY: ICD-10-CM

## 2023-10-16 DIAGNOSIS — L94.0 CIRCUMSCRIBED SCLERODERMA: ICD-10-CM

## 2023-10-16 DIAGNOSIS — I25.118 ATHEROSCLEROTIC HEART DISEASE OF NATIVE CORONARY ARTERY WITH OTHER FORMS OF ANGINA PECTORIS (H): ICD-10-CM

## 2023-10-16 DIAGNOSIS — Z00.00 ROUTINE GENERAL MEDICAL EXAMINATION AT A HEALTH CARE FACILITY: Primary | ICD-10-CM

## 2023-10-16 LAB
ALBUMIN SERPL BCG-MCNC: 4.4 G/DL (ref 3.5–5.2)
ALP SERPL-CCNC: 65 U/L (ref 35–104)
ALT SERPL W P-5'-P-CCNC: 48 U/L (ref 0–50)
ANION GAP SERPL CALCULATED.3IONS-SCNC: 11 MMOL/L (ref 7–15)
AST SERPL W P-5'-P-CCNC: 51 U/L (ref 0–45)
BILIRUB SERPL-MCNC: 0.4 MG/DL
BUN SERPL-MCNC: 10.1 MG/DL (ref 6–20)
CALCIUM SERPL-MCNC: 9.7 MG/DL (ref 8.6–10)
CHLORIDE SERPL-SCNC: 101 MMOL/L (ref 98–107)
CREAT SERPL-MCNC: 0.86 MG/DL (ref 0.51–0.95)
DEPRECATED HCO3 PLAS-SCNC: 25 MMOL/L (ref 22–29)
EGFRCR SERPLBLD CKD-EPI 2021: 78 ML/MIN/1.73M2
GLUCOSE SERPL-MCNC: 93 MG/DL (ref 70–99)
POTASSIUM SERPL-SCNC: 4 MMOL/L (ref 3.4–5.3)
PROT SERPL-MCNC: 7.4 G/DL (ref 6.4–8.3)
SODIUM SERPL-SCNC: 137 MMOL/L (ref 135–145)

## 2023-10-16 PROCEDURE — 90677 PCV20 VACCINE IM: CPT | Performed by: FAMILY MEDICINE

## 2023-10-16 PROCEDURE — 93798 PHYS/QHP OP CAR RHAB W/ECG: CPT

## 2023-10-16 PROCEDURE — 99213 OFFICE O/P EST LOW 20 MIN: CPT | Mod: 25 | Performed by: FAMILY MEDICINE

## 2023-10-16 PROCEDURE — 36415 COLL VENOUS BLD VENIPUNCTURE: CPT

## 2023-10-16 PROCEDURE — 99396 PREV VISIT EST AGE 40-64: CPT | Mod: 25 | Performed by: FAMILY MEDICINE

## 2023-10-16 PROCEDURE — 80053 COMPREHEN METABOLIC PANEL: CPT

## 2023-10-16 PROCEDURE — 90471 IMMUNIZATION ADMIN: CPT | Performed by: FAMILY MEDICINE

## 2023-10-16 RX ORDER — CALCIUM/MAGNESIUM/ZINC 333-133 MG
TABLET ORAL
COMMUNITY
Start: 2022-10-01

## 2023-10-16 RX ORDER — ALBUTEROL SULFATE 90 UG/1
2 AEROSOL, METERED RESPIRATORY (INHALATION) EVERY 4 HOURS PRN
Qty: 36 G | Refills: 3 | Status: CANCELLED | OUTPATIENT
Start: 2023-10-16

## 2023-10-16 RX ORDER — ESTRADIOL 0.5 MG/1
TABLET ORAL
Qty: 24 TABLET | Refills: 3 | Status: CANCELLED | OUTPATIENT
Start: 2023-10-16

## 2023-10-16 RX ORDER — FLUTICASONE PROPIONATE AND SALMETEROL 250; 50 UG/1; UG/1
1 POWDER RESPIRATORY (INHALATION) EVERY 12 HOURS
Qty: 180 EACH | Refills: 3 | Status: CANCELLED | OUTPATIENT
Start: 2023-10-16

## 2023-10-16 RX ORDER — OMEPRAZOLE 40 MG/1
40 CAPSULE, DELAYED RELEASE ORAL DAILY
Qty: 90 CAPSULE | Refills: 3 | Status: SHIPPED | OUTPATIENT
Start: 2023-10-16 | End: 2024-09-16

## 2023-10-16 RX ORDER — IBANDRONATE SODIUM 150 MG/1
150 TABLET, FILM COATED ORAL
Qty: 3 TABLET | Refills: 3 | Status: SHIPPED | OUTPATIENT
Start: 2023-10-16 | End: 2024-09-16

## 2023-10-16 RX ORDER — CLOBETASOL PROPIONATE 0.5 MG/G
OINTMENT TOPICAL
Qty: 45 G | Refills: 3 | Status: CANCELLED | OUTPATIENT
Start: 2023-10-16

## 2023-10-16 ASSESSMENT — ENCOUNTER SYMPTOMS
HEMATURIA: 0
HEARTBURN: 0
HEADACHES: 0
NERVOUS/ANXIOUS: 0
NAUSEA: 0
CHILLS: 0
BREAST MASS: 0
SHORTNESS OF BREATH: 0
PALPITATIONS: 0
DIZZINESS: 0
FREQUENCY: 0
COUGH: 0
MYALGIAS: 0
JOINT SWELLING: 0
FEVER: 0
WEAKNESS: 0
ABDOMINAL PAIN: 0
DYSURIA: 0
SORE THROAT: 0
EYE PAIN: 0
CONSTIPATION: 0
PARESTHESIAS: 0
HEMATOCHEZIA: 0
DIARRHEA: 0
ARTHRALGIAS: 0

## 2023-10-16 ASSESSMENT — PAIN SCALES - GENERAL: PAINLEVEL: NO PAIN (0)

## 2023-10-16 NOTE — LETTER
My Asthma Action Plan    Name: Francesca Rivera   YOB: 1965  Date: 10/16/2023   My doctor: Karol Yousif MD   My clinic: [facility]        My Rescue Medicine:   Albuterol inhaler (Proair/Ventolin/Proventil HFA)  2-4 puffs EVERY 4 HOURS as needed. Use a spacer if recommended by your provider.   My Asthma Severity:   Intermittent / Exercise Induced  Know your asthma triggers:   humidity  cold air          GREEN ZONE   Good Control  I feel good  No cough or wheeze  Can work, sleep and play without asthma symptoms       Take your asthma control medicine every day.     If exercise triggers your asthma, take your rescue medication  15 minutes before exercise or sports, and  During exercise if you have asthma symptoms  Spacer to use with inhaler: If you have a spacer, make sure to use it with your inhaler             YELLOW ZONE Getting Worse  I have ANY of these:  I do not feel good  Cough or wheeze  Chest feels tight  Wake up at night   Keep taking your Green Zone medications  Start taking your rescue medicine:  every 20 minutes for up to 1 hour. Then every 4 hours for 24-48 hours.  If you stay in the Yellow Zone for more than 12-24 hours, contact your doctor.  If you do not return to the Green Zone in 12-24 hours or you get worse, start taking your oral steroid medicine if prescribed by your provider.           RED ZONE Medical Alert - Get Help  I have ANY of these:  I feel awful  Medicine is not helping  Breathing getting harder  Trouble walking or talking  Nose opens wide to breathe       Take your rescue medicine NOW  If your provider has prescribed an oral steroid medicine, start taking it NOW  Call your doctor NOW  If you are still in the Red Zone after 20 minutes and you have not reached your doctor:  Take your rescue medicine again and  Call 911 or go to the emergency room right away    See your regular doctor within 2 weeks of an Emergency Room or Urgent Care visit  for follow-up treatment.          Annual Reminders:  Meet with Asthma Educator,  Flu Shot in the Fall, consider Pneumonia Vaccination for patients with asthma (aged 19 and older).    Pharmacy:    [facility] - [place], [place] - [address] [facility] [address] [address]AL TO [facility] S[address]  [facility] - [place], [place] - [address] [facility] - [place], [place] - [address] [facility] [address]    Electronically signed by Karol Yousif MD   Date: 10/16/23                    Asthma Triggers  How To Control Things That Make Your Asthma Worse    Triggers are things that make your asthma worse.  Look at the list below to help you find your triggers and   what you can do about them. You can help prevent asthma flare-ups by staying away from your triggers.      Trigger                                                          What you can do   Cigarette Smoke  Tobacco smoke can make asthma worse. Do not allow smoking in your home, car or around you.  Be sure no one smokes at a child s day care or school.  If you smoke, ask your health care provider for ways to help you quit.  Ask family members to quit too.  Ask your health care provider for a referral to Quit Plan to help you quit smoking, or call 9-602-762-PLAN.     Colds, Flu, Bronchitis  These are common triggers of asthma. Wash your hands often.  Don t touch your eyes, nose or mouth.  Get a flu shot every year.     Dust Mites  These are tiny bugs that live in cloth or carpet. They are too small to see. Wash sheets and blankets in hot water every week.   Encase pillows and mattress in dust mite proof covers.  Avoid having carpet if you can. If you have carpet, vacuum weekly.   Use a dust mask and HEPA vacuum.   Pollen and Outdoor Mold  Some people are allergic to trees, grass, or weed pollen, or molds. Try to keep your windows closed.  Limit time out  doors when pollen count is high.   Ask you health care provider about taking medicine during allergy season.     Animal Dander  Some people are allergic to skin flakes, urine or saliva from pets with fur or feathers. Keep pets with fur or feathers out of your home.    If you can t keep the pet outdoors, then keep the pet out of your bedroom.  Keep the bedroom door closed.  Keep pets off cloth furniture and away from stuffed toys.     Mice, Rats, and Cockroaches  Some people are allergic to the waste from these pests.   Cover food and garbage.  Clean up spills and food crumbs.  Store grease in the refrigerator.   Keep food out of the bedroom.   Indoor Mold  This can be a trigger if your home has high moisture. Fix leaking faucets, pipes, or other sources of water.   Clean moldy surfaces.  Dehumidify basement if it is damp and smelly.   Smoke, Strong Odors, and Sprays  These can reduce air quality. Stay away from strong odors and sprays, such as perfume, powder, hair spray, paints, smoke incense, paint, cleaning products, candles and new carpet.   Exercise or Sports  Some people with asthma have this trigger. Be active!  Ask your doctor about taking medicine before sports or exercise to prevent symptoms.    Warm up for 5-10 minutes before and after sports or exercise.     Other Triggers of Asthma  Cold air:  Cover your nose and mouth with a scarf.  Sometimes laughing or crying can be a trigger.  Some medicines and food can trigger asthma.

## 2023-10-16 NOTE — PROGRESS NOTES
SUBJECTIVE:   CC: Jessica is an 58 year old who presents for preventive health visit.       10/16/2023     2:38 PM   Additional Questions   Roomed by Adelaida WELHC CMA   Accompanied by Self       Healthy Habits:     Getting at least 3 servings of Calcium per day:  NO    Bi-annual eye exam:  Yes    Dental care twice a year:  Yes    Sleep apnea or symptoms of sleep apnea:  None    Diet:  Low salt    Frequency of exercise:  4-5 days/week    Duration of exercise:  30-45 minutes    Taking medications regularly:  Yes    Medication side effects:  None    Additional concerns today:  No              Asthma Follow-Up  Albuterol 108mcg/ACT 2 puffs q4h prn, Advair 250-50mg bid  Was ACT completed today?  Yes        10/9/2023     6:46 PM   ACT Total Scores   ACT TOTAL SCORE (Goal Greater than or Equal to 20) 19   In the past 12 months, how many times did you visit the emergency room for your asthma without being admitted to the hospital? 0   In the past 12 months, how many times were you hospitalized overnight because of your asthma? 0       How many days per week do you miss taking your asthma controller medication?  0  Please describe any recent triggers for your asthma: humidity and cold air  Have you had any Emergency Room Visits, Urgent Care Visits, or Hospital Admissions since your last office visit?  No      Social History     Tobacco Use    Smoking status: Former     Packs/day: 1.00     Years: 28.00     Additional pack years: 0.00     Total pack years: 28.00     Types: Cigarettes     Quit date: 2010     Years since quittin.4    Smokeless tobacco: Never   Substance Use Topics    Alcohol use: Yes     Comment: occ             10/9/2023     6:45 PM   Alcohol Use   Prescreen: >3 drinks/day or >7 drinks/week? No     Reviewed orders with patient.  Reviewed health maintenance and updated orders accordingly - Yes      Breast Cancer Screening:    FHS-7:       2021     3:27 PM 10/8/2021     9:01 AM 2022     8:46 AM  10/10/2022     1:36 PM 10/9/2023     6:49 PM 10/11/2023    12:22 PM   Breast CA Risk Assessment (FHS-7)   Did any of your first-degree relatives have breast or ovarian cancer? No Yes No No Yes No   Did any of your relatives have bilateral breast cancer? No Unknown Unknown No Unknown No   Did any man in your family have breast cancer? No No No No No No   Did any woman in your family have breast and ovarian cancer? No Yes No No No No   Did any woman in your family have breast cancer before age 50 y? No No No No No No   Do you have 2 or more relatives with breast and/or ovarian cancer? No No No No No No   Do you have 2 or more relatives with breast and/or bowel cancer? Yes No No No No No       Mammogram Screening: Recommended annual mammography  Pertinent mammograms are reviewed under the imaging tab.    History of abnormal Pap smear: needs pap 10/2024. Health Maintenance and Surgical History updated.      Latest Ref Rng & Units 10/8/2021     8:58 AM 9/2/2020     7:50 AM 9/2/2020     7:35 AM   PAP / HPV   PAP  Negative for Intraepithelial Lesion or Malignancy (NILM)      PAP (Historical)    NIL    HPV 16 DNA Negative Negative  Negative     HPV 18 DNA Negative Negative  Negative     Other HR HPV Negative Negative  Negative       Reviewed and updated as needed this visit by clinical staff   Tobacco  Allergies               Reviewed and updated as needed this visit by Provider                     Review of Systems   Constitutional:  Negative for chills and fever.   HENT:  Negative for congestion, ear pain, hearing loss and sore throat.    Eyes:  Negative for pain and visual disturbance.   Respiratory:  Negative for cough and shortness of breath.    Cardiovascular:  Negative for chest pain, palpitations and peripheral edema.   Gastrointestinal:  Negative for abdominal pain, constipation, diarrhea, heartburn, hematochezia and nausea.   Breasts:  Negative for tenderness, breast mass and discharge.   Genitourinary:   "Negative for dysuria, frequency, genital sores, hematuria, pelvic pain, urgency, vaginal bleeding and vaginal discharge.   Musculoskeletal:  Negative for arthralgias, joint swelling and myalgias.   Skin:  Negative for rash.   Neurological:  Negative for dizziness, weakness, headaches and paresthesias.   Psychiatric/Behavioral:  Negative for mood changes. The patient is not nervous/anxious.           OBJECTIVE:   /74   Pulse 72   Temp 98.3  F (36.8  C) (Tympanic)   Resp 20   Ht 1.626 m (5' 4\")   Wt 83.5 kg (184 lb)   LMP 08/15/2006   SpO2 96%   BMI 31.58 kg/m    Physical Exam  GENERAL: healthy, alert and no distress  NECK: no adenopathy, no asymmetry, masses, or scars and thyroid normal to palpation  RESP: lungs clear to auscultation - no rales, rhonchi or wheezes  CV: regular rate and rhythm, normal S1 S2, no S3 or S4, no murmur, click or rub, no peripheral edema and peripheral pulses strong  MS: no gross musculoskeletal defects noted, no edema  NEURO: Normal strength and tone, mentation intact and speech normal  PSYCH: mentation appears normal, affect normal/bright    Diagnostic Test Results:  Labs reviewed in Epic    ASSESSMENT/PLAN:   (Z00.00) Routine general medical examination at a health care facility  (primary encounter diagnosis)  Comment:  didn't want all medications sent as pharmacy tends to just mail them out even if she doesn't need them.  Plan: Comprehensive metabolic panel (BMP + Alb, Alk         Phos, ALT, AST, Total. Bili, TP)             (N95.2) Post-menopausal atrophic vaginitis  Comment:    Plan: continue vaginal estrogen    (J45.40) Moderate persistent asthma without complication  Comment:    Plan: stable, continue advair    (M81.0) Age-related osteoporosis without current pathological fracture  Comment:  dexa in a year  Plan: IBANdronate (BONIVA) 150 MG tablet             (E78.5) Hyperlipidemia LDL goal <130  Comment:    Plan: omeprazole (PRILOSEC) 40 MG DR capsule,         " "Comprehensive metabolic panel (BMP + Alb, Alk         Phos, ALT, AST, Total. Bili, TP)             (L94.0) Lichen sclerosis et atrophicus  Comment:  continue clobetasol as needed  Plan:      (I25.118) Atherosclerotic heart disease of native coronary artery with other forms of angina pectoris (H24)  Comment: doing cardiac rehabilitation currently and that is going well.  Helping with endurance  Plan:      (J45.20) Mild intermittent asthma without complication  Comment:  stable  Plan:      Patient has been advised of split billing requirements and indicates understanding: Yes      COUNSELING:  Reviewed preventive health counseling, as reflected in patient instructions       Regular exercise       Healthy diet/nutrition      BMI:   Estimated body mass index is 31.58 kg/m  as calculated from the following:    Height as of this encounter: 1.626 m (5' 4\").    Weight as of this encounter: 83.5 kg (184 lb).         She reports that she quit smoking about 13 years ago. Her smoking use included cigarettes. She has a 28.00 pack-year smoking history. She has never used smokeless tobacco.        Karol Yousif MD  [facility]  "

## 2023-10-18 ENCOUNTER — HOSPITAL ENCOUNTER (OUTPATIENT)
Dept: CARDIAC REHAB | Facility: CLINIC | Age: 58
Discharge: HOME | End: 2023-10-18
Attending: INTERNAL MEDICINE
Payer: COMMERCIAL

## 2023-10-18 PROCEDURE — 93798 PHYS/QHP OP CAR RHAB W/ECG: CPT

## 2023-10-18 PROCEDURE — 93797 PHYS/QHP OP CAR RHAB WO ECG: CPT | Mod: 59

## 2023-10-20 ENCOUNTER — HOSPITAL ENCOUNTER (OUTPATIENT)
Dept: CARDIAC REHAB | Facility: CLINIC | Age: 58
Discharge: HOME | End: 2023-10-20
Attending: INTERNAL MEDICINE
Payer: COMMERCIAL

## 2023-10-20 PROCEDURE — 93798 PHYS/QHP OP CAR RHAB W/ECG: CPT

## 2023-10-23 ENCOUNTER — HOSPITAL ENCOUNTER (OUTPATIENT)
Dept: CARDIAC REHAB | Facility: CLINIC | Age: 58
Discharge: HOME | End: 2023-10-23
Attending: INTERNAL MEDICINE
Payer: COMMERCIAL

## 2023-10-23 PROCEDURE — 93798 PHYS/QHP OP CAR RHAB W/ECG: CPT

## 2023-10-25 ENCOUNTER — HOSPITAL ENCOUNTER (OUTPATIENT)
Dept: CARDIAC REHAB | Facility: CLINIC | Age: 58
Discharge: HOME | End: 2023-10-25
Attending: INTERNAL MEDICINE
Payer: COMMERCIAL

## 2023-10-25 ENCOUNTER — IMMUNIZATION (OUTPATIENT)
Dept: FAMILY MEDICINE | Facility: CLINIC | Age: 58
End: 2023-10-25
Payer: COMMERCIAL

## 2023-10-25 PROCEDURE — 91320 SARSCV2 VAC 30MCG TRS-SUC IM: CPT

## 2023-10-25 PROCEDURE — 93798 PHYS/QHP OP CAR RHAB W/ECG: CPT

## 2023-10-25 PROCEDURE — 93797 PHYS/QHP OP CAR RHAB WO ECG: CPT | Mod: 59

## 2023-10-25 PROCEDURE — 90480 ADMN SARSCOV2 VAC 1/ONLY CMP: CPT

## 2023-10-27 ENCOUNTER — HOSPITAL ENCOUNTER (OUTPATIENT)
Dept: CARDIAC REHAB | Facility: CLINIC | Age: 58
Discharge: HOME | End: 2023-10-27
Attending: INTERNAL MEDICINE
Payer: COMMERCIAL

## 2023-10-27 PROCEDURE — 93798 PHYS/QHP OP CAR RHAB W/ECG: CPT

## 2023-10-30 ENCOUNTER — HOSPITAL ENCOUNTER (OUTPATIENT)
Dept: CARDIAC REHAB | Facility: CLINIC | Age: 58
Discharge: HOME | End: 2023-10-30
Attending: INTERNAL MEDICINE
Payer: COMMERCIAL

## 2023-10-30 PROCEDURE — 93798 PHYS/QHP OP CAR RHAB W/ECG: CPT

## 2023-11-03 ENCOUNTER — HOSPITAL ENCOUNTER (OUTPATIENT)
Dept: CARDIAC REHAB | Facility: CLINIC | Age: 58
Discharge: HOME | End: 2023-11-03
Attending: INTERNAL MEDICINE
Payer: COMMERCIAL

## 2023-11-03 PROCEDURE — 93798 PHYS/QHP OP CAR RHAB W/ECG: CPT | Performed by: REHABILITATION PRACTITIONER

## 2023-11-06 ENCOUNTER — HOSPITAL ENCOUNTER (OUTPATIENT)
Dept: CARDIAC REHAB | Facility: CLINIC | Age: 58
Discharge: HOME | End: 2023-11-06
Attending: INTERNAL MEDICINE
Payer: COMMERCIAL

## 2023-11-06 PROCEDURE — 93798 PHYS/QHP OP CAR RHAB W/ECG: CPT

## 2023-11-08 ENCOUNTER — HOSPITAL ENCOUNTER (OUTPATIENT)
Dept: CARDIAC REHAB | Facility: CLINIC | Age: 58
Discharge: HOME | End: 2023-11-08
Attending: INTERNAL MEDICINE
Payer: COMMERCIAL

## 2023-11-08 PROCEDURE — 93798 PHYS/QHP OP CAR RHAB W/ECG: CPT

## 2023-11-10 ENCOUNTER — HOSPITAL ENCOUNTER (OUTPATIENT)
Dept: CARDIAC REHAB | Facility: CLINIC | Age: 58
Discharge: HOME | End: 2023-11-10
Attending: INTERNAL MEDICINE
Payer: COMMERCIAL

## 2023-11-10 PROCEDURE — 93798 PHYS/QHP OP CAR RHAB W/ECG: CPT

## 2023-11-13 ENCOUNTER — HOSPITAL ENCOUNTER (OUTPATIENT)
Dept: CARDIAC REHAB | Facility: CLINIC | Age: 58
Discharge: HOME | End: 2023-11-13
Attending: INTERNAL MEDICINE
Payer: COMMERCIAL

## 2023-11-13 PROCEDURE — 93798 PHYS/QHP OP CAR RHAB W/ECG: CPT

## 2023-11-15 ENCOUNTER — HOSPITAL ENCOUNTER (OUTPATIENT)
Dept: CARDIAC REHAB | Facility: CLINIC | Age: 58
Discharge: HOME | End: 2023-11-15
Attending: INTERNAL MEDICINE
Payer: COMMERCIAL

## 2023-11-15 PROCEDURE — 93798 PHYS/QHP OP CAR RHAB W/ECG: CPT

## 2023-11-22 ENCOUNTER — HOSPITAL ENCOUNTER (OUTPATIENT)
Dept: CARDIAC REHAB | Facility: CLINIC | Age: 58
Discharge: HOME | End: 2023-11-22
Attending: INTERNAL MEDICINE
Payer: COMMERCIAL

## 2023-11-22 PROCEDURE — 93798 PHYS/QHP OP CAR RHAB W/ECG: CPT

## 2023-11-24 ENCOUNTER — HOSPITAL ENCOUNTER (OUTPATIENT)
Dept: CARDIAC REHAB | Facility: CLINIC | Age: 58
Discharge: HOME | End: 2023-11-24
Attending: INTERNAL MEDICINE
Payer: COMMERCIAL

## 2023-11-24 PROCEDURE — 93798 PHYS/QHP OP CAR RHAB W/ECG: CPT

## 2023-11-29 ENCOUNTER — HOSPITAL ENCOUNTER (OUTPATIENT)
Dept: CARDIAC REHAB | Facility: CLINIC | Age: 58
Discharge: HOME | End: 2023-11-29
Attending: INTERNAL MEDICINE
Payer: COMMERCIAL

## 2023-11-29 PROCEDURE — 93798 PHYS/QHP OP CAR RHAB W/ECG: CPT

## 2023-12-01 ENCOUNTER — LAB (OUTPATIENT)
Dept: LAB | Facility: CLINIC | Age: 58
End: 2023-12-01
Payer: COMMERCIAL

## 2023-12-01 ENCOUNTER — HOSPITAL ENCOUNTER (OUTPATIENT)
Dept: CARDIAC REHAB | Facility: CLINIC | Age: 58
Discharge: HOME | End: 2023-12-01
Attending: INTERNAL MEDICINE
Payer: COMMERCIAL

## 2023-12-01 DIAGNOSIS — E78.5 HYPERLIPIDEMIA LDL GOAL <70: ICD-10-CM

## 2023-12-01 LAB
ALT SERPL W P-5'-P-CCNC: 25 U/L (ref 0–50)
AST SERPL W P-5'-P-CCNC: 24 U/L (ref 0–45)

## 2023-12-01 PROCEDURE — 84460 ALANINE AMINO (ALT) (SGPT): CPT | Performed by: INTERNAL MEDICINE

## 2023-12-01 PROCEDURE — 93798 PHYS/QHP OP CAR RHAB W/ECG: CPT

## 2023-12-01 PROCEDURE — 36415 COLL VENOUS BLD VENIPUNCTURE: CPT | Performed by: INTERNAL MEDICINE

## 2023-12-01 PROCEDURE — 84450 TRANSFERASE (AST) (SGOT): CPT | Performed by: INTERNAL MEDICINE

## 2024-01-15 ENCOUNTER — MYC REFILL (OUTPATIENT)
Dept: CARDIOLOGY | Facility: CLINIC | Age: 59
End: 2024-01-15
Payer: COMMERCIAL

## 2024-01-15 ENCOUNTER — MYC MEDICAL ADVICE (OUTPATIENT)
Dept: FAMILY MEDICINE | Facility: CLINIC | Age: 59
End: 2024-01-15
Payer: COMMERCIAL

## 2024-01-15 DIAGNOSIS — J30.89 OTHER ALLERGIC RHINITIS: ICD-10-CM

## 2024-01-15 DIAGNOSIS — J45.40 MODERATE PERSISTENT ASTHMA WITHOUT COMPLICATION: ICD-10-CM

## 2024-01-15 DIAGNOSIS — I25.10 NONOBSTRUCTIVE ATHEROSCLEROSIS OF CORONARY ARTERY: ICD-10-CM

## 2024-01-15 DIAGNOSIS — N95.2 POST-MENOPAUSAL ATROPHIC VAGINITIS: ICD-10-CM

## 2024-01-15 RX ORDER — ESTRADIOL 0.5 MG/1
TABLET ORAL
Qty: 24 TABLET | Refills: 2 | Status: SHIPPED | OUTPATIENT
Start: 2024-01-15 | End: 2024-09-16

## 2024-01-15 RX ORDER — FLUTICASONE PROPIONATE 50 MCG
2 SPRAY, SUSPENSION (ML) NASAL DAILY
Qty: 48 G | Refills: 4 | Status: SHIPPED | OUTPATIENT
Start: 2024-01-15

## 2024-01-15 ASSESSMENT — ASTHMA QUESTIONNAIRES
QUESTION_5 LAST FOUR WEEKS HOW WOULD YOU RATE YOUR ASTHMA CONTROL: SOMEWHAT CONTROLLED
QUESTION_4 LAST FOUR WEEKS HOW OFTEN HAVE YOU USED YOUR RESCUE INHALER OR NEBULIZER MEDICATION (SUCH AS ALBUTEROL): ONCE A WEEK OR LESS
QUESTION_1 LAST FOUR WEEKS HOW MUCH OF THE TIME DID YOUR ASTHMA KEEP YOU FROM GETTING AS MUCH DONE AT WORK, SCHOOL OR AT HOME: NONE OF THE TIME
QUESTION_2 LAST FOUR WEEKS HOW OFTEN HAVE YOU HAD SHORTNESS OF BREATH: MORE THAN ONCE A DAY
QUESTION_3 LAST FOUR WEEKS HOW OFTEN DID YOUR ASTHMA SYMPTOMS (WHEEZING, COUGHING, SHORTNESS OF BREATH, CHEST TIGHTNESS OR PAIN) WAKE YOU UP AT NIGHT OR EARLIER THAN USUAL IN THE MORNING: ONCE OR TWICE
ACT_TOTALSCORE: 17
ACT_TOTALSCORE: 17

## 2024-01-15 NOTE — TELEPHONE ENCOUNTER
Pending Prescriptions:                       Disp   Refills    estradiol (ESTRACE) 0.5 MG tablet         24 tab*3            Si.5 mg tablet inserted vaginally twice a week    Elina Dover on 1/15/2024 at 3:52 PM

## 2024-01-15 NOTE — TELEPHONE ENCOUNTER
"Routing refill request to provider for review/approval because:  ACT needed   Sent via my chart    Pending Prescriptions:                       Disp   Refills    albuterol (VENTOLIN HFA) 108 (90 Base) MC*36 g   3            Sig: Inhale 2 puffs into the lungs every 4 hours as           needed for shortness of breath or wheezing FILL           ALBUTEROL BRAND/GENERIC AS COVERED    Signed Prescriptions:                        Disp   Refills    estradiol (ESTRACE) 0.5 MG tablet          24 tab*2        Si.5 mg tablet inserted vaginally twice a week  Authorizing Provider: LISA ISIDRO  Ordering User: NANDA RUBIO    fluticasone (FLONASE) 50 MCG/ACT nasal spr*48 g   4        Sig: Spray 2 sprays into both nostrils daily  Authorizing Provider: LISA ISIDRO  Ordering User: GERALDINE HAGEN      Requested Prescriptions   Pending Prescriptions Disp Refills    albuterol (VENTOLIN HFA) 108 (90 Base) MCG/ACT inhaler 36 g 3     Sig: Inhale 2 puffs into the lungs every 4 hours as needed for shortness of breath or wheezing FILL ALBUTEROL BRAND/GENERIC AS COVERED       Asthma Maintenance Inhalers - Anticholinergics Failed - 1/15/2024  4:45 PM        Failed - Asthma control assessment score within normal limits in last 6 months     Please review ACT score.           Passed - Patient is age 12 years or older        Passed - Medication is active on med list        Passed - Recent (6 mo) or future (30 days) visit within the authorizing provider's specialty     Patient had office visit in the last 6 months or has a visit in the next 30 days with authorizing provider or within the authorizing provider's specialty.  See \"Patient Info\" tab in inbasket, or \"Choose Columns\" in Meds & Orders section of the refill encounter.           Short-Acting Beta Agonist Inhalers Protocol  Failed - 1/15/2024  4:45 PM        Failed - Asthma control assessment score within normal limits in last 6 months     Please review ACT score.           " "Passed - Patient is age 12 or older        Passed - Medication is active on med list        Passed - Recent (6 mo) or future (30 days) visit within the authorizing provider's specialty     Patient had office visit in the last 6 months or has a visit in the next 30 days with authorizing provider or within the authorizing provider's specialty.  See \"Patient Info\" tab in inbasket, or \"Choose Columns\" in Meds & Orders section of the refill encounter.             Signed Prescriptions Disp Refills    estradiol (ESTRACE) 0.5 MG tablet 24 tablet 2     Si.5 mg tablet inserted vaginally twice a week       Hormone Replacement Therapy Passed - 1/15/2024  3:52 PM        Passed - Blood pressure under 140/90 in past 12 months     BP Readings from Last 3 Encounters:   10/16/23 130/74   23 126/89   23 112/68                 Passed - Recent (12 mo) or future (30 days) visit within the authorizing provider's specialty     The patient must have completed an in-person or virtual visit within the past 12 months or has a future visit scheduled within the next 90 days with the authorizing provider s specialty.  Urgent care and e-visits do not quality as an office visit for this protocol.          Passed - Patient has mammogram in past 2 years on file if age 50-75        Passed - Medication is active on med list        Passed - Patient is 18 years of age or older        Passed - No active pregnancy on record        Passed - No positive pregnancy test on record in past 12 months          fluticasone (FLONASE) 50 MCG/ACT nasal spray 48 g 4     Sig: Spray 2 sprays into both nostrils daily       Nasal Allergy Protocol Passed - 1/15/2024  4:45 PM        Passed - Patient is age 12 or older        Passed - Recent (12 mo) or future (30 days) visit within the authorizing provider's specialty     The patient must have completed an in-person or virtual visit within the past 12 months or has a future visit scheduled within the next " days with the authorizing provider s specialty.  Urgent care and e-visits do not quality as an office visit for this protocol.          Passed - Medication is active on med list           Ada Cordova RN on 1/15/2024 at 4:53 PM

## 2024-01-16 DIAGNOSIS — J45.40 MODERATE PERSISTENT ASTHMA WITHOUT COMPLICATION: ICD-10-CM

## 2024-01-16 RX ORDER — ALBUTEROL SULFATE 90 UG/1
2 AEROSOL, METERED RESPIRATORY (INHALATION) EVERY 4 HOURS PRN
Qty: 36 G | Refills: 3 | Status: SHIPPED | OUTPATIENT
Start: 2024-01-16

## 2024-01-16 RX ORDER — METOPROLOL SUCCINATE 25 MG/1
25 TABLET, EXTENDED RELEASE ORAL DAILY
Qty: 90 TABLET | Refills: 1 | Status: SHIPPED | OUTPATIENT
Start: 2024-01-16 | End: 2024-07-01

## 2024-01-16 NOTE — TELEPHONE ENCOUNTER
[facility] Cardiology Refill Guideline reviewed.  Medication meets criteria for refill.    Yumiko Campos, RN

## 2024-01-18 RX ORDER — FLUTICASONE PROPIONATE AND SALMETEROL 250; 50 UG/1; UG/1
1 POWDER RESPIRATORY (INHALATION) EVERY 12 HOURS
Qty: 180 EACH | Refills: 3 | Status: SHIPPED | OUTPATIENT
Start: 2024-01-18

## 2024-03-20 ENCOUNTER — MYC MEDICAL ADVICE (OUTPATIENT)
Dept: FAMILY MEDICINE | Facility: CLINIC | Age: 59
End: 2024-03-20
Payer: COMMERCIAL

## 2024-03-20 DIAGNOSIS — T75.3XXD MOTION SICKNESS, SUBSEQUENT ENCOUNTER: Primary | ICD-10-CM

## 2024-03-20 RX ORDER — SCOLOPAMINE TRANSDERMAL SYSTEM 1 MG/1
1 PATCH, EXTENDED RELEASE TRANSDERMAL
Qty: 4 PATCH | Refills: 0 | Status: SHIPPED | OUTPATIENT
Start: 2024-03-20

## 2024-04-03 ENCOUNTER — DOCUMENTATION ONLY (OUTPATIENT)
Dept: OTHER | Facility: CLINIC | Age: 59
End: 2024-04-03
Payer: COMMERCIAL

## 2024-06-28 ENCOUNTER — MYC MEDICAL ADVICE (OUTPATIENT)
Dept: CARDIOLOGY | Facility: CLINIC | Age: 59
End: 2024-06-28
Payer: COMMERCIAL

## 2024-06-28 ENCOUNTER — TRANSFERRED RECORDS (OUTPATIENT)
Dept: HEALTH INFORMATION MANAGEMENT | Facility: CLINIC | Age: 59
End: 2024-06-28

## 2024-06-28 DIAGNOSIS — E78.5 HYPERLIPIDEMIA LDL GOAL <70: Primary | ICD-10-CM

## 2024-07-01 DIAGNOSIS — I25.10 NONOBSTRUCTIVE ATHEROSCLEROSIS OF CORONARY ARTERY: ICD-10-CM

## 2024-07-01 DIAGNOSIS — E78.5 HYPERLIPIDEMIA LDL GOAL <70: ICD-10-CM

## 2024-07-01 RX ORDER — ROSUVASTATIN CALCIUM 20 MG/1
20 TABLET, COATED ORAL DAILY
Qty: 90 TABLET | Refills: 0 | Status: SHIPPED | OUTPATIENT
Start: 2024-07-01 | End: 2024-09-16

## 2024-07-01 RX ORDER — METOPROLOL SUCCINATE 25 MG/1
25 TABLET, EXTENDED RELEASE ORAL DAILY
Qty: 90 TABLET | Refills: 0 | Status: SHIPPED | OUTPATIENT
Start: 2024-07-01 | End: 2024-09-16

## 2024-07-01 NOTE — TELEPHONE ENCOUNTER
[facility] Cardiology Refill Guideline reviewed.  Medication meets criteria for refill.    Jessica Licona RN

## 2024-07-01 NOTE — TELEPHONE ENCOUNTER
Last Office Visit: 08/02/23 Dr. Shen  Next Office Visit: 08/23/24 Dr. Hermelinda De La Cruz MA Cardiology   7/1/2024 3:49 PM

## 2024-08-16 ENCOUNTER — LAB (OUTPATIENT)
Dept: LAB | Facility: CLINIC | Age: 59
End: 2024-08-16
Payer: COMMERCIAL

## 2024-08-16 DIAGNOSIS — E78.5 HYPERLIPIDEMIA LDL GOAL <70: ICD-10-CM

## 2024-08-16 LAB
ALT SERPL W P-5'-P-CCNC: 21 U/L (ref 0–50)
CHOLEST SERPL-MCNC: 198 MG/DL
FASTING STATUS PATIENT QL REPORTED: YES
HDLC SERPL-MCNC: 77 MG/DL
LDLC SERPL CALC-MCNC: 101 MG/DL
NONHDLC SERPL-MCNC: 121 MG/DL
TRIGL SERPL-MCNC: 101 MG/DL

## 2024-08-16 PROCEDURE — 84460 ALANINE AMINO (ALT) (SGPT): CPT

## 2024-08-16 PROCEDURE — 36415 COLL VENOUS BLD VENIPUNCTURE: CPT

## 2024-08-16 PROCEDURE — 80061 LIPID PANEL: CPT

## 2024-08-18 ASSESSMENT — ASTHMA QUESTIONNAIRES
QUESTION_2 LAST FOUR WEEKS HOW OFTEN HAVE YOU HAD SHORTNESS OF BREATH: ONCE OR TWICE A WEEK
ACT_TOTALSCORE: 23
ACT_TOTALSCORE: 23
QUESTION_3 LAST FOUR WEEKS HOW OFTEN DID YOUR ASTHMA SYMPTOMS (WHEEZING, COUGHING, SHORTNESS OF BREATH, CHEST TIGHTNESS OR PAIN) WAKE YOU UP AT NIGHT OR EARLIER THAN USUAL IN THE MORNING: NOT AT ALL
QUESTION_4 LAST FOUR WEEKS HOW OFTEN HAVE YOU USED YOUR RESCUE INHALER OR NEBULIZER MEDICATION (SUCH AS ALBUTEROL): ONCE A WEEK OR LESS
QUESTION_5 LAST FOUR WEEKS HOW WOULD YOU RATE YOUR ASTHMA CONTROL: COMPLETELY CONTROLLED
QUESTION_1 LAST FOUR WEEKS HOW MUCH OF THE TIME DID YOUR ASTHMA KEEP YOU FROM GETTING AS MUCH DONE AT WORK, SCHOOL OR AT HOME: NONE OF THE TIME

## 2024-08-19 ENCOUNTER — OFFICE VISIT (OUTPATIENT)
Dept: FAMILY MEDICINE | Facility: CLINIC | Age: 59
End: 2024-08-19
Payer: COMMERCIAL

## 2024-08-19 VITALS
SYSTOLIC BLOOD PRESSURE: 120 MMHG | DIASTOLIC BLOOD PRESSURE: 78 MMHG | RESPIRATION RATE: 22 BRPM | WEIGHT: 172 LBS | TEMPERATURE: 98.4 F | HEIGHT: 64 IN | BODY MASS INDEX: 29.37 KG/M2 | OXYGEN SATURATION: 96 % | HEART RATE: 85 BPM

## 2024-08-19 DIAGNOSIS — S91.209A AVULSION OF TOENAIL, INITIAL ENCOUNTER: Primary | ICD-10-CM

## 2024-08-19 PROCEDURE — 11730 AVULSION NAIL PLATE SIMPLE 1: CPT | Mod: TA | Performed by: FAMILY MEDICINE

## 2024-08-19 ASSESSMENT — PAIN SCALES - GENERAL: PAINLEVEL: NO PAIN (0)

## 2024-08-19 NOTE — RESULT ENCOUNTER NOTE
LDL worsened from previous and no longer at goal; ALT WNL. Follow up with Dr Shen on 8/23/24. Per med list on rosuvastatin 20 mg daily

## 2024-08-19 NOTE — PROGRESS NOTES
"  Assessment & Plan     Avulsion of toenail, initial encounter  Nail removal after digital block   Dressing applied          BMI  Estimated body mass index is 29.52 kg/m  as calculated from the following:    Height as of this encounter: 1.626 m (5' 4\").    Weight as of this encounter: 78 kg (172 lb).             Subjective   Jessica is a 59 year old, presenting for the following health issues:  Toenail        8/19/2024     1:11 PM   Additional Questions   Roomed by Adelaida WELCH CMA   Accompanied by Self     - Removal of left great toenail after cracking 1/2 way across nail bed when she kicked metal bar under bed. New nail is growing underneath. No redness, swelling, pain or drainage.    History of Present Illness       Reason for visit:  Remove toenail  Symptom onset:  More than a month  Symptoms include:  Dead toenail falling off  Symptom intensity:  Mild  Symptom progression:  Staying the same  Had these symptoms before:  No    She eats 2-3 servings of fruits and vegetables daily.She consumes 2 sweetened beverage(s) daily.She exercises with enough effort to increase her heart rate 20 to 29 minutes per day.  She exercises with enough effort to increase her heart rate 5 days per week.   She is taking medications regularly.               Objective    Ht 1.626 m (5' 4\")   Wt 78 kg (172 lb)   LMP 08/15/2006   BMI 29.52 kg/m    Body mass index is 29.52 kg/m .  Physical Exam     GENERAL: alert and no distress  SKIN: left great toenail is partially avulsed with new nail growing in.  There is a flap of nail near the proximal nail bed that is catching on clothing/sheets/etc.         [unfilled]    SKIN: Nail Removal    Date/Time: 8/19/2024 1:26 PM    Performed by: Karol Yousif MD  Authorized by: Karol Yousif MD      UNIVERSAL PROTOCOL   Site Marked: NA  Prior Images Obtained and Reviewed:  NA  Required items: Required blood products, implants, devices and special equipment available    Patient identity confirmed:  " Verbally with patient  Patient was reevaluated immediately before administering moderate or deep sedation or anesthesia  Confirmation Checklist:  Patient's identity using two indicators  Time out: Immediately prior to the procedure a time out was called    Universal Protocol: the Joint Commission Universal Protocol was followed    Preparation: Patient was prepped and draped in usual sterile fashion  Location: left foot  Location details: left big toe      ANESTHESIA  digital block  Local anesthesia used: yes  Local Anesthetic: lidocaine 1% without epinephrine      SEDATION    Patient Sedated: No    Preparation: skin prepped with Betadine  Amount removed: 1/5  Side: lateral  Wedge excision of skin of nail fold: no  Nail bed sutured: no  Nail matrix removed: none  Removed nail replaced and anchored: yes  Dressing: antibiotic ointment and dressing applied      PROCEDURE  Describe Procedure: Patient with nail avulsion affecting all but the medial 1/5 of the nail.  After digital block was done, a clamp was used to elevate the nail and roll the remaining 1/5 of the nail out of the medial nail bed.      Dressing applied with bacitracin and bandaid.  Pressure dressing was not required.   Patient Tolerance:  Patient tolerated the procedure well with no immediate complications  Length of time physician/provider present for 1:1 monitoring during sedation: 0              Signed Electronically by: Karol Yousif MD

## 2024-08-23 ENCOUNTER — OFFICE VISIT (OUTPATIENT)
Dept: CARDIOLOGY | Facility: CLINIC | Age: 59
End: 2024-08-23
Payer: COMMERCIAL

## 2024-08-23 VITALS
RESPIRATION RATE: 12 BRPM | WEIGHT: 169.8 LBS | HEART RATE: 70 BPM | SYSTOLIC BLOOD PRESSURE: 128 MMHG | DIASTOLIC BLOOD PRESSURE: 87 MMHG | BODY MASS INDEX: 28.99 KG/M2 | OXYGEN SATURATION: 99 % | HEIGHT: 64 IN

## 2024-08-23 DIAGNOSIS — I25.10 NONOBSTRUCTIVE ATHEROSCLEROSIS OF CORONARY ARTERY: Primary | ICD-10-CM

## 2024-08-23 PROCEDURE — 99214 OFFICE O/P EST MOD 30 MIN: CPT | Performed by: INTERNAL MEDICINE

## 2024-08-23 RX ORDER — NITROGLYCERIN 0.4 MG/1
TABLET SUBLINGUAL
Qty: 30 TABLET | Refills: 1 | Status: SHIPPED | OUTPATIENT
Start: 2024-08-23

## 2024-08-23 ASSESSMENT — PAIN SCALES - GENERAL: PAINLEVEL: NO PAIN (0)

## 2024-08-23 NOTE — LETTER
8/23/2024    Karol Yousif MD  [address]  [facility] [address]    RE: Francesca KATI Miguel       Dear Colleague,     I had the pleasure of seeing Francesca KATI Rivera in the [facility].      Cardiology Clinic        Assessment & Plan    1.  Exertional shortness of breath with cardiology diagnostic testing as below.  Coronary CTA with nonobstructive disease of  2.  Stress myocardial perfusion study with probable breast attenuation artifact in the anterior apical segments  3.  Preserved LV systolic function with no valvular heart disease  4.  Elevated BMI  5.  Hyperlipidemia,   6.  Gastroesophageal reflux disease  7.  Asthma      Recommendations    1.  Exertional shortness of breath: Work-up has ensued including pulmonary function tests and echocardiogram along with Lexiscan.  Coronary CTA did not demonstrate any high-grade stenoses in the proximal vessels.  Patient underwent cardiac rehab with good result.  She states that her symptoms have mainly resolved with the exception of one episode when she was under a great deal of stress this past spring.  We will give a sublingual nitroglycerin prescription for her to be used in the future if they do recur.  Otherwise is very active and just returned [place] she was active with no symptoms.    2.  We will keep a close eye on her LDL on next year's test.  If it is not at goal we will consider readding Zetia which she had responded to in the past.    3.  Return to clinic in 1 years time    Marjorie Shen MD, MD        HPI:      Patient is a pleasant 59-year-old woman who presents to establish local cardiac care.  She has had a coronary calcium score in the past that was elevated at 111 and she had been placed on appropriate medical therapy at that time.  More recently since group home patient has reported of some shortness of breath although she has gained weight during this timeframe.  She reports of  activities bringing her symptoms on.  She feels deconditioned.  Periodically at random times she will feel a chest pressure sensation that lasts a few seconds and subsides on its own.  Occurs with no warning and no aggravating or precipitating factors.  No relieving factors and the symptoms subside on their own.  A work-up has ensued including an EKG which demonstrates normal sinus rhythm.  She had an echocardiogram demonstrating preserved LV systolic function with no valvular abnormalities.  She had a stress nuclear myocardial perfusion study which was a Nela scan.  EKG portion demonstrated 0.5 mm ST segment changes in V3 V4 and V5 which is nondiagnostic.  In addition of this she had breast attenuation artifact with a small area ischemia noted in the anterior and apical segments of the left ventricle.  Ejection fraction was preserved consistent with her prior studies.      Subsequently she had a coronary CTA as noted below.  Was placed on medical therapy and her symptoms have mainly resolved.        Coronary CTA      CORONARY CALCIUM SCORE: The total Agatston calcium score is 178, Left  main: 0, left anterior descendin,  circumflex: 0.343, right  coronary artery: 8.95. This places the patient in the 97th percentile  when compared to age and gender matched control group.     CORONARY ANGIOGRAPHY : 1. Technically difficult study. The proximal  portion of the distal left anterior descending artery is  intramyocardial and the remainder of the distal left anterior  descending artery was small in caliber, poorly opacified and difficult  to assess for stenosis.  2. Mild proximal left anterior descending artery disease  3. Trivial circumflex disease  4. Trivial right coronary artery disease.            MPI:        The nuclear stress test is probably abnormal.     There is a small area of ischemia in the anterior and apical segment(s) of the left ventricle. There is breast tissue attenuation in this area  reducing specificity of these findings.     Left ventricular function is normal.     The left ventricular ejection fraction at stress is 67%.     There is no prior study for comparison.    Echo 2022    The left ventricle is normal in structure, function and size.  The visual ejection fraction is 55-60%.  The right ventricle is normal in structure, function and size.  Right ventricle systolic pressure estimate normal  Doppler interrogation does not demonstrate signficant stenosis or  insufficiency involving cardiac vavles     Coronary Calcium Score 2020: 111    Primary Care Physician  Karol Yousif      Patient Active Problem List   Diagnosis     Mild intermittent asthma     Allergic rhinitis     Esophageal reflux     Asymptomatic postmenopausal status     Post-menopausal atrophic vaginitis     Lichen sclerosis et atrophicus     Seasonal Moderate persistent asthma     Age-related osteoporosis without current pathological fracture     Cervical high risk HPV (human papillomavirus) test positive     Hyperlipidemia LDL goal <100       Past Medical History  I have reviewed this patient's medical history and updated it with pertinent information if needed.   Past Medical History:   Diagnosis Date     Asthma      Cervical high risk HPV (human papillomavirus) test positive 06/07/2019    See problem list     Tobacco use disorder 12/29/2005       Past Surgical History  I have reviewed this patient's surgical history and updated it with pertinent information if needed.  Past Surgical History:   Procedure Laterality Date     COLONOSCOPY N/A 5/20/2016    Procedure: COLONOSCOPY;  Surgeon: Darell Mayes MD;  Location: [facility]     COLONOSCOPY N/A 11/3/2021    Procedure: COLONOSCOPY, FLEXIBLE, WITH LESION REMOVAL USING SNARE;  Surgeon: Chris Alicia DO;  Location: [facility]     TUBAL LIGATION         Prior to Admission Medications  Cannot display prior to admission medications because the patient has not been  admitted in this contact.     [unfilled]  [unfilled]  Allergies  Allergies   Allergen Reactions     Nka [No Known Allergies]        Social History   reports that she quit smoking about 14 years ago. Her smoking use included cigarettes. She started smoking about 42 years ago. She has a 28 pack-year smoking history. She has never used smokeless tobacco. She reports current alcohol use. She reports that she does not use drugs.    Family History  Family History   Problem Relation Age of Onset     Alcohol/Drug Father      Diabetes Father      Hypertension Father      Psychotic Disorder Father         bipolar     Asthma Father      Cancer - colorectal Father 60     Depression Father      Cancer Maternal Grandfather         lung     Arthritis Maternal Grandfather      Cancer Paternal Grandmother         stomach     Psychotic Disorder Paternal Grandmother         bipolar     Depression Paternal Grandmother      Arthritis Paternal Grandmother      Breast Cancer Maternal Grandmother      Cancer - colorectal Maternal Grandmother      Arthritis Maternal Grandmother      Depression Daughter      Lupus Mother      C.A.D. Paternal Grandfather      Cerebrovascular Disease Paternal Grandfather      Arthritis Paternal Grandfather      Depression Son        Review of Systems  The comprehensive 10 point Review of Systems is negative other than noted in the HPI or here.     Physical Exam  Vital Signs with Ranges     Wt Readings from Last 4 Encounters:   08/19/24 78 kg (172 lb)   10/16/23 83.5 kg (184 lb)   08/02/23 82.9 kg (182 lb 12.8 oz)   03/06/23 83.9 kg (185 lb)     [unfilled]      Vitals: LMP 08/15/2006     LMP 08/15/2006     GENERAL: Healthy, alert and no distress  EYES: Eyes grossly normal to inspection.  No discharge or erythema, or obvious scleral/conjunctival abnormalities.  RESP: No audible wheeze, cough, or visible cyanosis.  No visible retractions or increased work of breathing.    CV:  RRR  Lungs: CTA  B  Abdomen: + BS Soft, NT  Ext: no edema  SKIN: Visible skin clear. No significant rash, abnormal pigmentation or lesions.  NEURO: Cranial nerves grossly intact.  Mentation and speech appropriate for age.  PSYCH: Mentation appears normal, affect normal/bright, judgement and insight intact, normal speech and appearance well-groomed.      Thank you for allowing me to participate in the care of your patient.      Sincerely,     Marjorie Shen MD     [facility]  cc:   Marjorie Shen MD  [address] [address]

## 2024-08-23 NOTE — PROGRESS NOTES
Cardiology Clinic        Assessment & Plan     1.  Exertional shortness of breath with cardiology diagnostic testing as below.  Coronary CTA with nonobstructive disease of  2.  Stress myocardial perfusion study with probable breast attenuation artifact in the anterior apical segments  3.  Preserved LV systolic function with no valvular heart disease  4.  Elevated BMI  5.  Hyperlipidemia,   6.  Gastroesophageal reflux disease  7.  Asthma      Recommendations    1.  Exertional shortness of breath: Work-up has ensued including pulmonary function tests and echocardiogram along with Lexiscan.  Coronary CTA did not demonstrate any high-grade stenoses in the proximal vessels.  Patient underwent cardiac rehab with good result.  She states that her symptoms have mainly resolved with the exception of one episode when she was under a great deal of stress this past spring.  We will give a sublingual nitroglycerin prescription for her to be used in the future if they do recur.  Otherwise is very active and just returned [place] she was active with no symptoms.    2.  We will keep a close eye on her LDL on next year's test.  If it is not at goal we will consider readding Zetia which she had responded to in the past.    3.  Return to clinic in 1 years time    Marjorie Shen MD, MD        HPI:      Patient is a pleasant 59-year-old woman who presents to establish local cardiac care.  She has had a coronary calcium score in the past that was elevated at 111 and she had been placed on appropriate medical therapy at that time.  More recently since correction patient has reported of some shortness of breath although she has gained weight during this timeframe.  She reports of activities bringing her symptoms on.  She feels deconditioned.  Periodically at random times she will feel a chest pressure sensation that lasts a few seconds and subsides on its own.  Occurs with no warning and no  aggravating or precipitating factors.  No relieving factors and the symptoms subside on their own.  A work-up has ensued including an EKG which demonstrates normal sinus rhythm.  She had an echocardiogram demonstrating preserved LV systolic function with no valvular abnormalities.  She had a stress nuclear myocardial perfusion study which was a Nela scan.  EKG portion demonstrated 0.5 mm ST segment changes in V3 V4 and V5 which is nondiagnostic.  In addition of this she had breast attenuation artifact with a small area ischemia noted in the anterior and apical segments of the left ventricle.  Ejection fraction was preserved consistent with her prior studies.      Subsequently she had a coronary CTA as noted below.  Was placed on medical therapy and her symptoms have mainly resolved.        Coronary CTA      CORONARY CALCIUM SCORE: The total Agatston calcium score is 178, Left  main: 0, left anterior descendin,  circumflex: 0.343, right  coronary artery: 8.95. This places the patient in the 97th percentile  when compared to age and gender matched control group.     CORONARY ANGIOGRAPHY : 1. Technically difficult study. The proximal  portion of the distal left anterior descending artery is  intramyocardial and the remainder of the distal left anterior  descending artery was small in caliber, poorly opacified and difficult  to assess for stenosis.  2. Mild proximal left anterior descending artery disease  3. Trivial circumflex disease  4. Trivial right coronary artery disease.            MPI:       The nuclear stress test is probably abnormal.    There is a small area of ischemia in the anterior and apical segment(s) of the left ventricle. There is breast tissue attenuation in this area reducing specificity of these findings.    Left ventricular function is normal.    The left ventricular ejection fraction at stress is 67%.    There is no prior study for comparison.    Echo     The left ventricle is  normal in structure, function and size.  The visual ejection fraction is 55-60%.  The right ventricle is normal in structure, function and size.  Right ventricle systolic pressure estimate normal  Doppler interrogation does not demonstrate signficant stenosis or  insufficiency involving cardiac vavles     Coronary Calcium Score 2020: 111    Primary Care Physician   Karol Yousif      Patient Active Problem List   Diagnosis    Mild intermittent asthma    Allergic rhinitis    Esophageal reflux    Asymptomatic postmenopausal status    Post-menopausal atrophic vaginitis    Lichen sclerosis et atrophicus    Seasonal Moderate persistent asthma    Age-related osteoporosis without current pathological fracture    Cervical high risk HPV (human papillomavirus) test positive    Hyperlipidemia LDL goal <100       Past Medical History   I have reviewed this patient's medical history and updated it with pertinent information if needed.   Past Medical History:   Diagnosis Date    Asthma     Cervical high risk HPV (human papillomavirus) test positive 06/07/2019    See problem list    Tobacco use disorder 12/29/2005       Past Surgical History   I have reviewed this patient's surgical history and updated it with pertinent information if needed.  Past Surgical History:   Procedure Laterality Date    COLONOSCOPY N/A 5/20/2016    Procedure: COLONOSCOPY;  Surgeon: Darell Mayes MD;  Location: [facility]    COLONOSCOPY N/A 11/3/2021    Procedure: COLONOSCOPY, FLEXIBLE, WITH LESION REMOVAL USING SNARE;  Surgeon: Chris Alicia DO;  Location: [facility]    TUBAL LIGATION         Prior to Admission Medications   Cannot display prior to admission medications because the patient has not been admitted in this contact.     [unfilled]  [unfilled]  Allergies   Allergies   Allergen Reactions    Nka [No Known Allergies]        Social History    reports that she quit smoking about 14 years ago. Her smoking use included cigarettes.  She started smoking about 42 years ago. She has a 28 pack-year smoking history. She has never used smokeless tobacco. She reports current alcohol use. She reports that she does not use drugs.    Family History   Family History   Problem Relation Age of Onset    Alcohol/Drug Father     Diabetes Father     Hypertension Father     Psychotic Disorder Father         bipolar    Asthma Father     Cancer - colorectal Father 60    Depression Father     Cancer Maternal Grandfather         lung    Arthritis Maternal Grandfather     Cancer Paternal Grandmother         stomach    Psychotic Disorder Paternal Grandmother         bipolar    Depression Paternal Grandmother     Arthritis Paternal Grandmother     Breast Cancer Maternal Grandmother     Cancer - colorectal Maternal Grandmother     Arthritis Maternal Grandmother     Depression Daughter     Lupus Mother     C.A.D. Paternal Grandfather     Cerebrovascular Disease Paternal Grandfather     Arthritis Paternal Grandfather     Depression Son        Review of Systems   The comprehensive 10 point Review of Systems is negative other than noted in the HPI or here.     Physical Exam   Vital Signs with Ranges     Wt Readings from Last 4 Encounters:   08/19/24 78 kg (172 lb)   10/16/23 83.5 kg (184 lb)   08/02/23 82.9 kg (182 lb 12.8 oz)   03/06/23 83.9 kg (185 lb)     [unfilled]      Vitals: LMP 08/15/2006     LMP 08/15/2006     GENERAL: Healthy, alert and no distress  EYES: Eyes grossly normal to inspection.  No discharge or erythema, or obvious scleral/conjunctival abnormalities.  RESP: No audible wheeze, cough, or visible cyanosis.  No visible retractions or increased work of breathing.    CV:  RRR  Lungs: CTA B  Abdomen: + BS Soft, NT  Ext: no edema  SKIN: Visible skin clear. No significant rash, abnormal pigmentation or lesions.  NEURO: Cranial nerves grossly intact.  Mentation and speech appropriate for age.  PSYCH: Mentation appears normal, affect normal/bright, judgement  and insight intact, normal speech and appearance well-groomed.

## 2024-09-09 ENCOUNTER — IMMUNIZATION (OUTPATIENT)
Dept: FAMILY MEDICINE | Facility: CLINIC | Age: 59
End: 2024-09-09
Payer: COMMERCIAL

## 2024-09-09 PROCEDURE — 90471 IMMUNIZATION ADMIN: CPT

## 2024-09-09 PROCEDURE — 90673 RIV3 VACCINE NO PRESERV IM: CPT

## 2024-09-16 DIAGNOSIS — M81.0 AGE-RELATED OSTEOPOROSIS WITHOUT CURRENT PATHOLOGICAL FRACTURE: ICD-10-CM

## 2024-09-16 DIAGNOSIS — N95.2 POST-MENOPAUSAL ATROPHIC VAGINITIS: ICD-10-CM

## 2024-09-16 DIAGNOSIS — E78.5 HYPERLIPIDEMIA LDL GOAL <70: ICD-10-CM

## 2024-09-16 DIAGNOSIS — E78.5 HYPERLIPIDEMIA LDL GOAL <130: ICD-10-CM

## 2024-09-16 DIAGNOSIS — I25.10 NONOBSTRUCTIVE ATHEROSCLEROSIS OF CORONARY ARTERY: ICD-10-CM

## 2024-09-16 RX ORDER — ESTRADIOL 0.5 MG/1
TABLET ORAL
Qty: 24 TABLET | Refills: 0 | Status: SHIPPED | OUTPATIENT
Start: 2024-09-16

## 2024-09-16 RX ORDER — ROSUVASTATIN CALCIUM 20 MG/1
20 TABLET, COATED ORAL DAILY
Qty: 90 TABLET | Refills: 4 | Status: SHIPPED | OUTPATIENT
Start: 2024-09-16

## 2024-09-16 RX ORDER — OMEPRAZOLE 40 MG/1
40 CAPSULE, DELAYED RELEASE ORAL DAILY
Qty: 90 CAPSULE | Refills: 0 | Status: SHIPPED | OUTPATIENT
Start: 2024-09-16

## 2024-09-16 RX ORDER — METOPROLOL SUCCINATE 25 MG/1
25 TABLET, EXTENDED RELEASE ORAL DAILY
Qty: 90 TABLET | Refills: 3 | Status: SHIPPED | OUTPATIENT
Start: 2024-09-16

## 2024-09-16 RX ORDER — IBANDRONATE SODIUM 150 MG/1
TABLET, FILM COATED ORAL
Qty: 3 TABLET | Refills: 0 | Status: SHIPPED | OUTPATIENT
Start: 2024-09-16

## 2024-09-16 NOTE — TELEPHONE ENCOUNTER
Has appointment scheduled for 10/23/24.      Requested Prescriptions   Pending Prescriptions Disp Refills    omeprazole (PRILOSEC) 40 MG DR capsule [Pharmacy Med Name: OMEPRAZOLE CAP 40MG] 90 capsule 3     Sig: TAKE 1 CAPSULE DAILY       PPI Protocol Failed - 9/16/2024  7:12 AM        Failed - Medication indicated for associated diagnosis     The medication is prescribed for one or more of the following conditions:     Erosive esophagitis    Gastritis   Gastric hypersecretion   Gastric ulcer   Gastroesophageal reflux disease   Helicobacter pylori gastrointestinal tract infection   Ulcer of duodenum   Drug-induced peptic ulcer   Esophageal stricture   Gastrointestinal hemorrhage   Indigestion   Stress ulcer   Zollinger-Marti syndrome   Arreola s esophagus   Laryngopharyngeal reflux   Epigastric Pain   Morbid Obesity   Cough          Passed - Medication is active on med list        Passed - Recent (12 mo) or future (90 days) visit within the authorizing provider's specialty     The patient must have completed an in-person or virtual visit within the past 12 months or has a future visit scheduled within the next 90 days with the authorizing provider s specialty.  Urgent care and e-visits do not quality as an office visit for this protocol.          Passed - Patient is age 18 or older        Passed - No active pregnacy on record        Passed - No positive pregnancy test in past 12 months          IBANdronate (BONIVA) 150 MG tablet [Pharmacy Med Name: IBANDRONATE  TAB 150MG] 3 tablet 3     Sig: TAKE 1 TABLET EVERY 30 DAYS       Bisphosphonates Passed - 9/16/2024  7:12 AM        Passed - Dexa scan completed in the past 48-months     Please review last Dexa result.           Passed - Medication is active on med list        Passed - Medication indicated for associated diagnosis     The medication is prescribed for one or more of the following conditions:     Osteoporosis   Osteitis Deformans (Paget's  Disease)   Postmenopausal    Osteopenia   Arthroplasty   Crohn's Disease   Cystic Fibrosis   Fibrous Dysplasia of bone   Growth Hormone Deficiency   Hypercalcemia   Juvenile Osteoporosis   Hypogonadism          Passed - Recent (12 mo) or future (90 days) visit within the authorizing provider's specialty     The patient must have completed an in-person or virtual visit within the past 12 months or has a future visit scheduled within the next 90 days with the authorizing provider s specialty.  Urgent care and e-visits do not quality as an office visit for this protocol.          Passed - Most recent Creatinine Clearance in last 12 months >35        Passed - Patient is age 18 or older          estradiol (ESTRACE) 0.5 MG tablet [Pharmacy Med Name: ESTRADIOL TAB 0.5MG] 24 tablet 2     Sig: INSERT 1 TABLET VAGINALLY 2TIMES A WEEK.       Contraceptives Protocol Failed - 9/16/2024  7:12 AM        Failed - Medication indicated for associated diagnosis     Medication is associated with one or more of the following diagnoses:  Contraception  Acne  Dysmenorrhea  Menorrhagia  Amenorrhea  PCOS  Premenstrual Dysphoric Disorder  Irregular menses  Endometriosis          Passed - Patient is not a current smoker if age is 35 or older        Passed - Recent (12 mo) or future (30 days) visit within the authorizing provider's specialty     The patient must have completed an in-person or virtual visit within the past 12 months or has a future visit scheduled within the next 90 days with the authorizing provider s specialty.  Urgent care and e-visits do not quality as an office visit for this protocol.          Passed - Medication is active on med list        Passed - No active pregnancy on record        Passed - No positive pregnancy test in past 12 months       Hormone Replacement Therapy Passed - 9/16/2024  7:12 AM        Passed - Blood pressure under 140/90 in past 12 months     BP Readings from Last 3 Encounters:   08/23/24 128/87    08/19/24 120/78   10/16/23 130/74       No data recorded            Passed - Medication is active on med list        Passed - Recent (12 mo) or future (90 days) visit within the authorizing provider's specialty     The patient must have completed an in-person or virtual visit within the past 12 months or has a future visit scheduled within the next 90 days with the authorizing provider s specialty.  Urgent care and e-visits do not quality as an office visit for this protocol.          Passed - Medication indicated for associated diagnosis     The medication is prescribed for one or more of the following conditions:    Menopause   Vulva/Vaginal atrophy   Low Estrogen   Gender Dysphoria   Male to Female transgender          Passed - Patient is 18 years of age or older        Passed - No active pregnancy on record        Passed - No positive pregnancy test on record in past 12 months       Hormone Replacement Therapy (vaginal) Passed - 9/16/2024  7:12 AM        Passed - Medication is active on med list        Passed - Recent (12 mo) or future (90 days) visit within the authorizing provider's specialty     The patient must have completed an in-person or virtual visit within the past 12 months or has a future visit scheduled within the next 90 days with the authorizing provider s specialty.  Urgent care and e-visits do not quality as an office visit for this protocol.          Passed - Medication indicated for associated diagnosis     Medication is associated with one or more of the following diagnoses:     Menopause   Vulva/Vaginal atrophy   UTI prophylaxis          Passed - Patient is 18 years of age or older        Passed - No active pregnancy on record        Passed - No positive pregnancy test on record in past 12 months

## 2024-09-16 NOTE — TELEPHONE ENCOUNTER
Last Office Visit: 08/23/24 Dr. Shen  Next Office Visit: CHANTEL De La Cruz MA Cardiology   9/16/2024 1:07 PM

## 2024-09-16 NOTE — TELEPHONE ENCOUNTER
[facility] Refill Guideline reviewed.  Medication meets criteria for refill. Jamia Crowe RN Cardiology September 16, 2024, 1:17 PM

## 2024-09-19 ENCOUNTER — IMMUNIZATION (OUTPATIENT)
Dept: FAMILY MEDICINE | Facility: CLINIC | Age: 59
End: 2024-09-19
Payer: COMMERCIAL

## 2024-09-19 PROCEDURE — 90480 ADMN SARSCOV2 VAC 1/ONLY CMP: CPT

## 2024-09-19 PROCEDURE — 91320 SARSCV2 VAC 30MCG TRS-SUC IM: CPT

## 2024-09-30 ENCOUNTER — OFFICE VISIT (OUTPATIENT)
Dept: FAMILY MEDICINE | Facility: CLINIC | Age: 59
End: 2024-09-30
Payer: COMMERCIAL

## 2024-09-30 VITALS
HEIGHT: 64 IN | SYSTOLIC BLOOD PRESSURE: 124 MMHG | DIASTOLIC BLOOD PRESSURE: 70 MMHG | BODY MASS INDEX: 29.56 KG/M2 | TEMPERATURE: 99 F | HEART RATE: 85 BPM | OXYGEN SATURATION: 99 % | WEIGHT: 173.13 LBS

## 2024-09-30 DIAGNOSIS — Z12.31 VISIT FOR SCREENING MAMMOGRAM: ICD-10-CM

## 2024-09-30 DIAGNOSIS — T78.40XA ALLERGIC REACTION, INITIAL ENCOUNTER: ICD-10-CM

## 2024-09-30 DIAGNOSIS — S40.861A INSECT BITE OF RIGHT UPPER ARM, INITIAL ENCOUNTER: Primary | ICD-10-CM
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
## 2024-09-30 PROCEDURE — 99214 OFFICE O/P EST MOD 30 MIN: CPT | Performed by: FAMILY MEDICINE

## 2024-09-30 RX ORDER — CEPHALEXIN 500 MG/1
500 CAPSULE ORAL 3 TIMES DAILY
Qty: 21 CAPSULE | Refills: 0 | Status: SHIPPED | OUTPATIENT
Start: 2024-09-30 | End: 2024-09-30

## 2024-09-30 RX ORDER — PREDNISONE 20 MG/1
40 TABLET ORAL DAILY
Qty: 14 TABLET | Refills: 0 | Status: SHIPPED | OUTPATIENT
Start: 2024-09-30

## 2024-09-30 RX ORDER — PREDNISONE 20 MG/1
40 TABLET ORAL DAILY
Qty: 14 TABLET | Refills: 0 | Status: SHIPPED | OUTPATIENT
Start: 2024-09-30 | End: 2024-09-30

## 2024-09-30 RX ORDER — CEPHALEXIN 500 MG/1
500 CAPSULE ORAL 3 TIMES DAILY
Qty: 21 CAPSULE | Refills: 0 | Status: SHIPPED | OUTPATIENT
Start: 2024-09-30

## 2024-09-30 ASSESSMENT — PAIN SCALES - GENERAL: PAINLEVEL: MILD PAIN (2)

## 2024-09-30 NOTE — PROGRESS NOTES
"        Assessment & Plan   Allergic reaction, initial encounter  Insect bite of right upper arm, initial encounter  Seems like an allergic reaction but cannot completely r/o that cellulitis is starting given the rapid progression of the right arm warmth/redness.  There is not yet any pain or fever.  She will start with prednisone and then progress to cephalexin if needed.  She will also start some benadryl  Understands that if there is progression to symptomatic symptoms she should go to ER.  - predniSONE (DELTASONE) 20 MG tablet; Take 2 tablets (40 mg) by mouth daily.  - cephALEXin (KEFLEX) 500 MG capsule; Take 1 capsule (500 mg) by mouth 3 times daily.      Visit for screening mammogram    - MA Screening Bilateral w/ Luciano; Future          BMI  Estimated body mass index is 29.72 kg/m  as calculated from the following:    Height as of this encounter: 1.626 m (5' 4\").    Weight as of this encounter: 78.5 kg (173 lb 2 oz).             Bianca Lopze is a 59 year old, presenting for the following health issues:  Insect Bites        8/19/2024     1:11 PM   Additional Questions   Roomed by Adelaida WELCH CMA   Accompanied by Self     History of Present Illness       Reason for visit:  Swollen arm from bug bite  Symptoms include:  Swollen arm  Symptom intensity:  Mild  Symptom progression:  Worsening  Had these symptoms before:  No  What makes it better:  Ice pack   She is taking medications regularly.     - Swelling and redness on backside of right upper arm x1 day. Today she notes that swelling and redness has spread to under bra line, bilateral axilla and flanks. No known insect bites Areas are warm, tight and itching. No drainage or fevers. She notes that her right arm feels \"heavy\". She has used Cortizone cream with some improvement.            Review of Systems  Constitutional, HEENT, cardiovascular, pulmonary, gi and gu systems are negative, except as otherwise noted.      Objective    /70   Pulse 85   Temp 99 " " F (37.2  C) (Tympanic)   Ht 1.626 m (5' 4\")   Wt 78.5 kg (173 lb 2 oz)   LMP 08/15/2006   SpO2 99%   BMI 29.72 kg/m    Body mass index is 29.72 kg/m .  Physical Exam   GENERAL: alert and no distress  Skin: see photo:  Erythema and warmth of the right forearm/elbow.  Not painful on palpation.  She has scattered small areas (one pictured) on her chest, legs that are not classic for urticaria abut may be morphing over time.                       Signed Electronically by: Karol Yousif MD    "

## 2024-09-30 NOTE — PATIENT INSTRUCTIONS
"Health Maintenance reviewed and plan for update discussed.  Patient asked to schedule her pap smear  Patient asked to schedule her mammogram          When you are out of refills or the refills say \"zero\", it is time to schedule your next appointment in clinic!    Labs are released to you almost immediately and sometimes before I have had a chance to review them.  I review labs regularly and once they are all in, you will either be sent a letter with your results or if you are signed up for on-line services, you will be notified that results are available to you on [facility]. If there are serious findings, you typically will be called.    If you have any questions about your visit, your symptoms, your medication, your test results or it is not clear what your diagnosis or treatment plan is please contact me (via "[employer] or call the care team at [phone] and say \"Care Team\"          "

## 2024-10-04 ENCOUNTER — PATIENT OUTREACH (OUTPATIENT)
Dept: CARE COORDINATION | Facility: CLINIC | Age: 59
End: 2024-10-04
Payer: COMMERCIAL

## 2024-10-22 SDOH — HEALTH STABILITY: PHYSICAL HEALTH: ON AVERAGE, HOW MANY MINUTES DO YOU ENGAGE IN EXERCISE AT THIS LEVEL?: 30 MIN

## 2024-10-22 SDOH — HEALTH STABILITY: PHYSICAL HEALTH: ON AVERAGE, HOW MANY DAYS PER WEEK DO YOU ENGAGE IN MODERATE TO STRENUOUS EXERCISE (LIKE A BRISK WALK)?: 3 DAYS

## 2024-10-22 ASSESSMENT — ASTHMA QUESTIONNAIRES
QUESTION_5 LAST FOUR WEEKS HOW WOULD YOU RATE YOUR ASTHMA CONTROL: WELL CONTROLLED
QUESTION_4 LAST FOUR WEEKS HOW OFTEN HAVE YOU USED YOUR RESCUE INHALER OR NEBULIZER MEDICATION (SUCH AS ALBUTEROL): NOT AT ALL
ACT_TOTALSCORE: 23
QUESTION_1 LAST FOUR WEEKS HOW MUCH OF THE TIME DID YOUR ASTHMA KEEP YOU FROM GETTING AS MUCH DONE AT WORK, SCHOOL OR AT HOME: NONE OF THE TIME
QUESTION_2 LAST FOUR WEEKS HOW OFTEN HAVE YOU HAD SHORTNESS OF BREATH: ONCE OR TWICE A WEEK
QUESTION_3 LAST FOUR WEEKS HOW OFTEN DID YOUR ASTHMA SYMPTOMS (WHEEZING, COUGHING, SHORTNESS OF BREATH, CHEST TIGHTNESS OR PAIN) WAKE YOU UP AT NIGHT OR EARLIER THAN USUAL IN THE MORNING: NOT AT ALL
ACT_TOTALSCORE: 23

## 2024-10-22 ASSESSMENT — SOCIAL DETERMINANTS OF HEALTH (SDOH): HOW OFTEN DO YOU GET TOGETHER WITH FRIENDS OR RELATIVES?: TWICE A WEEK

## 2024-10-23 ENCOUNTER — OFFICE VISIT (OUTPATIENT)
Dept: FAMILY MEDICINE | Facility: CLINIC | Age: 59
End: 2024-10-23
Attending: FAMILY MEDICINE
Payer: COMMERCIAL

## 2024-10-23 VITALS
HEIGHT: 64 IN | DIASTOLIC BLOOD PRESSURE: 80 MMHG | SYSTOLIC BLOOD PRESSURE: 122 MMHG | TEMPERATURE: 99.3 F | RESPIRATION RATE: 16 BRPM | BODY MASS INDEX: 29.37 KG/M2 | HEART RATE: 85 BPM | OXYGEN SATURATION: 97 % | WEIGHT: 172 LBS

## 2024-10-23 DIAGNOSIS — J45.40 MODERATE PERSISTENT ASTHMA WITHOUT COMPLICATION: ICD-10-CM

## 2024-10-23 DIAGNOSIS — Z12.4 CERVICAL CANCER SCREENING: ICD-10-CM

## 2024-10-23 DIAGNOSIS — Z87.891 PERSONAL HISTORY OF TOBACCO USE: ICD-10-CM

## 2024-10-23 DIAGNOSIS — M81.0 AGE-RELATED OSTEOPOROSIS WITHOUT CURRENT PATHOLOGICAL FRACTURE: ICD-10-CM

## 2024-10-23 DIAGNOSIS — R87.810 CERVICAL HIGH RISK HPV (HUMAN PAPILLOMAVIRUS) TEST POSITIVE: ICD-10-CM

## 2024-10-23 DIAGNOSIS — N95.2 POST-MENOPAUSAL ATROPHIC VAGINITIS: ICD-10-CM

## 2024-10-23 DIAGNOSIS — Z00.00 ROUTINE GENERAL MEDICAL EXAMINATION AT A HEALTH CARE FACILITY: Primary | ICD-10-CM

## 2024-10-23 DIAGNOSIS — Z12.11 SCREEN FOR COLON CANCER: ICD-10-CM

## 2024-10-23 PROCEDURE — 99213 OFFICE O/P EST LOW 20 MIN: CPT | Mod: 25 | Performed by: FAMILY MEDICINE

## 2024-10-23 PROCEDURE — G0296 VISIT TO DETERM LDCT ELIG: HCPCS | Performed by: FAMILY MEDICINE

## 2024-10-23 PROCEDURE — 99396 PREV VISIT EST AGE 40-64: CPT | Mod: 25 | Performed by: FAMILY MEDICINE

## 2024-10-23 PROCEDURE — G0145 SCR C/V CYTO,THINLAYER,RESCR: HCPCS | Performed by: FAMILY MEDICINE

## 2024-10-23 PROCEDURE — 87624 HPV HI-RISK TYP POOLED RSLT: CPT | Performed by: FAMILY MEDICINE

## 2024-10-23 RX ORDER — IBANDRONATE SODIUM 150 MG/1
150 TABLET, FILM COATED ORAL
Qty: 3 TABLET | Refills: 3 | Status: CANCELLED | OUTPATIENT
Start: 2024-10-23

## 2024-10-23 RX ORDER — ESTRADIOL 0.5 MG/1
TABLET ORAL
Qty: 24 TABLET | Refills: 3 | Status: CANCELLED | OUTPATIENT
Start: 2024-10-23

## 2024-10-23 RX ORDER — FLUTICASONE PROPIONATE AND SALMETEROL 250; 50 UG/1; UG/1
1 POWDER RESPIRATORY (INHALATION) EVERY 12 HOURS
Qty: 180 EACH | Refills: 3 | Status: CANCELLED | OUTPATIENT
Start: 2024-10-23

## 2024-10-23 ASSESSMENT — PAIN SCALES - GENERAL: PAINLEVEL_OUTOF10: NO PAIN (0)

## 2024-10-23 NOTE — PROGRESS NOTES
Lung Cancer Screening Shared Decision Making Visit     Francesca Rivera, a 59 year old female, is eligible for lung cancer screening    History   Smoking Status     Former     Packs/day: 1.00     Years: 28.00     Types: Cigarettes     Quit date: 5/20/2010   Smokeless Tobacco     Never       I have discussed with patient the risks and benefits of screening for lung cancer with low-dose CT.     The risks include:    radiation exposure: one low dose chest CT has as much ionizing radiation as about 15 chest x-rays, or 6 months of background radiation living in [place]      false positives: most findings/nodules are NOT cancer, but some might still require additional diagnostic evaluation, including biopsy    over-diagnosis: some slow growing cancers that might never have been clinically significant will be detected and treated unnecessarily     The benefit of early detection of lung cancer is contingent upon adherence to annual screening or more frequent follow up if indicated.     Furthermore, to benefit from screening, Francesca must be willing and able to undergo diagnostic procedures, if indicated. Although no specific guide is available for determining severity of comorbidities, it is reasonable to withhold screening in patients who have greater mortality risk from other diseases.     We did discuss that the best way to prevent lung cancer is to not smoke.    Some patients may value a numeric estimation of lung cancer risk when evaluating if lung cancer screening is right for them, here is one calculator:    ShouldIScreen

## 2024-10-23 NOTE — NURSING NOTE
"Initial /80   Pulse 85   Temp 99.3  F (37.4  C) (Tympanic)   Resp 16   Ht 1.626 m (5' 4\")   Wt 78 kg (172 lb)   LMP 08/15/2006   SpO2 97%   BMI 29.52 kg/m   Estimated body mass index is 29.52 kg/m  as calculated from the following:    Height as of this encounter: 1.626 m (5' 4\").    Weight as of this encounter: 78 kg (172 lb). .    "

## 2024-10-23 NOTE — PROGRESS NOTES
"Preventive Care Visit  [facility]  Karol Yousif MD, Family Medicine  Oct 23, 2024      Assessment & Plan     Routine general medical examination at a health care facility     - Comprehensive metabolic panel (BMP + Alb, Alk Phos, ALT, AST, Total. Bili, TP); Future    Cervical cancer screening     - HPV and Gynecologic Cytology Panel - Recommended Age 30 - 65 Years    Screen for colon cancer   Tubular adenoma, due for colonoscopy  - Colonoscopy Screening  Referral; Future    Age-related osteoporosis without current pathological fracture  On Boniva x 4 years now, after 1 more year will need a drug holiday  - DEXA HIP/PELVIS/SPINE - Future; Future    Cervical high risk HPV (human papillomavirus) test positive      - HPV and Gynecologic Cytology Panel - Recommended Age 30 - 65 Years    Moderate persistent asthma without complication  Continue prn albuterol  Continue Advair twice daily     Post-menopausal atrophic vaginitis  Continue vaginal estrogen    Personal history of tobacco use  14 years since quitting    - Prof fee: Shared Decision Making for Lung Cancer Screening  - CT Chest Lung Cancer Scrn Low Dose wo; Future    Patient has been advised of split billing requirements and indicates understanding: Yes        BMI  Estimated body mass index is 29.52 kg/m  as calculated from the following:    Height as of this encounter: 1.626 m (5' 4\").    Weight as of this encounter: 78 kg (172 lb).       Counseling  Appropriate preventive services were addressed with this patient via screening, questionnaire, or discussion as appropriate for fall prevention, nutrition, physical activity, Tobacco-use cessation, social engagement, weight loss and cognition.  Checklist reviewing preventive services available has been given to the patient.  Reviewed patient's diet, addressing concerns and/or questions.   She is at risk for lack of exercise and has been provided with information to increase " physical activity for the benefit of her well-being.           Subjective   Jessica is a 59 year old, presenting for the following:  Physical        10/23/2024     1:43 PM   Additional Questions   Roomed by Martha LONGORIA CMA          HPI         Asthma Follow-Up    Was ACT completed today?  Yes        10/22/2024     4:12 PM   ACT Total Scores   ACT TOTAL SCORE (Goal Greater than or Equal to 20) 23   In the past 12 months, how many times did you visit the emergency room for your asthma without being admitted to the hospital? 0    In the past 12 months, how many times were you hospitalized overnight because of your asthma? 0        Patient-reported        How many days per week do you miss taking your asthma controller medication?  0  Please describe any recent triggers for your asthma: None  Have you had any Emergency Room Visits, Urgent Care Visits, or Hospital Admissions since your last office visit?  No  Health Care Directive  Patient has a Health Care Directive on file        10/22/2024   General Health   How would you rate your overall physical health? Good   Feel stress (tense, anxious, or unable to sleep) Not at all            10/22/2024   Nutrition   Three or more servings of calcium each day? (!) NO   Diet: Low salt    Low fat/cholesterol   How many servings of fruit and vegetables per day? (!) 2-3   How many sweetened beverages each day? 0-1       Multiple values from one day are sorted in reverse-chronological order         10/22/2024   Exercise   Days per week of moderate/strenous exercise 3 days   Average minutes spent exercising at this level 30 min            10/22/2024   Social Factors   Frequency of gathering with friends or relatives Twice a week   Worry food won't last until get money to buy more No   Food not last or not have enough money for food? No   Do you have housing? (Housing is defined as stable permanent housing and does not include staying ouside in a car, in a tent, in an abandoned building,  in an overnight shelter, or couch-surfing.) Yes   Are you worried about losing your housing? No   Lack of transportation? No   Unable to get utilities (heat,electricity)? No            10/22/2024   Fall Risk   Fallen 2 or more times in the past year? No    Trouble with walking or balance? No        Patient-reported          10/22/2024   Dental   Dentist two times every year? Yes            10/22/2024   TB Screening   Were you born outside of the [place]? No            Today's PHQ-2 Score:       10/22/2024     4:10 PM   PHQ-2 (  [employer])   Q1: Little interest or pleasure in doing things 0    Q2: Feeling down, depressed or hopeless 0    PHQ-2 Score 0   Q1: Little interest or pleasure in doing things Not at all   Q2: Feeling down, depressed or hopeless Not at all   PHQ-2 Score 0       Patient-reported           10/22/2024   Substance Use   Alcohol more than 3/day or more than 7/wk No   Do you use any other substances recreationally? No        Social History     Tobacco Use    Smoking status: Former     Current packs/day: 0.00     Average packs/day: 1 pack/day for 28.0 years (28.0 ttl pk-yrs)     Types: Cigarettes     Start date: 1982     Quit date: 2010     Years since quittin.4    Smokeless tobacco: Never   Substance Use Topics    Alcohol use: Yes     Comment: occ    Drug use: No           10/11/2023   LAST FHS-7 RESULTS   1st degree relative breast or ovarian cancer No   Any relative bilateral breast cancer No   Any male have breast cancer No   Any ONE woman have BOTH breast AND ovarian cancer No   Any woman with breast cancer before 50yrs No   2 or more relatives with breast AND/OR ovarian cancer No   2 or more relatives with breast AND/OR bowel cancer No           Mammogram Screening - Mammogram every 1-2 years updated in Health Maintenance based on mutual decision making        10/22/2024   STI Screening   New sexual partner(s) since last STI/HIV test? No        History of abnormal Pap smear: YES -  "reflected in Problem List and Health Maintenance accordingly        Latest Ref Rng & Units 10/8/2021     8:58 AM 9/2/2020     7:50 AM 9/2/2020     7:35 AM   PAP / HPV   PAP  Negative for Intraepithelial Lesion or Malignancy (NILM)      PAP (Historical)    NIL    HPV 16 DNA Negative Negative  Negative     HPV 18 DNA Negative Negative  Negative     Other HR HPV Negative Negative  Negative       ASCVD Risk   The 10-year ASCVD risk score (Alicia CARPIO, et al., 2019) is: 1.9%    Values used to calculate the score:      Age: 59 years      Sex: Female      Is Non- : No      Diabetic: No      Tobacco smoker: No      Systolic Blood Pressure: 122 mmHg      Is BP treated: No      HDL Cholesterol: 77 mg/dL      Total Cholesterol: 198 mg/dL           Reviewed and updated as needed this visit by Provider   Tobacco  Allergies  Meds  Problems  Med Hx  Surg Hx  Fam Hx                  Review of Systems  Constitutional, HEENT, cardiovascular, pulmonary, gi and gu systems are negative, except as otherwise noted.     Objective    Exam  /80   Pulse 85   Temp 99.3  F (37.4  C) (Tympanic)   Resp 16   Ht 1.626 m (5' 4\")   Wt 78 kg (172 lb)   LMP 08/15/2006   SpO2 97%   BMI 29.52 kg/m     Estimated body mass index is 29.52 kg/m  as calculated from the following:    Height as of this encounter: 1.626 m (5' 4\").    Weight as of this encounter: 78 kg (172 lb).    Physical Exam  GENERAL: alert and no distress  NECK: no adenopathy, no asymmetry, masses, or scars  RESP: lungs clear to auscultation - no rales, rhonchi or wheezes  BREAST: normal without masses, tenderness or nipple discharge and no palpable axillary masses or adenopathy  CV: regular rate and rhythm, normal S1 S2, no S3 or S4, no murmur, click or rub, no peripheral edema  ABDOMEN: soft, nontender, no hepatosplenomegaly, no masses and bowel sounds normal   (female): normal female external genitalia, normal urethral meatus , normal " vaginal mucosa, normal cervix, adnexae, and uterus without masses., and pap obtained  MS: no gross musculoskeletal defects noted, no edema        Signed Electronically by: Karol Yousif MD

## 2024-10-23 NOTE — PATIENT INSTRUCTIONS
Health Maintenance reviewed and plan for update discussed.  Patient asked to schedule her mammogram  Patient asked to schedule colonoscopy  DEXA/Bone Density Scan          Patient Education   Preventive Care Advice   This is general advice given by our system to help you stay healthy. However, your care team may have specific advice just for you. Please talk to your care team about your preventive care needs.  Nutrition  Eat 5 or more servings of fruits and vegetables each day.  Try wheat bread, brown rice and whole grain pasta (instead of white bread, rice, and pasta).  Get enough calcium and vitamin D. Check the label on foods and aim for 100% of the RDA (recommended daily allowance).  Lifestyle  Exercise at least 150 minutes each week  (30 minutes a day, 5 days a week).  Do muscle strengthening activities 2 days a week. These help control your weight and prevent disease.  No smoking.  Wear sunscreen to prevent skin cancer.  Have a dental exam and cleaning every 6 months.  Yearly exams  See your health care team every year to talk about:  Any changes in your health.  Any medicines your care team has prescribed.  Preventive care, family planning, and ways to prevent chronic diseases.  Shots (vaccines)   HPV shots (up to age 26), if you've never had them before.  Hepatitis B shots (up to age 59), if you've never had them before.  COVID-19 shot: Get this shot when it's due.  Flu shot: Get a flu shot every year.  Tetanus shot: Get a tetanus shot every 10 years.  Pneumococcal, hepatitis A, and RSV shots: Ask your care team if you need these based on your risk.  Shingles shot (for age 50 and up)  General health tests  Diabetes screening:  Starting at age 35, Get screened for diabetes at least every 3 years.  If you are younger than age 35, ask your care team if you should be screened for diabetes.  Cholesterol test: At age 39, start having a cholesterol test every 5 years, or more often if advised.  Bone density scan  (DEXA): At age 50, ask your care team if you should have this scan for osteoporosis (brittle bones).  Hepatitis C: Get tested at least once in your life.  STIs (sexually transmitted infections)  Before age 24: Ask your care team if you should be screened for STIs.  After age 24: Get screened for STIs if you're at risk. You are at risk for STIs (including HIV) if:  You are sexually active with more than one person.  You don't use condoms every time.  You or a partner was diagnosed with a sexually transmitted infection.  If you are at risk for HIV, ask about PrEP medicine to prevent HIV.  Get tested for HIV at least once in your life, whether you are at risk for HIV or not.  Cancer screening tests  Cervical cancer screening: If you have a cervix, begin getting regular cervical cancer screening tests starting at age 21.  Breast cancer scan (mammogram): If you've ever had breasts, begin having regular mammograms starting at age 40. This is a scan to check for breast cancer.  Colon cancer screening: It is important to start screening for colon cancer at age 45.  Have a colonoscopy test every 10 years (or more often if you're at risk) Or, ask your provider about stool tests like a FIT test every year or Cologuard test every 3 years.  To learn more about your testing options, visit:   .  For help making a decision, visit:   https://bit.ly/da84743.  Prostate cancer screening test: If you have a prostate, ask your care team if a prostate cancer screening test (PSA) at age 55 is right for you.  Lung cancer screening: If you are a current or former smoker ages 50 to 80, ask your care team if ongoing lung cancer screenings are right for you.  For informational purposes only. Not to replace the advice of your health care provider. Copyright   2023 [employer]. All rights reserved. Clinically reviewed by the [facility] Transitions Program. [employer] [id number] - REV 01/24.     Lung Cancer Screening   Frequently  Asked Questions  If you are at high-risk for lung cancer, getting screened with low-dose computed tomography (LDCT) every year can help save your life. This handout offers answers to some of the most common questions about lung cancer screening. If you have other questions, please call [phone] ([phone]).     What is it?  Lung cancer screening uses special X-ray technology to create an image of your lung tissue. The exam is quick and easy and takes less than 10 seconds. We don t give you any medicine or use any needles. You can eat before and after the exam. You don t need to change your clothes as long as the clothing on your chest doesn t contain metal. But, you do need to be able to hold your breath for at least 6 seconds during the exam.    What is the goal of lung cancer screening?  The goal of lung cancer screening is to save lives. Many times, lung cancer is not found until a person starts having physical symptoms. Lung cancer screening can help detect lung cancer in the earliest stages when it may be easier to treat.    Who should be screened for lung cancer?  We suggest lung cancer screening for anyone who is at high-risk for lung cancer. You are in the high-risk group if you:     are between the ages of 55 and 79, and   have smoked at least 1 pack of cigarettes a day for 20 or more years, and   still smoke or have quit within the past 15 years.    However, if you have a new cough or shortness of breath, you should talk to your doctor before being screened.    Why does it matter if I have symptoms?  Certain symptoms can be a sign that you have a condition in your lungs that should be checked and treated by your doctor. These symptoms include fever, chest pain, a new or changing cough, shortness of breath that you have never felt before, coughing up blood or unexplained weight loss. Having any of these symptoms can greatly affect the results of lung cancer screening.       Should all smokers  get an LDCT lung cancer screening exam?  It depends. Lung cancer screening is for a very specific group of men and women who have a history of heavy smoking over a long period of time (see  Who should be screened for lung cancer  above).  I am in the high-risk group, but have been diagnosed with cancer in the past. Is LDCT lung cancer screening right for me?  In some cases, you should not have LDCT lung screening, such as when your doctor is already following your cancer with CT scan studies. Your doctor will help you decide if LDCT lung screening is right for you.  Do I need to have a screening exam every year?  Yes. If you are in the high-risk group described earlier, you should get an LDCT lung cancer screening exam every year until you are 79, or are no longer willing or able to undergo screening and possible procedures to diagnose and treat lung cancer.  How effective is LDCT at preventing death from lung cancer?  Studies have shown that LDCT lung cancer screening can lower the risk of death from lung cancer by 20 percent in people who are at high-risk.  What are the risks?  There are some risks and limitations of LDCT lung cancer screening. We want to make sure you understand the risks and benefits, so please let us know if you have any questions. Your doctor may want to talk with you more about these risks.   Radiation exposure: As with any exam that uses radiation, there is a very small increased risk of cancer. The amount of radiation in LDCT is small--about the same amount a person would get from a mammogram. Your doctor orders the exam when he or she feels the potential benefits outweigh the risks.   False negatives: No test is perfect, including LDCT. It is possible that you may have a medical condition, including lung cancer, that is not found during your exam. This is called a false negative result.   False positives and more testing: LDCT very often finds something in the lung that could be cancer,  but in fact is not. This is called a false positive result. False positive tests often cause anxiety. To make sure these findings are not cancer, you may need to have more tests. These tests will be done only if you give us permission. Sometimes patients need a treatment that can have side effects, such as a biopsy. For more information on false positives, see  What can I expect from the results?    Findings not related to lung cancer: Your LDCT exam also takes pictures of areas of your body next to your lungs. In a very small number of cases, the CT scan will show an abnormal finding in one of these areas, such as your kidneys, adrenal glands, liver or thyroid. This finding may not be serious, but you may need more tests. Your doctor can help you decide what other tests you may need, if any.  What can I expect from the results?  About 1 out of 4 LDCT exams will find something that may need more tests. Most of the time, these findings are lung nodules. Lung nodules are very small collections of tissue in the lung. These nodules are very common, and the vast majority--more than 97 percent--are not cancer (benign). Most are normal lymph nodes or small areas of scarring from past infections.  But, if a small lung nodule is found to be cancer, the cancer can be cured more than 90 percent of the time. To know if the nodule is cancer, we may need to get more images before your next yearly screening exam. If the nodule has suspicious features (for example, it is large, has an odd shape or grows over time), we will refer you to a specialist for further testing.  Will my doctor also get the results?  Yes. Your doctor will get a copy of your results.  Is it okay to keep smoking now that there s a cancer screening exam?  No. Tobacco is one of the strongest cancer-causing agents. It causes not only lung cancer, but other cancers and cardiovascular (heart) diseases as well. The damage caused by smoking builds over time. This  means that the longer you smoke, the higher your risk of disease. While it is never too late to quit, the sooner you quit, the better.  Where can I find help to quit smoking?  The best way to prevent lung cancer is to stop smoking. If you have already quit smoking, congratulations and keep it up! For help on quitting smoking, please call [employer] at 5-657-QUITNOW (1-692.204.4836) or the American [employer] Society at 1-330.446.9237 to find local resources near you.  One-on-one health coaching:  If you d prefer to work individually with a health care provider on tobacco cessation, we offer:     Medication Therapy Management:  Our specially trained pharmacists work closely with you and your doctor to help you quit smoking.  Call [phone] or [phone] (toll free).

## 2024-10-24 ENCOUNTER — TELEPHONE (OUTPATIENT)
Dept: GASTROENTEROLOGY | Facility: CLINIC | Age: 59
End: 2024-10-24
Payer: COMMERCIAL

## 2024-10-24 LAB
HPV HR 12 DNA CVX QL NAA+PROBE: NEGATIVE
HPV16 DNA CVX QL NAA+PROBE: NEGATIVE
HPV18 DNA CVX QL NAA+PROBE: NEGATIVE
HUMAN PAPILLOMA VIRUS FINAL DIAGNOSIS: NORMAL

## 2024-10-24 NOTE — TELEPHONE ENCOUNTER
"Endoscopy Scheduling Screen      What insurance is in the chart?  Other:  [employer]    Ordering/Referring Provider: Karol Yousif   (If ordering provider performs procedure, schedule with ordering provider unless otherwise instructed. )    BMI: There is no height or weight on file to calculate BMI.  29.52    Sedation Ordered  general anesthesia.   BMI<= 45 45 < BMI <= 48 48 < BMI < = 50  BMI > 50   No Restrictions No [facility]  No [facility]  [facility] with exceptions Hospital Only OR Only       Do you have a history of malignant hyperthermia?  No    (Females) Are you currently pregnant?   No     Have you been diagnosed or told you have pulmonary hypertension?   No    Do you have any heart conditions?  No Angina ? No hospitalization and controlled    Do you have an LVAD?  No    Have you been told you have moderate to severe sleep apnea?  No.    Have you been told you have COPD, asthma, or any other lung disease?  Yes     What breathing problems do you have?  Asthma     Do you use home oxygen?  No    Have your breathing problems required an ED visit or hospitalization in the last year?  No.    Have you ever had or are you waiting for an organ transplant?  No. Continue scheduling, no site restrictions.    Have you had a stroke or transient ischemic attack (TIA aka \"mini stroke\" in the last 6 months?   No    Have you been diagnosed with or been told you have cirrhosis of the liver?   No.    Are you currently on dialysis?   No    Do you need assistance transferring?   No    BMI: There is no height or weight on file to calculate BMI.     Is patients BMI > 50?  No    BMI > 40?  No    Do you have a diagnosis of diabetes?  No    Do you take an injectable medication for weight loss or diabetes (excluding insulin)?  No    Do you take the medication Naltrexone?  No    Do you take blood thinners?  No     Prep   Are you currently on dialysis or do you have chronic kidney disease?  No    Do you have a diagnosis of cystic " fibrosis (CF)?  No    On a regular basis do you go 3 -5 days between bowel movements?  No    Preferred Pharmacy:  [facility]  No Pharmacies Listed    Final Scheduling Details     Procedure scheduled  Colonoscopy    Surgeon:  Melquiades     Date of procedure:  1-23-25     Location  [place] - Patient preference.    What is your communication preference for Instructions and/or Bowel Prep?   [facility]    Patient Reminders:    You will receive a call from a Nurse to review instructions and health history.  This assessment must be completed prior to your procedure.  Failure to complete the Nurse assessment may result in the procedure being cancelled.       On the day of your procedure, please designate an adult(s) who can drive you home stay with you for the next 24 hours. The medicines used in the exam will make you sleepy. You will not be able to drive.       You cannot take public transportation, ride share services, or non-medical taxi service without a responsible caregiver.  Medical transport services are allowed with the requirement that a responsible caregiver will receive you at your destination.  We require that drivers and caregivers are confirmed prior to your procedure.

## 2024-10-25 ENCOUNTER — LAB (OUTPATIENT)
Dept: LAB | Facility: CLINIC | Age: 59
End: 2024-10-25
Payer: COMMERCIAL

## 2024-10-25 DIAGNOSIS — Z00.00 ROUTINE GENERAL MEDICAL EXAMINATION AT A HEALTH CARE FACILITY: ICD-10-CM

## 2024-10-25 LAB
ALBUMIN SERPL BCG-MCNC: 4.2 G/DL (ref 3.5–5.2)
ALP SERPL-CCNC: 59 U/L (ref 40–150)
ALT SERPL W P-5'-P-CCNC: 30 U/L (ref 0–50)
ANION GAP SERPL CALCULATED.3IONS-SCNC: 11 MMOL/L (ref 7–15)
AST SERPL W P-5'-P-CCNC: 32 U/L (ref 0–45)
BILIRUB SERPL-MCNC: 0.3 MG/DL
BUN SERPL-MCNC: 13.1 MG/DL (ref 6–20)
CALCIUM SERPL-MCNC: 9.2 MG/DL (ref 8.8–10.4)
CHLORIDE SERPL-SCNC: 105 MMOL/L (ref 98–107)
CREAT SERPL-MCNC: 0.81 MG/DL (ref 0.51–0.95)
EGFRCR SERPLBLD CKD-EPI 2021: 84 ML/MIN/1.73M2
GLUCOSE SERPL-MCNC: 91 MG/DL (ref 70–99)
HCO3 SERPL-SCNC: 24 MMOL/L (ref 22–29)
POTASSIUM SERPL-SCNC: 4 MMOL/L (ref 3.4–5.3)
PROT SERPL-MCNC: 7.3 G/DL (ref 6.4–8.3)
SODIUM SERPL-SCNC: 140 MMOL/L (ref 135–145)

## 2024-10-25 PROCEDURE — 36415 COLL VENOUS BLD VENIPUNCTURE: CPT

## 2024-10-25 PROCEDURE — 80053 COMPREHEN METABOLIC PANEL: CPT

## 2024-10-28 NOTE — RESULT ENCOUNTER NOTE
Francesca,    These labs are normal or acceptable.    Please contact my office if you have questions.    Karol Yousif M.D.

## 2024-10-29 LAB
BKR AP ASSOCIATED HPV REPORT: NORMAL
BKR LAB AP GYN ADEQUACY: NORMAL
BKR LAB AP GYN INTERPRETATION: NORMAL
BKR LAB AP PREVIOUS ABNORMAL: NORMAL
PATH REPORT.COMMENTS IMP SPEC: NORMAL
PATH REPORT.COMMENTS IMP SPEC: NORMAL
PATH REPORT.RELEVANT HX SPEC: NORMAL

## 2024-10-30 ENCOUNTER — HOSPITAL ENCOUNTER (OUTPATIENT)
Dept: BONE DENSITY | Facility: CLINIC | Age: 59
Discharge: HOME | End: 2024-10-30
Attending: FAMILY MEDICINE | Admitting: FAMILY MEDICINE
Payer: COMMERCIAL

## 2024-10-30 DIAGNOSIS — M81.0 AGE-RELATED OSTEOPOROSIS WITHOUT CURRENT PATHOLOGICAL FRACTURE: ICD-10-CM

## 2024-10-30 PROCEDURE — 77080 DXA BONE DENSITY AXIAL: CPT

## 2024-11-04 ENCOUNTER — ANCILLARY PROCEDURE (OUTPATIENT)
Dept: MAMMOGRAPHY | Facility: CLINIC | Age: 59
End: 2024-11-04
Attending: FAMILY MEDICINE
Payer: COMMERCIAL

## 2024-11-04 DIAGNOSIS — Z12.31 VISIT FOR SCREENING MAMMOGRAM: ICD-10-CM

## 2024-11-04 PROCEDURE — 77063 BREAST TOMOSYNTHESIS BI: CPT | Mod: TC | Performed by: RADIOLOGY

## 2024-11-04 PROCEDURE — 77067 SCR MAMMO BI INCL CAD: CPT | Mod: TC | Performed by: RADIOLOGY

## 2024-11-06 ENCOUNTER — OFFICE VISIT (OUTPATIENT)
Dept: DERMATOLOGY | Facility: CLINIC | Age: 59
End: 2024-11-06
Payer: COMMERCIAL

## 2024-11-06 DIAGNOSIS — D23.9 DERMAL NEVUS: Primary | ICD-10-CM

## 2024-11-06 DIAGNOSIS — L82.1 SEBORRHEIC KERATOSES: ICD-10-CM

## 2024-11-06 DIAGNOSIS — D18.01 ANGIOMA OF SKIN: ICD-10-CM

## 2024-11-06 DIAGNOSIS — L81.4 LENTIGO: ICD-10-CM

## 2024-11-06 PROCEDURE — 99243 OFF/OP CNSLTJ NEW/EST LOW 30: CPT | Performed by: DERMATOLOGY

## 2024-11-06 NOTE — PROGRESS NOTES
Francesca Rivera is an extremely pleasant 59 year old year old female patient I was asked to see by Dr. Yousif for spots on skin.  Patient has no other skin complaints today.  Remainder of the HPI, Meds, PMH, Allergies, FH, and SH was reviewed in chart.      Past Medical History:   Diagnosis Date    Asthma     Asymptomatic postmenopausal status 12/04/2007    Problem list name updated by automated process. Provider to review      Cervical high risk HPV (human papillomavirus) test positive 06/07/2019    See problem list    Tobacco use disorder 12/29/2005       Past Surgical History:   Procedure Laterality Date    COLONOSCOPY N/A 5/20/2016    Procedure: COLONOSCOPY;  Surgeon: Darell Mayes MD;  Location: [facility]    COLONOSCOPY N/A 11/3/2021    Procedure: COLONOSCOPY, FLEXIBLE, WITH LESION REMOVAL USING SNARE;  Surgeon: Chris Alicia DO;  Location: [facility]    TUBAL LIGATION          Family History   Problem Relation Age of Onset    Alcohol/Drug Father     Diabetes Father     Hypertension Father     Psychotic Disorder Father         bipolar    Asthma Father     Cancer - colorectal Father 60    Depression Father     Cancer Maternal Grandfather         lung    Arthritis Maternal Grandfather     Cancer Paternal Grandmother         stomach    Psychotic Disorder Paternal Grandmother         bipolar    Depression Paternal Grandmother     Arthritis Paternal Grandmother     Breast Cancer Maternal Grandmother     Cancer - colorectal Maternal Grandmother     Arthritis Maternal Grandmother     Depression Daughter     Lupus Mother     C.A.D. Paternal Grandfather     Cerebrovascular Disease Paternal Grandfather     Arthritis Paternal Grandfather     Depression Son        Social History     Socioeconomic History    Marital status:      Spouse name: Not on file    Number of children: Not on file    Years of education: Not on file    Highest education level: Not on file   Occupational History    Not on file    Tobacco Use    Smoking status: Former     Current packs/day: 0.00     Average packs/day: 1 pack/day for 28.0 years (28.0 ttl pk-yrs)     Types: Cigarettes     Start date: 1982     Quit date: 2010     Years since quittin.4    Smokeless tobacco: Never   Substance and Sexual Activity    Alcohol use: Yes     Comment: occ    Drug use: No    Sexual activity: Yes     Partners: Male     Birth control/protection: Surgical   Other Topics Concern    Parent/sibling w/ CABG, MI or angioplasty before 65F 55M? No   Social History Narrative    Not on file     Social Drivers of Health     Financial Resource Strain: Low Risk  (10/22/2024)    Financial Resource Strain     Within the past 12 months, have you or your family members you live with been unable to get utilities (heat, electricity) when it was really needed?: No   Food Insecurity: Low Risk  (10/22/2024)    Food Insecurity     Within the past 12 months, did you worry that your food would run out before you got money to buy more?: No     Within the past 12 months, did the food you bought just not last and you didn t have money to get more?: No   Transportation Needs: Low Risk  (10/22/2024)    Transportation Needs     Within the past 12 months, has lack of transportation kept you from medical appointments, getting your medicines, non-medical meetings or appointments, work, or from getting things that you need?: No   Physical Activity: Insufficiently Active (10/22/2024)    Exercise Vital Sign     Days of Exercise per Week: 3 days     Minutes of Exercise per Session: 30 min   Stress: No Stress Concern Present (10/22/2024)    [employer] [place] [employer] - Occupational Stress Questionnaire     Feeling of Stress : Not at all   Social Connections: Unknown (10/22/2024)    Social Connection and Isolation Panel [NHANES]     Frequency of Communication with Friends and Family: Not on file     Frequency of Social Gatherings with Friends and Family: Twice a  week     Attends Yazidism Services: Not on file     Active Member of Clubs or Organizations: Not on file     Attends Club or Organization Meetings: Not on file     Marital Status: Not on file   Interpersonal Safety: Low Risk  (10/23/2024)    Interpersonal Safety     Do you feel physically and emotionally safe where you currently live?: Yes     Within the past 12 months, have you been hit, slapped, kicked or otherwise physically hurt by someone?: No     Within the past 12 months, have you been humiliated or emotionally abused in other ways by your partner or ex-partner?: No   Housing Stability: Low Risk  (10/22/2024)    Housing Stability     Do you have housing? : Yes     Are you worried about losing your housing?: No       Outpatient Encounter Medications as of 11/6/2024   Medication Sig Dispense Refill    acetaminophen (TYLENOL) 500 MG tablet Take 500-1,000 mg by mouth every 6 hours as needed for mild pain      ADVIL 200 MG OR CAPS prn      albuterol (VENTOLIN HFA) 108 (90 Base) MCG/ACT inhaler Inhale 2 puffs into the lungs every 4 hours as needed for shortness of breath or wheezing FILL ALBUTEROL BRAND/GENERIC AS COVERED 36 g 3    aspirin (ASA) 81 MG EC tablet Take 1 tablet (81 mg) by mouth daily      CITRACAL + D 250-62.5 MG-UNIT OR TABS   0    clobetasol (TEMOVATE) 0.05 % external ointment Apply  topically. Apply small amount twice weekly as needed for maintenance. May use daily for 1-2 weeks for exacerbation. 45 g 3    estradiol (ESTRACE) 0.5 MG tablet INSERT 1 TABLET VAGINALLY 2TIMES A WEEK. 24 tablet 0    FLAXSEED OIL OR 1tab twice daily      fluticasone (FLONASE) 50 MCG/ACT nasal spray Spray 2 sprays into both nostrils daily 48 g 4    fluticasone-salmeterol (ADVAIR) 250-50 MCG/ACT inhaler Inhale 1 puff into the lungs every 12 hours 180 each 3    IBANdronate (BONIVA) 150 MG tablet TAKE 1 TABLET EVERY 30 DAYS 3 tablet 0    metoprolol succinate ER (TOPROL XL) 25 MG 24 hr tablet Take 1 tablet (25 mg) by mouth  daily. 90 tablet 3    Milk Thistle-Dand-Fennel-Licor (MILK THISTLE XTRA) CAPS capsule       multivitamin w/minerals (THERA-VIT-M) tablet Take 1 tablet by mouth daily      nitroGLYcerin (NITROSTAT) 0.4 MG sublingual tablet For chest pain place 1 tablet under the tongue every 5 minutes for 3 doses. If symptoms persist 5 minutes after 1st dose call 911. 30 tablet 1    omeprazole (PRILOSEC) 40 MG DR capsule TAKE 1 CAPSULE DAILY 90 capsule 0    predniSONE (DELTASONE) 20 MG tablet Take 2 tablets (40 mg) by mouth daily. 14 tablet 0    rosuvastatin (CRESTOR) 20 MG tablet TAKE 1 TABLET DAILY 90 tablet 4    scopolamine (TRANSDERM) 1 MG/3DAYS 72 hr patch Place 1 patch onto the skin every 72 hours 4 patch 0     No facility-administered encounter medications on file as of 11/6/2024.             O:   NAD, WDWN, Alert & Oriented, Mood & Affect wnl, Vitals stable   General appearance normal   Vitals stable   Alert, oriented and in no acute distress        Stuck on papules and brown macules on trunk and ext   Red papules on trunk  Flesh colored papules on trunk     The remainder of the full exam was normal; the following areas were examined:  conjunctiva/lids, , neck, peripheral vascular system, abdomen, lymph nodes, digits/nails, eccrine and apocrine glands, scalp/hair, face, neck, chest, abdomen, buttocks, back, RUE, LUE, RLE, LLE       Eyes: Conjunctivae/lids:Normal     ENT: Lips, mucosa: normal    MSK:Normal    Cardiovascular: peripheral edema none    Pulm: Breathing Normal    Lymph Nodes: No Head and Neck Lymphadenopathy     Neuro/Psych: Orientation:Alert and Orientedx3 ; Mood/Affect:normal       A/P:  1. Seborrheic keratosis, lentigo, angioma, dermal nevus  It was a pleasure speaking to Francesca Rivera today.  Previous clinic notes and pertinent laboratory tests were reviewed prior to Francesca Rivera's visit.  Nature and genetics of benign skin lesions dicussed with patient.  Signs and Symptoms of skin cancer discussed  with patient.  Patient encouraged to perform monthly skin exams.  UV precautions reviewed with patient.  Return to clinic 12 months

## 2024-11-06 NOTE — LETTER
11/6/2024      Francesca Rivera  [address]  [place] [address]      Dear Colleague,    Thank you for referring your patient, Francesca Rivera, to the [facility]. Please see a copy of my visit note below.    Francesca Rivera is an extremely pleasant 59 year old year old female patient I was asked to see by Dr. Yousif for spots on skin.  Patient has no other skin complaints today.  Remainder of the HPI, Meds, PMH, Allergies, FH, and SH was reviewed in chart.      Past Medical History:   Diagnosis Date     Asthma      Asymptomatic postmenopausal status 12/04/2007    Problem list name updated by automated process. Provider to review       Cervical high risk HPV (human papillomavirus) test positive 06/07/2019    See problem list     Tobacco use disorder 12/29/2005       Past Surgical History:   Procedure Laterality Date     COLONOSCOPY N/A 5/20/2016    Procedure: COLONOSCOPY;  Surgeon: Darell Mayes MD;  Location: [facility]     COLONOSCOPY N/A 11/3/2021    Procedure: COLONOSCOPY, FLEXIBLE, WITH LESION REMOVAL USING SNARE;  Surgeon: Chris Alicia DO;  Location: [facility]     TUBAL LIGATION          Family History   Problem Relation Age of Onset     Alcohol/Drug Father      Diabetes Father      Hypertension Father      Psychotic Disorder Father         bipolar     Asthma Father      Cancer - colorectal Father 60     Depression Father      Cancer Maternal Grandfather         lung     Arthritis Maternal Grandfather      Cancer Paternal Grandmother         stomach     Psychotic Disorder Paternal Grandmother         bipolar     Depression Paternal Grandmother      Arthritis Paternal Grandmother      Breast Cancer Maternal Grandmother      Cancer - colorectal Maternal Grandmother      Arthritis Maternal Grandmother      Depression Daughter      Lupus Mother      C.A.D. Paternal Grandfather      Cerebrovascular Disease Paternal Grandfather      Arthritis Paternal Grandfather       Depression Son        Social History     Socioeconomic History     Marital status:      Spouse name: Not on file     Number of children: Not on file     Years of education: Not on file     Highest education level: Not on file   Occupational History     Not on file   Tobacco Use     Smoking status: Former     Current packs/day: 0.00     Average packs/day: 1 pack/day for 28.0 years (28.0 ttl pk-yrs)     Types: Cigarettes     Start date: 1982     Quit date: 2010     Years since quittin.4     Smokeless tobacco: Never   Substance and Sexual Activity     Alcohol use: Yes     Comment: occ     Drug use: No     Sexual activity: Yes     Partners: Male     Birth control/protection: Surgical   Other Topics Concern     Parent/sibling w/ CABG, MI or angioplasty before 65F 55M? No   Social History Narrative     Not on file     Social Drivers of Health     Financial Resource Strain: Low Risk  (10/22/2024)    Financial Resource Strain      Within the past 12 months, have you or your family members you live with been unable to get utilities (heat, electricity) when it was really needed?: No   Food Insecurity: Low Risk  (10/22/2024)    Food Insecurity      Within the past 12 months, did you worry that your food would run out before you got money to buy more?: No      Within the past 12 months, did the food you bought just not last and you didn t have money to get more?: No   Transportation Needs: Low Risk  (10/22/2024)    Transportation Needs      Within the past 12 months, has lack of transportation kept you from medical appointments, getting your medicines, non-medical meetings or appointments, work, or from getting things that you need?: No   Physical Activity: Insufficiently Active (10/22/2024)    Exercise Vital Sign      Days of Exercise per Week: 3 days      Minutes of Exercise per Session: 30 min   Stress: No Stress Concern Present (10/22/2024)    [employer] [place] [employer] -  Occupational Stress Questionnaire      Feeling of Stress : Not at all   Social Connections: Unknown (10/22/2024)    Social Connection and Isolation Panel [NHANES]      Frequency of Communication with Friends and Family: Not on file      Frequency of Social Gatherings with Friends and Family: Twice a week      Attends Confucianist Services: Not on file      Active Member of Clubs or Organizations: Not on file      Attends Club or Organization Meetings: Not on file      Marital Status: Not on file   Interpersonal Safety: Low Risk  (10/23/2024)    Interpersonal Safety      Do you feel physically and emotionally safe where you currently live?: Yes      Within the past 12 months, have you been hit, slapped, kicked or otherwise physically hurt by someone?: No      Within the past 12 months, have you been humiliated or emotionally abused in other ways by your partner or ex-partner?: No   Housing Stability: Low Risk  (10/22/2024)    Housing Stability      Do you have housing? : Yes      Are you worried about losing your housing?: No       Outpatient Encounter Medications as of 11/6/2024   Medication Sig Dispense Refill     acetaminophen (TYLENOL) 500 MG tablet Take 500-1,000 mg by mouth every 6 hours as needed for mild pain       ADVIL 200 MG OR CAPS prn       albuterol (VENTOLIN HFA) 108 (90 Base) MCG/ACT inhaler Inhale 2 puffs into the lungs every 4 hours as needed for shortness of breath or wheezing FILL ALBUTEROL BRAND/GENERIC AS COVERED 36 g 3     aspirin (ASA) 81 MG EC tablet Take 1 tablet (81 mg) by mouth daily       CITRACAL + D 250-62.5 MG-UNIT OR TABS   0     clobetasol (TEMOVATE) 0.05 % external ointment Apply  topically. Apply small amount twice weekly as needed for maintenance. May use daily for 1-2 weeks for exacerbation. 45 g 3     estradiol (ESTRACE) 0.5 MG tablet INSERT 1 TABLET VAGINALLY 2TIMES A WEEK. 24 tablet 0     FLAXSEED OIL OR 1tab twice daily       fluticasone (FLONASE) 50 MCG/ACT nasal spray Spray 2  sprays into both nostrils daily 48 g 4     fluticasone-salmeterol (ADVAIR) 250-50 MCG/ACT inhaler Inhale 1 puff into the lungs every 12 hours 180 each 3     IBANdronate (BONIVA) 150 MG tablet TAKE 1 TABLET EVERY 30 DAYS 3 tablet 0     metoprolol succinate ER (TOPROL XL) 25 MG 24 hr tablet Take 1 tablet (25 mg) by mouth daily. 90 tablet 3     Milk Thistle-Dand-Fennel-Licor (MILK THISTLE XTRA) CAPS capsule        multivitamin w/minerals (THERA-VIT-M) tablet Take 1 tablet by mouth daily       nitroGLYcerin (NITROSTAT) 0.4 MG sublingual tablet For chest pain place 1 tablet under the tongue every 5 minutes for 3 doses. If symptoms persist 5 minutes after 1st dose call 911. 30 tablet 1     omeprazole (PRILOSEC) 40 MG DR capsule TAKE 1 CAPSULE DAILY 90 capsule 0     predniSONE (DELTASONE) 20 MG tablet Take 2 tablets (40 mg) by mouth daily. 14 tablet 0     rosuvastatin (CRESTOR) 20 MG tablet TAKE 1 TABLET DAILY 90 tablet 4     scopolamine (TRANSDERM) 1 MG/3DAYS 72 hr patch Place 1 patch onto the skin every 72 hours 4 patch 0     No facility-administered encounter medications on file as of 11/6/2024.             O:   NAD, WDWN, Alert & Oriented, Mood & Affect wnl, Vitals stable   General appearance normal   Vitals stable   Alert, oriented and in no acute distress        Stuck on papules and brown macules on trunk and ext   Red papules on trunk  Flesh colored papules on trunk     The remainder of the full exam was normal; the following areas were examined:  conjunctiva/lids, , neck, peripheral vascular system, abdomen, lymph nodes, digits/nails, eccrine and apocrine glands, scalp/hair, face, neck, chest, abdomen, buttocks, back, RUE, LUE, RLE, LLE       Eyes: Conjunctivae/lids:Normal     ENT: Lips, mucosa: normal    MSK:Normal    Cardiovascular: peripheral edema none    Pulm: Breathing Normal    Lymph Nodes: No Head and Neck Lymphadenopathy     Neuro/Psych: Orientation:Alert and Orientedx3 ; Mood/Affect:normal       A/P:  1.  Seborrheic keratosis, lentigo, angioma, dermal nevus  It was a pleasure speaking to Francesca Rivera today.  Previous clinic notes and pertinent laboratory tests were reviewed prior to Francesca Rivera's visit.  Nature and genetics of benign skin lesions dicussed with patient.  Signs and Symptoms of skin cancer discussed with patient.  Patient encouraged to perform monthly skin exams.  UV precautions reviewed with patient.  Return to clinic 12 months      Again, thank you for allowing me to participate in the care of your patient.        Sincerely,        Ivan Sharma MD

## 2024-11-19 ENCOUNTER — HOSPITAL ENCOUNTER (OUTPATIENT)
Dept: CT IMAGING | Facility: CLINIC | Age: 59
Discharge: HOME | End: 2024-11-19
Attending: FAMILY MEDICINE
Payer: COMMERCIAL

## 2024-11-19 DIAGNOSIS — Z87.891 PERSONAL HISTORY OF TOBACCO USE: ICD-10-CM

## 2024-11-19 PROCEDURE — 71271 CT THORAX LUNG CANCER SCR C-: CPT

## 2024-12-16 DIAGNOSIS — E78.5 HYPERLIPIDEMIA LDL GOAL <130: ICD-10-CM

## 2024-12-16 RX ORDER — OMEPRAZOLE 40 MG/1
40 CAPSULE, DELAYED RELEASE ORAL DAILY
Qty: 90 CAPSULE | Refills: 2 | Status: SHIPPED | OUTPATIENT
Start: 2024-12-16

## 2025-01-14 ENCOUNTER — ANESTHESIA EVENT (OUTPATIENT)
Dept: GASTROENTEROLOGY | Facility: CLINIC | Age: 60
End: 2025-01-14
Payer: COMMERCIAL

## 2025-01-14 ASSESSMENT — LIFESTYLE VARIABLES: TOBACCO_USE: 1

## 2025-01-14 NOTE — ANESTHESIA PREPROCEDURE EVALUATION
Anesthesia Pre-Procedure Evaluation    Patient: Francesca Rivera   MRN: [id number] : 1965        Procedure : Procedure(s):  Colonoscopy          Past Medical History:   Diagnosis Date    Asthma     Asymptomatic postmenopausal status 2007    Problem list name updated by automated process. Provider to review      Cervical high risk HPV (human papillomavirus) test positive 2019    See problem list    Tobacco use disorder 2005      Past Surgical History:   Procedure Laterality Date    COLONOSCOPY N/A 2016    Procedure: COLONOSCOPY;  Surgeon: Darell Mayes MD;  Location: [facility]    COLONOSCOPY N/A 11/3/2021    Procedure: COLONOSCOPY, FLEXIBLE, WITH LESION REMOVAL USING SNARE;  Surgeon: Chris Alicia DO;  Location: [facility]    TUBAL LIGATION        Allergies   Allergen Reactions    Nka [No Known Allergies]       Social History     Tobacco Use    Smoking status: Former     Current packs/day: 0.00     Average packs/day: 1 pack/day for 28.0 years (28.0 ttl pk-yrs)     Types: Cigarettes     Start date: 1982     Quit date: 2010     Years since quittin.6    Smokeless tobacco: Never   Substance Use Topics    Alcohol use: Yes     Comment: occ      Wt Readings from Last 1 Encounters:   10/23/24 78 kg (172 lb)        Anesthesia Evaluation   Pt has had prior anesthetic. Type: General and MAC.        ROS/MED HX  ENT/Pulmonary:     (+)           allergic rhinitis,     tobacco use, Past use,    Intermittent, asthma                  Neurologic:  - neg neurologic ROS     Cardiovascular:     (+) Dyslipidemia - -   -  - -                                      METS/Exercise Tolerance:     Hematologic:  - neg hematologic  ROS     Musculoskeletal:  - neg musculoskeletal ROS     GI/Hepatic:     (+) GERD, Asymptomatic on medication,      bowel prep,            Renal/Genitourinary:  - neg Renal ROS     Endo:  - neg endo ROS     Psychiatric/Substance Use:  - neg psychiatric ROS    "  Infectious Disease:  - neg infectious disease ROS     Malignancy:  - neg malignancy ROS     Other:  - neg other ROS          Physical Exam    Airway  airway exam normal      Mallampati: II   TM distance: > 3 FB   Neck ROM: full   Mouth opening: > 3 cm    Respiratory Devices and Support         Dental       (+) Completely normal teeth      Cardiovascular   cardiovascular exam normal       Rhythm and rate: regular and normal     Pulmonary   pulmonary exam normal        breath sounds clear to auscultation           OUTSIDE LABS:  CBC:   Lab Results   Component Value Date    WBC 6.3 10/06/2022    WBC 6.9 07/22/2020    HGB 12.9 10/06/2022    HGB 12.8 07/22/2020    HCT 40.1 10/06/2022    HCT 39.7 07/22/2020     10/06/2022     07/22/2020     BMP:   Lab Results   Component Value Date     10/25/2024     10/16/2023    POTASSIUM 4.0 10/25/2024    POTASSIUM 4.0 10/16/2023    CHLORIDE 105 10/25/2024    CHLORIDE 101 10/16/2023    CO2 24 10/25/2024    CO2 25 10/16/2023    BUN 13.1 10/25/2024    BUN 10.1 10/16/2023    CR 0.81 10/25/2024    CR 0.86 10/16/2023    GLC 91 10/25/2024    GLC 93 10/16/2023     COAGS: No results found for: \"PTT\", \"INR\", \"FIBR\"  POC: No results found for: \"BGM\", \"HCG\", \"HCGS\"  HEPATIC:   Lab Results   Component Value Date    ALBUMIN 4.2 10/25/2024    PROTTOTAL 7.3 10/25/2024    ALT 30 10/25/2024    AST 32 10/25/2024    ALKPHOS 59 10/25/2024    BILITOTAL 0.3 10/25/2024     OTHER:   Lab Results   Component Value Date    A1C 5.4 10/06/2022    JACKELINE 9.2 10/25/2024    TSH 2.45 10/06/2022       Anesthesia Plan    ASA Status:  2    NPO Status:  NPO Appropriate    Anesthesia Type: General.     - Airway: Native airway   Induction: Propofol.           Consents    Anesthesia Plan(s) and associated risks, benefits, and realistic alternatives discussed. Questions answered and patient/representative(s) expressed understanding.     - Discussed: Risks, Benefits and Alternatives for BOTH SEDATION " and the PROCEDURE were discussed     - Discussed with:  Patient      - Extended Intubation/Ventilatory Support Discussed: No.      - Patient is DNR/DNI Status: No     Use of blood products discussed: No .     Postoperative Care    Pain management: IV analgesics.   PONV prophylaxis: Background Propofol Infusion     Comments:               JAZIEL Muñoz CRNA    I have reviewed the pertinent notes and labs in the chart from the past 30 days and (re)examined the patient.  Any updates or changes from those notes are reflected in this note.                             # Asthma: noted on problem list

## 2025-01-23 ENCOUNTER — PATIENT OUTREACH (OUTPATIENT)
Dept: GASTROENTEROLOGY | Facility: CLINIC | Age: 60
End: 2025-01-23
Payer: COMMERCIAL

## 2025-01-23 ENCOUNTER — ANESTHESIA (OUTPATIENT)
Dept: GASTROENTEROLOGY | Facility: CLINIC | Age: 60
End: 2025-01-23
Payer: COMMERCIAL

## 2025-01-23 ENCOUNTER — HOSPITAL ENCOUNTER (OUTPATIENT)
Facility: CLINIC | Age: 60
Discharge: HOME | End: 2025-01-23
Attending: STUDENT IN AN ORGANIZED HEALTH CARE EDUCATION/TRAINING PROGRAM | Admitting: STUDENT IN AN ORGANIZED HEALTH CARE EDUCATION/TRAINING PROGRAM
Payer: COMMERCIAL

## 2025-01-23 VITALS
TEMPERATURE: 98.1 F | WEIGHT: 172 LBS | OXYGEN SATURATION: 95 % | SYSTOLIC BLOOD PRESSURE: 110 MMHG | RESPIRATION RATE: 16 BRPM | DIASTOLIC BLOOD PRESSURE: 78 MMHG | BODY MASS INDEX: 29.37 KG/M2 | HEART RATE: 93 BPM | HEIGHT: 64 IN

## 2025-01-23 PROBLEM — D12.6 ADENOMATOUS POLYP OF COLON: Status: ACTIVE | Noted: 2025-01-23

## 2025-01-23 LAB — COLONOSCOPY: NORMAL

## 2025-01-23 PROCEDURE — 370N000017 HC ANESTHESIA TECHNICAL FEE, PER MIN: Performed by: STUDENT IN AN ORGANIZED HEALTH CARE EDUCATION/TRAINING PROGRAM

## 2025-01-23 PROCEDURE — 45378 DIAGNOSTIC COLONOSCOPY: CPT | Performed by: STUDENT IN AN ORGANIZED HEALTH CARE EDUCATION/TRAINING PROGRAM

## 2025-01-23 PROCEDURE — G0105 COLORECTAL SCRN; HI RISK IND: HCPCS | Performed by: STUDENT IN AN ORGANIZED HEALTH CARE EDUCATION/TRAINING PROGRAM

## 2025-01-23 PROCEDURE — 258N000003 HC RX IP 258 OP 636: Performed by: STUDENT IN AN ORGANIZED HEALTH CARE EDUCATION/TRAINING PROGRAM

## 2025-01-23 PROCEDURE — 250N000009 HC RX 250: Performed by: PHYSICIAN ASSISTANT

## 2025-01-23 PROCEDURE — 250N000009 HC RX 250: Performed by: NURSE ANESTHETIST, CERTIFIED REGISTERED

## 2025-01-23 PROCEDURE — 250N000011 HC RX IP 250 OP 636: Performed by: NURSE ANESTHETIST, CERTIFIED REGISTERED

## 2025-01-23 RX ORDER — PROPOFOL 10 MG/ML
INJECTION, EMULSION INTRAVENOUS PRN
Status: DISCONTINUED | OUTPATIENT
Start: 2025-01-23 | End: 2025-01-23

## 2025-01-23 RX ORDER — LIDOCAINE 40 MG/G
CREAM TOPICAL
Status: DISCONTINUED | OUTPATIENT
Start: 2025-01-23 | End: 2025-01-23 | Stop reason: HOSPADM

## 2025-01-23 RX ORDER — FLUMAZENIL 0.1 MG/ML
0.2 INJECTION, SOLUTION INTRAVENOUS
Status: DISCONTINUED | OUTPATIENT
Start: 2025-01-23 | End: 2025-01-23 | Stop reason: HOSPADM

## 2025-01-23 RX ORDER — PROPOFOL 10 MG/ML
INJECTION, EMULSION INTRAVENOUS CONTINUOUS PRN
Status: DISCONTINUED | OUTPATIENT
Start: 2025-01-23 | End: 2025-01-23

## 2025-01-23 RX ORDER — SODIUM CHLORIDE, SODIUM LACTATE, POTASSIUM CHLORIDE, CALCIUM CHLORIDE 600; 310; 30; 20 MG/100ML; MG/100ML; MG/100ML; MG/100ML
INJECTION, SOLUTION INTRAVENOUS CONTINUOUS
Status: DISCONTINUED | OUTPATIENT
Start: 2025-01-23 | End: 2025-01-23 | Stop reason: HOSPADM

## 2025-01-23 RX ORDER — NALOXONE HYDROCHLORIDE 0.4 MG/ML
0.2 INJECTION, SOLUTION INTRAMUSCULAR; INTRAVENOUS; SUBCUTANEOUS
Status: DISCONTINUED | OUTPATIENT
Start: 2025-01-23 | End: 2025-01-23 | Stop reason: HOSPADM

## 2025-01-23 RX ORDER — NALOXONE HYDROCHLORIDE 0.4 MG/ML
0.4 INJECTION, SOLUTION INTRAMUSCULAR; INTRAVENOUS; SUBCUTANEOUS
Status: DISCONTINUED | OUTPATIENT
Start: 2025-01-23 | End: 2025-01-23 | Stop reason: HOSPADM

## 2025-01-23 RX ORDER — LIDOCAINE HYDROCHLORIDE 20 MG/ML
INJECTION, SOLUTION INFILTRATION; PERINEURAL PRN
Status: DISCONTINUED | OUTPATIENT
Start: 2025-01-23 | End: 2025-01-23

## 2025-01-23 RX ADMIN — LIDOCAINE HYDROCHLORIDE 0.1 ML: 10 INJECTION, SOLUTION EPIDURAL; INFILTRATION; INTRACAUDAL; PERINEURAL at 07:52

## 2025-01-23 RX ADMIN — PROPOFOL 30 MG: 10 INJECTION, EMULSION INTRAVENOUS at 08:34

## 2025-01-23 RX ADMIN — PROPOFOL 100 MG: 10 INJECTION, EMULSION INTRAVENOUS at 08:29

## 2025-01-23 RX ADMIN — SODIUM CHLORIDE, POTASSIUM CHLORIDE, SODIUM LACTATE AND CALCIUM CHLORIDE: 600; 310; 30; 20 INJECTION, SOLUTION INTRAVENOUS at 07:52

## 2025-01-23 RX ADMIN — LIDOCAINE HYDROCHLORIDE 80 MG: 20 INJECTION, SOLUTION INFILTRATION; PERINEURAL at 08:27

## 2025-01-23 RX ADMIN — PROPOFOL 150 MCG/KG/MIN: 10 INJECTION, EMULSION INTRAVENOUS at 08:29

## 2025-01-23 ASSESSMENT — ACTIVITIES OF DAILY LIVING (ADL)
ADLS_ACUITY_SCORE: 41
ADLS_ACUITY_SCORE: 41

## 2025-01-23 NOTE — ANESTHESIA CARE TRANSFER NOTE
Patient: Francesca Rivera    Procedure: Procedure(s):  Colonoscopy       Diagnosis: Colon cancer screening [Z12.11]  Diagnosis Additional Information: No value filed.    Anesthesia Type:   General     Note:    Oropharynx: oropharynx clear of all foreign objects  Level of Consciousness: awake  Oxygen Supplementation: room air    Independent Airway: airway patency satisfactory and stable  Dentition: dentition unchanged  Vital Signs Stable: post-procedure vital signs reviewed and stable  Report to RN Given: handoff report given  Patient transferred to: Phase II    Handoff Report: Identifed the Patient, Identified the Reponsible Provider, Reviewed the pertinent medical history, Discussed the surgical course, Reviewed Intra-OP anesthesia mangement and issues during anesthesia, Set expectations for post-procedure period and Allowed opportunity for questions and acknowledgement of understanding      Vitals:  Vitals Value Taken Time   /76 01/23/25 0849   Temp     Pulse 85 01/23/25 0849   Resp     SpO2 95 % 01/23/25 0853   Vitals shown include unfiled device data.    Electronically Signed By: JAZIEL Perez CRNA  January 23, 2025  8:54 AM

## 2025-01-23 NOTE — H&P
[facility]    Pre-Endoscopy History and Physical     Francesca Rivera MRN# [id number]   YOB: 1965 Age: 59 year old     Date of Procedure: 1/23/2025  Primary care provider: Karol Yousif  Type of Endoscopy: Colonoscopy with possible biopsy, possible polypectomy  Reason for Procedure: surveillance  Type of Anesthesia Anticipated: Conscious Sedation    HPI:    Francesca is a 59 year old female who will be undergoing the above procedure.      A history and physical has been performed. The patient's medications and allergies have been reviewed. The risks and benefits of the procedure and the sedation options and risks were discussed with the patient.  All questions were answered and informed consent was obtained.      She denies a personal or family history of anesthesia complications or bleeding disorders.     Colonoscopy, last one 2021 with 1 cm polyp, surveillance. Also diverticulosis throughout. No AC. ASA II. No significant surgical hx. Father and maternal grandmother with colon cancer.     Patient Active Problem List   Diagnosis    Mild intermittent asthma    Allergic rhinitis    Esophageal reflux    Post-menopausal atrophic vaginitis    Lichen sclerosis et atrophicus    Seasonal Moderate persistent asthma    Age-related osteoporosis without current pathological fracture    Cervical high risk HPV (human papillomavirus) test positive    Hyperlipidemia LDL goal <100    Atherosclerotic heart disease of native coronary artery with other forms of angina pectoris        Past Medical History:   Diagnosis Date    Asthma     Asymptomatic postmenopausal status 12/04/2007    Problem list name updated by automated process. Provider to review      Cervical high risk HPV (human papillomavirus) test positive 06/07/2019    See problem list    Tobacco use disorder 12/29/2005        Past Surgical History:   Procedure Laterality Date    COLONOSCOPY N/A 5/20/2016    Procedure: COLONOSCOPY;   Surgeon: Darell Mayes MD;  Location: [facility]    COLONOSCOPY N/A 11/3/2021    Procedure: COLONOSCOPY, FLEXIBLE, WITH LESION REMOVAL USING SNARE;  Surgeon: Chris Alicia DO;  Location: [facility]    TUBAL LIGATION         Social History     Tobacco Use    Smoking status: Former     Current packs/day: 0.00     Average packs/day: 1 pack/day for 28.0 years (28.0 ttl pk-yrs)     Types: Cigarettes     Start date: 1982     Quit date: 2010     Years since quittin.6    Smokeless tobacco: Never   Substance Use Topics    Alcohol use: Yes     Comment: occ       Family History   Problem Relation Age of Onset    Alcohol/Drug Father     Diabetes Father     Hypertension Father     Psychotic Disorder Father         bipolar    Asthma Father     Cancer - colorectal Father 60    Depression Father     Cancer Maternal Grandfather         lung    Arthritis Maternal Grandfather     Cancer Paternal Grandmother         stomach    Psychotic Disorder Paternal Grandmother         bipolar    Depression Paternal Grandmother     Arthritis Paternal Grandmother     Breast Cancer Maternal Grandmother     Cancer - colorectal Maternal Grandmother     Arthritis Maternal Grandmother     Depression Daughter     Lupus Mother     C.A.D. Paternal Grandfather     Cerebrovascular Disease Paternal Grandfather     Arthritis Paternal Grandfather     Depression Son        Prior to Admission medications    Medication Sig Start Date End Date Taking? Authorizing Provider   acetaminophen (TYLENOL) 500 MG tablet Take 500-1,000 mg by mouth every 6 hours as needed for mild pain   Yes Reported, Patient   ADVIL 200 MG OR CAPS prn   Yes Reported, Patient   albuterol (VENTOLIN HFA) 108 (90 Base) MCG/ACT inhaler Inhale 2 puffs into the lungs every 4 hours as needed for shortness of breath or wheezing FILL ALBUTEROL BRAND/GENERIC AS COVERED 24  Yes Karol Yousif MD   aspirin (ASA) 81 MG EC tablet Take 1 tablet (81 mg) by mouth daily 23   Yes Georgiana Lewis APRN CNP   CITRACAL + D 250-62.5 MG-UNIT OR TABS  12/4/07  Yes Kelsey Camejo MD   clobetasol (TEMOVATE) 0.05 % external ointment Apply  topically. Apply small amount twice weekly as needed for maintenance. May use daily for 1-2 weeks for exacerbation. 10/10/22  Yes Karol Yousif MD   estradiol (ESTRACE) 0.5 MG tablet INSERT 1 TABLET VAGINALLY 2TIMES A WEEK. 9/16/24  Yes Karol Yousif MD   FLAXSEED OIL OR 1tab twice daily   Yes Reported, Patient   fluticasone (FLONASE) 50 MCG/ACT nasal spray Spray 2 sprays into both nostrils daily 1/15/24  Yes Karol Yousif MD   fluticasone-salmeterol (ADVAIR) 250-50 MCG/ACT inhaler Inhale 1 puff into the lungs every 12 hours 1/18/24  Yes Karol Yousif MD   IBANdronate (BONIVA) 150 MG tablet TAKE 1 TABLET EVERY 30 DAYS 9/16/24  Yes Karol Yousif MD   metoprolol succinate ER (TOPROL XL) 25 MG 24 hr tablet Take 1 tablet (25 mg) by mouth daily. 9/16/24  Yes Marjorie Shen MD   Milk Thistle-Dand-Fennel-Licor (MILK THISTLE XTRA) CAPS capsule  10/1/22  Yes Reported, Patient   multivitamin w/minerals (THERA-VIT-M) tablet Take 1 tablet by mouth daily   Yes Reported, Patient   omeprazole (PRILOSEC) 40 MG DR capsule TAKE 1 CAPSULE DAILY 12/16/24  Yes Karol Yousif MD   rosuvastatin (CRESTOR) 20 MG tablet TAKE 1 TABLET DAILY 9/16/24  Yes Marjorie Shen MD   nitroGLYcerin (NITROSTAT) 0.4 MG sublingual tablet For chest pain place 1 tablet under the tongue every 5 minutes for 3 doses. If symptoms persist 5 minutes after 1st dose call 911. 8/23/24   Marjorie Shen MD   scopolamine (TRANSDERM) 1 MG/3DAYS 72 hr patch Place 1 patch onto the skin every 72 hours 3/20/24   Karol Yousif MD       Allergies   Allergen Reactions    Nka [No Known Allergies]         REVIEW OF SYSTEMS:   5 point ROS negative except as noted above in HPI, including Gen., Resp., CV, GI &  system review.    PHYSICAL EXAM:   /83 (BP  "Location: Right arm)   Pulse 85   Temp 97.6  F (36.4  C) (Oral)   Ht 1.626 m (5' 4\")   Wt 78 kg (172 lb)   LMP 08/15/2006   SpO2 99%   BMI 29.52 kg/m   Estimated body mass index is 29.52 kg/m  as calculated from the following:    Height as of this encounter: 1.626 m (5' 4\").    Weight as of this encounter: 78 kg (172 lb).   Constitutional: Awake, alert, no acute distress.  Eyes: No scleral icterus.  Conjunctiva are without injection.  ENMT: Mucous membranes moist, dentition and gums are intact.   Neck: Soft, supple, trachea midline.    Endocrine: n/a   Lymphatic: There is no cervical, submandibularadenopathy.  Respiratory: normal efforts on room air  Cardiovascular: extremities warm and well perfused  Abdomen: Non-distended, non-tender,  No masses,  Musculoskeletal: Full range of motion in the upper and lower extremities.    Skin: No skin rashes or lesions to inspection.  No petechia.    Neurologic: alerted and oriented 3x  Psychiatric: The patient's affect is not blunted and mood is appropriate.  DIAGNOSTICS:    Not indicated    IMPRESSION   ASA Class 2 - Mild systemic disease    PLAN:   Plan for Colonoscopy with possible biopsy, possible polypectomy. We discussed the risks, benefits and alternatives and the patient wished to proceed.  Patient is cleared for the above procedure.    The above has been forwarded to the consulting provider.    Sg Arnett MD on 1/23/2025 at 8:19 AM  [facility]        "

## 2025-01-23 NOTE — ANESTHESIA POSTPROCEDURE EVALUATION
Patient: Francesca Rivera    Procedure: Procedure(s):  Colonoscopy       Anesthesia Type:  General    Note:  Disposition: Outpatient   Postop Pain Control: Uneventful            Sign Out: Well controlled pain   PONV: No   Neuro/Psych: Uneventful            Sign Out: Acceptable/Baseline neuro status   Airway/Respiratory: Uneventful            Sign Out: Acceptable/Baseline resp. status   CV/Hemodynamics: Uneventful            Sign Out: Acceptable CV status; No obvious hypovolemia; No obvious fluid overload   Other NRE: NONE   DID A NON-ROUTINE EVENT OCCUR? No           Last vitals:  Vitals Value Taken Time   /78 01/23/25 0900   Temp 36.7  C (98.1  F) 01/23/25 0850   Pulse 93 01/23/25 0900   Resp 16 01/23/25 0900   SpO2 96 % 01/23/25 0910   Vitals shown include unfiled device data.    Electronically Signed By: JAZIEL Perez CRNA  January 23, 2025  9:32 AM

## 2025-02-03 ENCOUNTER — MYC MEDICAL ADVICE (OUTPATIENT)
Dept: FAMILY MEDICINE | Facility: CLINIC | Age: 60
End: 2025-02-03
Payer: COMMERCIAL

## 2025-02-03 DIAGNOSIS — M81.0 AGE-RELATED OSTEOPOROSIS WITHOUT CURRENT PATHOLOGICAL FRACTURE: ICD-10-CM

## 2025-02-04 RX ORDER — IBANDRONATE SODIUM 150 MG/1
150 TABLET, FILM COATED ORAL
Qty: 3 TABLET | Refills: 2 | Status: SHIPPED | OUTPATIENT
Start: 2025-02-04

## 2025-02-04 NOTE — TELEPHONE ENCOUNTER
Pt had DEXA scan done on 10-30-24 per chart review.     Last Written Prescription Date:  9-16-24  Last Fill Quantity: 3  # refills:  0  Last office visit: 10/23/2024 ; last virtual visit: Visit date not found with prescribing provider:     Future Office Visit:  Not scheduled at this time.    Requested Prescriptions   Pending Prescriptions Disp Refills    IBANdronate (BONIVA) 150 MG tablet 3 tablet      Sig: Take 1 tablet (150 mg) by mouth every 30 days.       There is no refill protocol information for this order

## 2025-04-14 DIAGNOSIS — J30.89 OTHER ALLERGIC RHINITIS: ICD-10-CM

## 2025-04-14 DIAGNOSIS — J45.40 MODERATE PERSISTENT ASTHMA WITHOUT COMPLICATION: ICD-10-CM

## 2025-04-14 DIAGNOSIS — N95.2 POST-MENOPAUSAL ATROPHIC VAGINITIS: ICD-10-CM

## 2025-04-14 RX ORDER — ESTRADIOL 0.5 MG/1
TABLET ORAL
Qty: 24 TABLET | Refills: 0 | Status: SHIPPED | OUTPATIENT
Start: 2025-04-14

## 2025-04-14 RX ORDER — FLUTICASONE PROPIONATE 50 MCG
2 SPRAY, SUSPENSION (ML) NASAL DAILY
Qty: 48 G | Refills: 3 | Status: SHIPPED | OUTPATIENT
Start: 2025-04-14

## 2025-04-15 RX ORDER — FLUTICASONE PROPIONATE AND SALMETEROL 250; 50 UG/1; UG/1
POWDER RESPIRATORY (INHALATION)
Qty: 180 EACH | Refills: 1 | Status: SHIPPED | OUTPATIENT
Start: 2025-04-15

## 2025-05-03 ASSESSMENT — ASTHMA QUESTIONNAIRES
QUESTION_1 LAST FOUR WEEKS HOW MUCH OF THE TIME DID YOUR ASTHMA KEEP YOU FROM GETTING AS MUCH DONE AT WORK, SCHOOL OR AT HOME: NONE OF THE TIME
QUESTION_3 LAST FOUR WEEKS HOW OFTEN DID YOUR ASTHMA SYMPTOMS (WHEEZING, COUGHING, SHORTNESS OF BREATH, CHEST TIGHTNESS OR PAIN) WAKE YOU UP AT NIGHT OR EARLIER THAN USUAL IN THE MORNING: NOT AT ALL
QUESTION_4 LAST FOUR WEEKS HOW OFTEN HAVE YOU USED YOUR RESCUE INHALER OR NEBULIZER MEDICATION (SUCH AS ALBUTEROL): NOT AT ALL
QUESTION_5 LAST FOUR WEEKS HOW WOULD YOU RATE YOUR ASTHMA CONTROL: WELL CONTROLLED
QUESTION_2 LAST FOUR WEEKS HOW OFTEN HAVE YOU HAD SHORTNESS OF BREATH: ONCE OR TWICE A WEEK
ACT_TOTALSCORE: 23

## 2025-05-08 ENCOUNTER — OFFICE VISIT (OUTPATIENT)
Dept: FAMILY MEDICINE | Facility: CLINIC | Age: 60
End: 2025-05-08
Payer: COMMERCIAL

## 2025-05-08 VITALS
HEIGHT: 64 IN | SYSTOLIC BLOOD PRESSURE: 118 MMHG | TEMPERATURE: 99.2 F | BODY MASS INDEX: 31.07 KG/M2 | DIASTOLIC BLOOD PRESSURE: 80 MMHG | WEIGHT: 182 LBS | OXYGEN SATURATION: 96 % | RESPIRATION RATE: 20 BRPM | HEART RATE: 82 BPM

## 2025-05-08 DIAGNOSIS — L57.0 AK (ACTINIC KERATOSIS): ICD-10-CM

## 2025-05-08 DIAGNOSIS — M25.571 PAIN IN JOINT, ANKLE AND FOOT, RIGHT: Primary | ICD-10-CM

## 2025-05-08 ASSESSMENT — PAIN SCALES - GENERAL: PAINLEVEL_OUTOF10: MILD PAIN (2)

## 2025-05-08 NOTE — PROGRESS NOTES
"  Assessment & Plan     Pain in joint, ankle and foot, right  Right ankle inversion injury 3 months ago, ongoing pain with walking, extreme dorsiflexion.   Xray unremarkable, though I question a lucency at the distal end of the fibula and she does have some point tenderness there, ?avulsion injury  Will have her see ortho/sports medicine    - XR Ankle Right G/E 3 Views; Future  - Orthopedic  Referral; Future    Actinic keratosis -  Tip of nose just to the right of midline  Liquid nitrogen used to treat this in three freeze/thaw cycles      BMI  Estimated body mass index is 31.24 kg/m  as calculated from the following:    Height as of this encounter: 1.626 m (5' 4\").    Weight as of this encounter: 82.6 kg (182 lb).             Bianca Lopez is a 59 year old, presenting for the following health issues:  Musculoskeletal Problem        5/8/2025     2:50 PM   Additional Questions   Roomed by Adelaida WELCH CMA   Accompanied by Self     Musculoskeletal Problem    History of Present Illness       Reason for visit:  Ankle pain  Symptoms include:  Pain when standing and walking  Symptom intensity:  Moderate  Symptom progression:  Staying the same  Had these symptoms before:  No   She is taking medications regularly.      - Dry patch on tip of nose.       Foot Pain  Onset: 3 months  Description:   Location: Right foot/ankle  Character: Sharp paon when putting wieght on area otherwise dull ache  Intensity: moderate  Progression of Symptoms: same  Accompanying Signs & Symptoms:  Other symptoms: hard to put pressure on area  History:   Previous similar pain: no     Precipitating factors:   Trauma or overuse: YES- Rolled ankle when walking  Alleviating factors:  Improved by: nothing    Therapies Tried and outcome: None          Review of Systems  Constitutional, HEENT, cardiovascular, pulmonary, gi and gu systems are negative, except as otherwise noted.      Objective    /80   Pulse 82   Temp 99.2  F (37.3  C) " "(Tympanic)   Resp 20   Ht 1.626 m (5' 4\")   Wt 82.6 kg (182 lb)   LMP 08/15/2006   SpO2 96%   BMI 31.24 kg/m    Body mass index is 31.24 kg/m .  Physical Exam   GENERAL: alert and no distress  MS: ANKLE: the injured ankle reveals no swelling but mild tenderness over the distal lateral malleolus. No tenderness over the medial aspect of the ankle. The fifth metatarsal is not tender. The ankle joint is intact without excessive opening on stressing.  The rest of the foot, ankle and leg exam is normal.    SKIN: actinic keratosis right tip of nose - 3 mm or so in diameter - treated with liquid nitrogen x 3    Xray - Reviewed and interpreted by me.  No acute fracture.  ?small lucency of the distal fibula        SKIN: Cryotherapy    Date/Time: 5/8/2025 3:36 PM    Performed by: Karol Yousif MD  Authorized by: Karol Yousif MD  Local anesthesia used: no    Anesthesia:  Local anesthesia used: no    Sedation:  Patient sedated: no    Comments: Tip of nose (right) treated x 3 with liquid nitrogen - actinic keratosis             Signed Electronically by: Karol Yousif MD    "

## 2025-05-09 ENCOUNTER — RESULTS FOLLOW-UP (OUTPATIENT)
Dept: FAMILY MEDICINE | Facility: CLINIC | Age: 60
End: 2025-05-09

## 2025-05-09 NOTE — RESULT ENCOUNTER NOTE
Francesca,    Xray read as normal, no fracture      Please contact my office if you have questions.    Karol Yousif M.D.

## 2025-05-12 ENCOUNTER — PATIENT OUTREACH (OUTPATIENT)
Dept: CARE COORDINATION | Facility: CLINIC | Age: 60
End: 2025-05-12
Payer: COMMERCIAL

## 2025-05-22 ENCOUNTER — THERAPY VISIT (OUTPATIENT)
Dept: PHYSICAL THERAPY | Facility: CLINIC | Age: 60
End: 2025-05-22
Attending: PODIATRIST
Payer: COMMERCIAL

## 2025-05-22 ENCOUNTER — OFFICE VISIT (OUTPATIENT)
Dept: PODIATRY | Facility: CLINIC | Age: 60
End: 2025-05-22
Attending: FAMILY MEDICINE
Payer: COMMERCIAL

## 2025-05-22 VITALS — WEIGHT: 182 LBS | BODY MASS INDEX: 31.07 KG/M2 | HEIGHT: 64 IN

## 2025-05-22 DIAGNOSIS — M76.71 PERONEAL TENDINITIS OF RIGHT LOWER LEG: Primary | ICD-10-CM

## 2025-05-22 DIAGNOSIS — M25.571 PAIN IN JOINT, ANKLE AND FOOT, RIGHT: ICD-10-CM

## 2025-05-22 PROCEDURE — 97161 PT EVAL LOW COMPLEX 20 MIN: CPT | Mod: GP | Performed by: PHYSICAL THERAPIST

## 2025-05-22 PROCEDURE — 97110 THERAPEUTIC EXERCISES: CPT | Mod: GP | Performed by: PHYSICAL THERAPIST

## 2025-05-22 PROCEDURE — 97116 GAIT TRAINING THERAPY: CPT | Mod: GP | Performed by: PHYSICAL THERAPIST

## 2025-05-22 ASSESSMENT — ACTIVITIES OF DAILY LIVING (ADL)
LEFS_SCORE(%): 0
GETTING_INTO_AND_OUT_OF_A_BATH: A LITTLE BIT OF DIFFICULTY
STANDING_FOR_1_HOUR: EXTREME DIFFICULTY OR UNABLE TO PERFORM ACTIVITY
RUNNING_ON_UNEVEN_GROUND: EXTREME DIFFICULTY OR UNABLE TO PERFORM ACTIVITY
MAKING_SHARP_TURNS_WHILE_RUNNING_FAST: EXTREME DIFFICULTY OR UNABLE TO PERFORM ACTIVITY
YOUR_USUAL_HOBBIES,_RECREATIONAL_OR_SPORTING_ACTIVITIES: A LITTLE BIT OF DIFFICULTY
RUNNING_ON_EVEN_GROUND: QUITE A BIT OF DIFFICULTY
WALKING_BETWEEN_ROOMS: A LITTLE BIT OF DIFFICULTY
LIFTING_AN_OBJECT,_LIKE_A_BAG_OF_GROCERIES_FROM_THE_FLOOR: NO DIFFICULTY
PERFORMING_LIGHT_ACTIVITIES_AROUND_YOUR_HOME: A LITTLE BIT OF DIFFICULTY
SHOPPING: EXTREME DIFFICULTY OR UNABLE TO PERFORM ACTIVITY
LEFS_RAW_SCORE: 0
WALKING_2_BLOCKS: MODERATE DIFFICULTY
GETTING_INTO_OR_OUT_OF_A_CAR: A LITTLE BIT OF DIFFICULTY
PERFORMING_HEAVY_ACTIVITIES_AROUND_YOUR_HOME: A LITTLE BIT OF DIFFICULTY
SQUATTING: A LITTLE BIT OF DIFFICULTY
PLEASE_INDICATE_YOR_PRIMARY_REASON_FOR_REFERRAL_TO_THERAPY:: FOOT AND/OR ANKLE
ANY_OF_YOUR_USUAL_WORK,_HOUSEWORK_OR_SCHOOL_ACTIVITIES: A LITTLE BIT OF DIFFICULTY
PUTTING_ON_YOUR_SHOES_OR_SOCKS: A LITTLE BIT OF DIFFICULTY
SITTING_FOR_1_HOUR: NO DIFFICULTY
ROLLING_OVER_IN_BED: A LITTLE BIT OF DIFFICULTY
GOING_UP_OR_DOWN_10_STAIRS: QUITE A BIT OF DIFFICULTY
WALKING_A_MILE: QUITE A BIT OF DIFFICULTY

## 2025-05-22 ASSESSMENT — PAIN SCALES - GENERAL: PAINLEVEL_OUTOF10: MODERATE PAIN (4)

## 2025-05-22 NOTE — LETTER
5/22/2025      Francesca Rivera  [id number]  [place] [address]      Dear Colleague,    Thank you for referring your patient, Francesca Rivera, to the [facility]. Please see a copy of my visit note below.    PATIENT HISTORY:  Francesca Rivera is a 59 year old female who presents to clinic in consultation at the request of  Karol Yousif M.D. with a chief complaint of painful right ankle.  The patient is seen by themselves.  The patient relates the pain is primarily located around the outside of the right ankle.  Patient describes injury as a twisting injury of the right ankle the patient relates that the symptoms have been going on for several week(s).  The patient has previously tried different shoes with little relief.    Any previous notes and studies that pertain to the patient's condition were reviewed.    Pertinent medical, surgical and family history was reviewed in the Epic chart.    Past Medical History:   Past Medical History:   Diagnosis Date     Asthma      Asymptomatic postmenopausal status 12/04/2007    Problem list name updated by automated process. Provider to review       Cervical high risk HPV (human papillomavirus) test positive 06/07/2019    See problem list     Tobacco use disorder 12/29/2005       Medications:   Current Outpatient Medications:      acetaminophen (TYLENOL) 500 MG tablet, Take 500-1,000 mg by mouth every 6 hours as needed for mild pain, Disp: , Rfl:      ADVIL 200 MG OR CAPS, prn, Disp: , Rfl:      albuterol (VENTOLIN HFA) 108 (90 Base) MCG/ACT inhaler, Inhale 2 puffs into the lungs every 4 hours as needed for shortness of breath or wheezing FILL ALBUTEROL BRAND/GENERIC AS COVERED, Disp: 36 g, Rfl: 3     aspirin (ASA) 81 MG EC tablet, Take 1 tablet (81 mg) by mouth daily, Disp: , Rfl:      CITRACAL + D 250-62.5 MG-UNIT OR TABS, , Disp: , Rfl: 0     clobetasol (TEMOVATE) 0.05 % external ointment, Apply  topically. Apply small amount  twice weekly as needed for maintenance. May use daily for 1-2 weeks for exacerbation., Disp: 45 g, Rfl: 3     estradiol (ESTRACE) 0.5 MG tablet, INSERT 1 TABLET VAGINALLY 2TIMES A WEEK., Disp: 24 tablet, Rfl: 0     FLAXSEED OIL OR, 1tab twice daily, Disp: , Rfl:      fluticasone (FLONASE) 50 MCG/ACT nasal spray, USE 2 SPRAYS IN EACH       NOSTRIL DAILY, Disp: 48 g, Rfl: 3     fluticasone-salmeterol (WIXELA INHUB) 250-50 MCG/ACT inhaler, USE 1 INHALATION ORALLY    EVERY 12 HOURS, Disp: 180 each, Rfl: 1     IBANdronate (BONIVA) 150 MG tablet, Take 1 tablet (150 mg) by mouth every 30 days., Disp: 3 tablet, Rfl: 2     metoprolol succinate ER (TOPROL XL) 25 MG 24 hr tablet, Take 1 tablet (25 mg) by mouth daily., Disp: 90 tablet, Rfl: 3     Milk Thistle-Dand-Fennel-Licor (MILK THISTLE XTRA) CAPS capsule, , Disp: , Rfl:      multivitamin w/minerals (THERA-VIT-M) tablet, Take 1 tablet by mouth daily, Disp: , Rfl:      nitroGLYcerin (NITROSTAT) 0.4 MG sublingual tablet, For chest pain place 1 tablet under the tongue every 5 minutes for 3 doses. If symptoms persist 5 minutes after 1st dose call 911., Disp: 30 tablet, Rfl: 1     omeprazole (PRILOSEC) 40 MG DR capsule, TAKE 1 CAPSULE DAILY, Disp: 90 capsule, Rfl: 2     rosuvastatin (CRESTOR) 20 MG tablet, TAKE 1 TABLET DAILY, Disp: 90 tablet, Rfl: 4     scopolamine (TRANSDERM) 1 MG/3DAYS 72 hr patch, Place 1 patch onto the skin every 72 hours, Disp: 4 patch, Rfl: 0     Allergies:    Allergies   Allergen Reactions     Nka [No Known Allergies]          LOWER EXTREMITY PHYSICAL EXAM    Dermatologic: Skin is intact to right lower extremity without significant lesions, rash or abrasion.        Vascular: DP & PT pulses are intact & regular on the right.   CFT and skin temperature is normal to the right lower extremity.     Neurologic: Lower extremity sensation is intact to light touch.  No evidence of weakness in the right lower extremity.        Musculoskeletal: Patient is  ambulatory without assistive device or brace.  No gross ankle deformity noted.  No foot or ankle joint effusion is noted.  Noted pain on palpation of the peroneal tendons on the lateral aspect of the right foot and ankle.  Noted edema along the course of the peroneal tendons of the right ankle.    Diagnostics:  Radiographs included three views of the right ankle  demonstrating   no cortical erosions or periosteal elevation.  All joint margins appear stable.  There is no apparent fracture or tumor formation noted.  There is no evidence of foreign body.  The images were independently reviewed by myself along with the patient explaining the findings.      ASSESSMENT / PLAN:     ICD-10-CM    1. Peroneal tendinitis of right lower leg  M76.71 Ankle/Foot Bracing Supplies Order Ankle Brace; Right     Physical Therapy  Referral      2. Pain in joint, ankle and foot, right  M25.571 Orthopedic  Referral     Ankle/Foot Bracing Supplies Order Ankle Brace; Right     Physical Therapy  Referral          I have explained to Francesca about the conditions.  We discussed the underlying contributing factors to the condition as well as both conservative and surgical treatment options along with expected length of recovery.  At this time, the patient was educated on the importance of offloading supportive shoes and other devices.  I demonstrated to the patient calf stretches to perform every hour daily until symptoms resolve.  After symptoms resolve, the patient was advised to perform the stretches 3 times daily to prevent future recurrence.  The patient was instructed to perform warm soaks with Epson salt after which to also apply over-the-counter Voltaren gel to deeply massage the injured tissue.  The patient was instructed to do this on a daily basis until symptoms resolve.   The patient was fitted with a Tri-Lock ankle brace that will aid in offloading the tension forces to the soft tissues and prevent  further inflammation.  The patient was referred to physical therapy for fascial tooling of the peroneal tendons on the lateral aspect of the right ankle.  The patient may return in four weeks for reevaluation to determine if any further treatment will be needed.      Francesca verbalized agreement with and understanding of the rational for the diagnosis and treatment plan.  All questions were answered to best of my ability and the patient's satisfaction. The patient was advised to contact the clinic with any questions that may arise after the clinic visit.      Disclaimer: This note consists of symbols derived from keyboarding, dictation and/or voice recognition software. As a result, there may be errors in the script that have gone undetected. Please consider this when interpreting information found in this chart.       MARIANA Pollack.P.KATI., F.A.C.F.A.S.      Again, thank you for allowing me to participate in the care of your patient.        Sincerely,        Westley Stout DPM    Electronically signed

## 2025-05-22 NOTE — PROGRESS NOTES
PHYSICAL THERAPY EVALUATION  Type of Visit: Evaluation       Fall Risk Screen:  Have you fallen 2 or more times in the past year?: No  Have you fallen and had an injury in the past year?: No  Is patient receiving Physical Therapy Services?: No    Subjective  Pain on the outside of the right ankle after a twisting injury that has been going on several weeks.  Had podiatry apt this morning and they are recommending supportive shoes, trilock brace, PT for IASTM.           Presenting condition or subjective complaint: Right ankle  Date of onset: 04/22/25    Relevant medical history: Asthma; Chest pain; Heart problems; History of fractures; Menopause; Osteoarthritis; Osteoporosis; Overweight; Smoking; Vision problems   Dates & types of surgery: Tubal ligation 1991    Prior diagnostic imaging/testing results: X-ray     Prior therapy history for the same diagnosis, illness or injury: No      Prior Level of Function  Transfers:   Ambulation:   ADL:   IADL:     Living Environment  Social support: With family members   Type of home: House; Multi-level   Stairs to enter the home: No       Ramp: No   Stairs inside the home: Yes 26 Is there a railing: Yes     Help at home:    Equipment owned: [employer]     Employment: No    Hobbies/Interests: Rock concerts    Patient goals for therapy: Walk without pain    Pain assessment:      Objective   FOOT/ANKLE EVALUATION  PAIN: Pain Level at Rest: 0/10  Pain Level with Use: 6/10  Pain Location: outside of right ankle, sometimes will shoot up along the fibula  Pain Quality: Aching and Shooting  Pain Frequency: intermittent  Pain is Worst: daytime  Pain is Exacerbated By: stairs, walking, putting weight on it  Pain is Relieved By: OTC medications   Pain Progression: Unchanged  INTEGUMENTARY (edema, incisions): WNL  GAIT:   Weightbearing Status: WBAT  Assistive Device(s): None  Gait Deviations: antalgic gait favoring   BALANCE/PROPRIOCEPTION:   WEIGHT BEARING ALIGNMENT:   NON-WEIGHTBEARING  ALIGNMENT:    ROM:   (Degrees) Left AROM Left PROM  Right AROM Right PROM   Ankle Dorsiflexion   5    Ankle Plantarflexion   40 painful    Ankle Inversion   30 painful outside of ankle    Ankle Eversion   15    Great Toe Flexion       Great Toe Extension       STRENGTH: resisted ankle inversion/eversion/DF no pain,  resisted ankle PF strong but painful when letting go.   SPECIAL TESTS:   Ligamentous Stability     Anterior Drawer  Positive,     Kleiger ER Test     Longitudinal Arch Angle Test     Talar Tilt       FUNCTIONAL TESTS:   PALPATION: mild swelling around lateral malleoli   JOINT MOBILITY: WFL    Assessment & Plan   CLINICAL IMPRESSIONS  Medical Diagnosis: Peroneal tendinitis of right lower leg (M76.71)  - Primary    Pain in joint, ankle and foot, right    Treatment Diagnosis: right foot pain   Impression/Assessment: Patient is a 59 year old female with right ankle pain complaints.  The following significant findings have been identified: Pain, Decreased ROM/flexibility, Decreased joint mobility, Decreased strength, Impaired gait, Impaired muscle performance, and Decreased activity tolerance. These impairments interfere with their ability to perform self care tasks, work tasks, recreational activities, household chores, household mobility, and community mobility as compared to previous level of function.     Clinical Decision Making (Complexity):  Clinical Presentation: Stable/Uncomplicated  Clinical Presentation Rationale: based on medical and personal factors listed in PT evaluation  Clinical Decision Making (Complexity): Low complexity    PLAN OF CARE  Treatment Interventions:  Modalities: Cupping, Dry Needling, E-stim, Iontophoresis  Interventions: Gait Training, Manual Therapy, Neuromuscular Re-education, Therapeutic Activity, Therapeutic Exercise, Self-Care/Home Management    Long Term Goals     PT Goal 1  Goal Identifier: 1  Goal Description: Patient will be able to walk with non antalgic gait pattern  with no AD  Rationale: to maximize safety and independence within the home  Target Date: 06/19/25  PT Goal 2  Goal Identifier: 2  Goal Description: Patient will be able to complete SL heel raise on Right leg x 10 with UE support  Rationale: to maximize safety and independence with performance of ADLs and functional tasks  Target Date: 06/19/25  PT Goal 3  Goal Identifier: 3  Goal Description: Patient will be able to descend stairs with reciprocal gait pattern with no ankle pain  Rationale: to maximize safety and independence within the home  Target Date: 07/17/25      Frequency of Treatment: 1x/week  Duration of Treatment: 8 weeks    Recommended Referrals to Other Professionals:   Education Assessment:   Learner/Method: Patient  Education Comments: educated on role of PT, POC and HEP    Risks and benefits of evaluation/treatment have been explained.   Patient/Family/caregiver agrees with Plan of Care.     Evaluation Time:     PT Eval, Low Complexity Minutes (90792): 15       Signing Clinician: Bekah Womack PT

## 2025-05-22 NOTE — NURSING NOTE
"Chief Complaint   Patient presents with    Consult     Right ankle injury- happened in mid-february- not getting better- rolled ankle       Initial Ht 1.626 m (5' 4\")   Wt 82.6 kg (182 lb)   LMP 08/15/2006   BMI 31.24 kg/m   Estimated body mass index is 31.24 kg/m  as calculated from the following:    Height as of this encounter: 1.626 m (5' 4\").    Weight as of this encounter: 82.6 kg (182 lb).  Medications and allergies reviewed.      Georgiana PEÑA MA    "

## 2025-05-22 NOTE — PATIENT INSTRUCTIONS

## 2025-05-22 NOTE — PROGRESS NOTES
PATIENT HISTORY:  Francesca Rivera is a 59 year old female who presents to clinic in consultation at the request of  Karol Yousif M.D. with a chief complaint of painful right ankle.  The patient is seen by themselves.  The patient relates the pain is primarily located around the outside of the right ankle.  Patient describes injury as a twisting injury of the right ankle the patient relates that the symptoms have been going on for several week(s).  The patient has previously tried different shoes with little relief.    Any previous notes and studies that pertain to the patient's condition were reviewed.    Pertinent medical, surgical and family history was reviewed in the [facility] chart.    Past Medical History:   Past Medical History:   Diagnosis Date    Asthma     Asymptomatic postmenopausal status 12/04/2007    Problem list name updated by automated process. Provider to review      Cervical high risk HPV (human papillomavirus) test positive 06/07/2019    See problem list    Tobacco use disorder 12/29/2005       Medications:   Current Outpatient Medications:     acetaminophen (TYLENOL) 500 MG tablet, Take 500-1,000 mg by mouth every 6 hours as needed for mild pain, Disp: , Rfl:     ADVIL 200 MG OR CAPS, prn, Disp: , Rfl:     albuterol (VENTOLIN HFA) 108 (90 Base) MCG/ACT inhaler, Inhale 2 puffs into the lungs every 4 hours as needed for shortness of breath or wheezing FILL ALBUTEROL BRAND/GENERIC AS COVERED, Disp: 36 g, Rfl: 3    aspirin (ASA) 81 MG EC tablet, Take 1 tablet (81 mg) by mouth daily, Disp: , Rfl:     CITRACAL + D 250-62.5 MG-UNIT OR TABS, , Disp: , Rfl: 0    clobetasol (TEMOVATE) 0.05 % external ointment, Apply  topically. Apply small amount twice weekly as needed for maintenance. May use daily for 1-2 weeks for exacerbation., Disp: 45 g, Rfl: 3    estradiol (ESTRACE) 0.5 MG tablet, INSERT 1 TABLET VAGINALLY 2TIMES A WEEK., Disp: 24 tablet, Rfl: 0    FLAXSEED OIL OR, 1tab twice daily, Disp: , Rfl:      fluticasone (FLONASE) 50 MCG/ACT nasal spray, USE 2 SPRAYS IN EACH       NOSTRIL DAILY, Disp: 48 g, Rfl: 3    fluticasone-salmeterol (WIXELA INHUB) 250-50 MCG/ACT inhaler, USE 1 INHALATION ORALLY    EVERY 12 HOURS, Disp: 180 each, Rfl: 1    IBANdronate (BONIVA) 150 MG tablet, Take 1 tablet (150 mg) by mouth every 30 days., Disp: 3 tablet, Rfl: 2    metoprolol succinate ER (TOPROL XL) 25 MG 24 hr tablet, Take 1 tablet (25 mg) by mouth daily., Disp: 90 tablet, Rfl: 3    Milk Thistle-Dand-Fennel-Licor (MILK THISTLE XTRA) CAPS capsule, , Disp: , Rfl:     multivitamin w/minerals (THERA-VIT-M) tablet, Take 1 tablet by mouth daily, Disp: , Rfl:     nitroGLYcerin (NITROSTAT) 0.4 MG sublingual tablet, For chest pain place 1 tablet under the tongue every 5 minutes for 3 doses. If symptoms persist 5 minutes after 1st dose call 911., Disp: 30 tablet, Rfl: 1    omeprazole (PRILOSEC) 40 MG DR capsule, TAKE 1 CAPSULE DAILY, Disp: 90 capsule, Rfl: 2    rosuvastatin (CRESTOR) 20 MG tablet, TAKE 1 TABLET DAILY, Disp: 90 tablet, Rfl: 4    scopolamine (TRANSDERM) 1 MG/3DAYS 72 hr patch, Place 1 patch onto the skin every 72 hours, Disp: 4 patch, Rfl: 0     Allergies:    Allergies   Allergen Reactions    Nka [No Known Allergies]          LOWER EXTREMITY PHYSICAL EXAM    Dermatologic: Skin is intact to right lower extremity without significant lesions, rash or abrasion.        Vascular: DP & PT pulses are intact & regular on the right.   CFT and skin temperature is normal to the right lower extremity.     Neurologic: Lower extremity sensation is intact to light touch.  No evidence of weakness in the right lower extremity.        Musculoskeletal: Patient is ambulatory without assistive device or brace.  No gross ankle deformity noted.  No foot or ankle joint effusion is noted.  Noted pain on palpation of the peroneal tendons on the lateral aspect of the right foot and ankle.  Noted edema along the course of the peroneal tendons of the  right ankle.    Diagnostics:  Radiographs included three views of the right ankle  demonstrating   no cortical erosions or periosteal elevation.  All joint margins appear stable.  There is no apparent fracture or tumor formation noted.  There is no evidence of foreign body.  The images were independently reviewed by myself along with the patient explaining the findings.      ASSESSMENT / PLAN:     ICD-10-CM    1. Peroneal tendinitis of right lower leg  M76.71 Ankle/Foot Bracing Supplies Order Ankle Brace; Right     Physical Therapy  Referral      2. Pain in joint, ankle and foot, right  M25.571 Orthopedic  Referral     Ankle/Foot Bracing Supplies Order Ankle Brace; Right     Physical Therapy  Referral          I have explained to Francesca about the conditions.  We discussed the underlying contributing factors to the condition as well as both conservative and surgical treatment options along with expected length of recovery.  At this time, the patient was educated on the importance of offloading supportive shoes and other devices.  I demonstrated to the patient calf stretches to perform every hour daily until symptoms resolve.  After symptoms resolve, the patient was advised to perform the stretches 3 times daily to prevent future recurrence.  The patient was instructed to perform warm soaks with Epson salt after which to also apply over-the-counter Voltaren gel to deeply massage the injured tissue.  The patient was instructed to do this on a daily basis until symptoms resolve.   The patient was fitted with a Tri-Lock ankle brace that will aid in offloading the tension forces to the soft tissues and prevent further inflammation.  The patient was referred to physical therapy for fascial tooling of the peroneal tendons on the lateral aspect of the right ankle.  The patient may return in four weeks for reevaluation to determine if any further treatment will be needed.      Francesca verbalized  agreement with and understanding of the rational for the diagnosis and treatment plan.  All questions were answered to best of my ability and the patient's satisfaction. The patient was advised to contact the clinic with any questions that may arise after the clinic visit.      Disclaimer: This note consists of symbols derived from keyboarding, dictation and/or voice recognition software. As a result, there may be errors in the script that have gone undetected. Please consider this when interpreting information found in this chart.       BE Stout D.P.M., F.A.C.F.A.S.

## 2025-05-27 ENCOUNTER — THERAPY VISIT (OUTPATIENT)
Dept: PHYSICAL THERAPY | Facility: CLINIC | Age: 60
End: 2025-05-27
Attending: PODIATRIST
Payer: COMMERCIAL

## 2025-05-27 DIAGNOSIS — M76.71 PERONEAL TENDINITIS OF RIGHT LOWER LEG: Primary | ICD-10-CM

## 2025-05-27 DIAGNOSIS — M25.571 PAIN IN JOINT, ANKLE AND FOOT, RIGHT: ICD-10-CM

## 2025-05-27 PROCEDURE — 97110 THERAPEUTIC EXERCISES: CPT | Mod: GP | Performed by: PHYSICAL THERAPIST

## 2025-06-10 ENCOUNTER — THERAPY VISIT (OUTPATIENT)
Dept: PHYSICAL THERAPY | Facility: CLINIC | Age: 60
End: 2025-06-10
Attending: PODIATRIST
Payer: COMMERCIAL

## 2025-06-10 DIAGNOSIS — M76.71 PERONEAL TENDINITIS OF RIGHT LOWER LEG: Primary | ICD-10-CM

## 2025-06-10 DIAGNOSIS — M25.571 PAIN IN JOINT, ANKLE AND FOOT, RIGHT: ICD-10-CM

## 2025-06-10 PROCEDURE — 97530 THERAPEUTIC ACTIVITIES: CPT | Mod: GP | Performed by: PHYSICAL THERAPIST

## 2025-06-10 PROCEDURE — 97112 NEUROMUSCULAR REEDUCATION: CPT | Mod: GP | Performed by: PHYSICAL THERAPIST

## 2025-07-17 PROBLEM — M25.571 PAIN IN JOINT, ANKLE AND FOOT, RIGHT: Status: RESOLVED | Noted: 2025-05-22 | Resolved: 2025-07-17

## 2025-07-17 PROBLEM — M76.71 PERONEAL TENDINITIS OF RIGHT LOWER LEG: Status: RESOLVED | Noted: 2025-05-22 | Resolved: 2025-07-17

## 2025-07-21 DIAGNOSIS — N95.2 POST-MENOPAUSAL ATROPHIC VAGINITIS: ICD-10-CM

## 2025-07-21 RX ORDER — ESTRADIOL 0.5 MG/1
TABLET ORAL
Qty: 24 TABLET | Refills: 0 | Status: SHIPPED | OUTPATIENT
Start: 2025-07-21

## 2025-08-12 ENCOUNTER — MYC MEDICAL ADVICE (OUTPATIENT)
Dept: FAMILY MEDICINE | Facility: CLINIC | Age: 60
End: 2025-08-12
Payer: COMMERCIAL

## (undated) RX ORDER — PROPOFOL 10 MG/ML
INJECTION, EMULSION INTRAVENOUS
Status: DISPENSED
Start: 2025-01-23

## (undated) RX ORDER — IVABRADINE 5 MG/1
TABLET, FILM COATED ORAL
Status: DISPENSED
Start: 2022-12-20

## (undated) RX ORDER — LIDOCAINE HYDROCHLORIDE 10 MG/ML
INJECTION, SOLUTION EPIDURAL; INFILTRATION; INTRACAUDAL; PERINEURAL
Status: DISPENSED
Start: 2025-01-23

## (undated) RX ORDER — METOPROLOL TARTRATE 50 MG
TABLET ORAL
Status: DISPENSED
Start: 2022-12-20

## (undated) RX ORDER — PROPOFOL 10 MG/ML
INJECTION, EMULSION INTRAVENOUS
Status: DISPENSED
Start: 2021-11-03

## (undated) RX ORDER — REGADENOSON 0.08 MG/ML
INJECTION, SOLUTION INTRAVENOUS
Status: DISPENSED
Start: 2022-11-23

## (undated) RX ORDER — LIDOCAINE HYDROCHLORIDE 10 MG/ML
INJECTION, SOLUTION EPIDURAL; INFILTRATION; INTRACAUDAL; PERINEURAL
Status: DISPENSED
Start: 2021-11-03